# Patient Record
Sex: FEMALE | Race: WHITE | NOT HISPANIC OR LATINO | Employment: FULL TIME | URBAN - METROPOLITAN AREA
[De-identification: names, ages, dates, MRNs, and addresses within clinical notes are randomized per-mention and may not be internally consistent; named-entity substitution may affect disease eponyms.]

---

## 2017-01-12 ENCOUNTER — OFFICE VISIT (OUTPATIENT)
Dept: FAMILY MEDICINE | Facility: CLINIC | Age: 52
End: 2017-01-12
Payer: COMMERCIAL

## 2017-01-12 VITALS
HEART RATE: 76 BPM | HEIGHT: 67 IN | WEIGHT: 130.06 LBS | BODY MASS INDEX: 20.41 KG/M2 | DIASTOLIC BLOOD PRESSURE: 79 MMHG | TEMPERATURE: 98 F | SYSTOLIC BLOOD PRESSURE: 130 MMHG

## 2017-01-12 DIAGNOSIS — R51.9 CHRONIC NONINTRACTABLE HEADACHE, UNSPECIFIED HEADACHE TYPE: ICD-10-CM

## 2017-01-12 DIAGNOSIS — G89.29 CHRONIC NONINTRACTABLE HEADACHE, UNSPECIFIED HEADACHE TYPE: ICD-10-CM

## 2017-01-12 DIAGNOSIS — M48.9 CERVICAL SPINE DISEASE: Primary | ICD-10-CM

## 2017-01-12 PROCEDURE — 99999 PR PBB SHADOW E&M-EST. PATIENT-LVL III: CPT | Mod: PBBFAC,,, | Performed by: FAMILY MEDICINE

## 2017-01-12 PROCEDURE — 99213 OFFICE O/P EST LOW 20 MIN: CPT | Mod: S$GLB,,, | Performed by: FAMILY MEDICINE

## 2017-01-12 PROCEDURE — 1159F MED LIST DOCD IN RCRD: CPT | Mod: S$GLB,,, | Performed by: FAMILY MEDICINE

## 2017-01-12 RX ORDER — HYDROCODONE BITARTRATE AND ACETAMINOPHEN 10; 325 MG/1; MG/1
1 TABLET ORAL 4 TIMES DAILY
Qty: 120 TABLET | Refills: 0 | Status: SHIPPED | OUTPATIENT
Start: 2017-01-12 | End: 2017-04-11 | Stop reason: SDUPTHER

## 2017-01-12 RX ORDER — HYDROCODONE BITARTRATE AND ACETAMINOPHEN 10; 325 MG/1; MG/1
1 TABLET ORAL 4 TIMES DAILY
Qty: 120 TABLET | Refills: 0 | Status: CANCELLED | OUTPATIENT
Start: 2017-02-11

## 2017-01-12 RX ORDER — HYDROCODONE BITARTRATE AND ACETAMINOPHEN 10; 325 MG/1; MG/1
1 TABLET ORAL 4 TIMES DAILY
Qty: 120 TABLET | Refills: 0 | Status: CANCELLED | OUTPATIENT
Start: 2017-03-13

## 2017-01-12 RX ORDER — CELECOXIB 200 MG/1
200 CAPSULE ORAL 2 TIMES DAILY
Qty: 180 CAPSULE | Refills: 4 | Status: SHIPPED | OUTPATIENT
Start: 2017-01-12 | End: 2017-04-11

## 2017-01-12 NOTE — PROGRESS NOTES
The patient presents today to fu arthralgia more stable resp to HC and celebrex   Past Medical History:  Past Medical History   Diagnosis Date    Bulging disc      Past Surgical History   Procedure Laterality Date    Mandible fracture surgery       Review of patient's allergies indicates:   Allergen Reactions    No known drug allergies      Current Outpatient Prescriptions on File Prior to Visit   Medication Sig Dispense Refill    celecoxib (CELEBREX) 200 MG capsule Take 1 capsule (200 mg total) by mouth 2 (two) times daily. 180 capsule 4    COMBIPATCH 0.05-0.14 mg/24 hr APPLY AS DIRECTED 24 patch 3    hydrocodone-acetaminophen 10-325mg (NORCO)  mg Tab Take 1 tablet by mouth 4 (four) times daily. 120 tablet 0    omeprazole (PRILOSEC) 40 MG capsule TK 1 C PO QD  1     No current facility-administered medications on file prior to visit.      Social History     Social History    Marital status: Single     Spouse name: N/A    Number of children: N/A    Years of education: N/A     Occupational History     [employer]     Social History Main Topics    Smoking status: Never Smoker    Smokeless tobacco: Not on file    Alcohol use No    Drug use: No    Sexual activity: Yes     Other Topics Concern    Not on file     Social History Narrative     Family History   Problem Relation Age of Onset    Hypertension Mother     Diabetes Mother     Cancer Father     Colon cancer Neg Hx     Ovarian cancer Neg Hx     Breast cancer Paternal Aunt          ROS:GENERAL: No fever, chills, fatigability or weight loss.  SKIN: No rashes, itching or changes in color or texture of skin.  HEAD: No headaches or recent head trauma.EYES: Visual acuity fine. No photophobia, ocular pain or diplopia.EARS: Denies ear pain, discharge or vertigo.NOSE: No loss of smell, no epistaxis or postnasal drip.MOUTH & THROAT: No hoarseness or change in voice. No excessive gum bleeding.NODES: Denies swollen glands.  CHEST: Denies  ARROYO, cyanosis, wheezing, cough and sputum production.  CARDIOVASCULAR: Denies chest pain, PND, orthopnea or reduced exercise tolerance.  ABDOMEN: Appetite fine. No weight loss. Denies diarrhea, abdominal pain, hematemesis or blood in stool.  URINARY: No flank pain, dysuria or hematuria.  PERIPHERAL VASCULAR: No claudication or cyanosis.  MUSCULOSKELETAL: See above.  NEUROLOGIC: No history of seizures, paralysis, alteration of gait or coordination.  PE:   HEAD: Normocephalic, atraumatic.EYES: PERRL. EOMI.   EARS: TM's intact. Light reflex normal. No retraction or perforation.   NOSE: Mucosa pink. Airway clear.MOUTH & THROAT: No tonsillar enlargement. No pharyngeal erythema or exudate. No stridor.  NODES: No cervical, axillary or inguinal lymph node enlargement.  CHEST: Lungs clear to auscultation.  CARDIOVASCULAR: Normal S1, S2. No rubs, murmurs or gallops.  ABDOMEN: Bowel sounds normal. Not distended. Soft. No tenderness or masses.  MUSCULOSKELETAL: Mod gen degen changes NEUROLOGIC: Cranial Nerves: II-XII grossly intact.  Motor: 5/5 strength major flexors/extensors.  DTR's: Knees, Ankles 2+ and equal bilaterally; downgoing toes.  Sensory: Intact to light touch distally.  Gait & Posture: Normal gait and fine motion. No cerebellar signs.     Impression: Cervical and lumbar DDD  GERD  Plan:Rev normal lab eval to be done at [facility]   Rec diet and ex recs  Rev ok RF [id number] bid /HC for sparing use and   Continue H2 and or gaviscon-rev PPI risk low Mg renal fx

## 2017-01-30 ENCOUNTER — PATIENT MESSAGE (OUTPATIENT)
Dept: OBSTETRICS AND GYNECOLOGY | Facility: CLINIC | Age: 52
End: 2017-01-30

## 2017-03-28 ENCOUNTER — PATIENT OUTREACH (OUTPATIENT)
Dept: ADMINISTRATIVE | Facility: HOSPITAL | Age: 52
End: 2017-03-28

## 2017-04-11 ENCOUNTER — OFFICE VISIT (OUTPATIENT)
Dept: FAMILY MEDICINE | Facility: CLINIC | Age: 52
End: 2017-04-11
Payer: COMMERCIAL

## 2017-04-11 VITALS
HEART RATE: 64 BPM | HEIGHT: 67 IN | BODY MASS INDEX: 20.5 KG/M2 | SYSTOLIC BLOOD PRESSURE: 136 MMHG | WEIGHT: 130.63 LBS | DIASTOLIC BLOOD PRESSURE: 89 MMHG

## 2017-04-11 DIAGNOSIS — M48.9 CERVICAL SPINE DISEASE: Primary | ICD-10-CM

## 2017-04-11 DIAGNOSIS — G89.29 CHRONIC NONINTRACTABLE HEADACHE, UNSPECIFIED HEADACHE TYPE: ICD-10-CM

## 2017-04-11 DIAGNOSIS — R51.9 CHRONIC NONINTRACTABLE HEADACHE, UNSPECIFIED HEADACHE TYPE: ICD-10-CM

## 2017-04-11 PROCEDURE — 99999 PR PBB SHADOW E&M-EST. PATIENT-LVL II: CPT | Mod: PBBFAC,,, | Performed by: FAMILY MEDICINE

## 2017-04-11 PROCEDURE — 99213 OFFICE O/P EST LOW 20 MIN: CPT | Mod: S$GLB,,, | Performed by: FAMILY MEDICINE

## 2017-04-11 PROCEDURE — 1160F RVW MEDS BY RX/DR IN RCRD: CPT | Mod: S$GLB,,, | Performed by: FAMILY MEDICINE

## 2017-04-11 RX ORDER — HYDROCODONE BITARTRATE AND ACETAMINOPHEN 10; 325 MG/1; MG/1
1 TABLET ORAL 4 TIMES DAILY
Qty: 120 TABLET | Refills: 0 | Status: SHIPPED | OUTPATIENT
Start: 2017-04-11 | End: 2017-07-05 | Stop reason: SDUPTHER

## 2017-04-11 RX ORDER — DICLOFENAC POTASSIUM 50 MG/1
50 TABLET, FILM COATED ORAL 2 TIMES DAILY
Qty: 180 TABLET | Refills: 4 | Status: SHIPPED | OUTPATIENT
Start: 2017-04-11 | End: 2018-04-11 | Stop reason: SDUPTHER

## 2017-04-11 NOTE — PROGRESS NOTES
The patient presents today to fu arthralgia more stable resp to HC and celebrex   Past Medical History:  Past Medical History:   Diagnosis Date    Bulging disc      Past Surgical History:   Procedure Laterality Date    MANDIBLE FRACTURE SURGERY       Review of patient's allergies indicates:   Allergen Reactions    No known drug allergies      Current Outpatient Prescriptions on File Prior to Visit   Medication Sig Dispense Refill    celecoxib (CELEBREX) 200 MG capsule Take 1 capsule (200 mg total) by mouth 2 (two) times daily. 180 capsule 4    COMBIPATCH 0.05-0.14 mg/24 hr APPLY AS DIRECTED 24 patch 3    hydrocodone-acetaminophen 10-325mg (NORCO)  mg Tab Take 1 tablet by mouth 4 (four) times daily. 120 tablet 0    omeprazole (PRILOSEC) 40 MG capsule TK 1 C PO QD  1     No current facility-administered medications on file prior to visit.      Social History     Social History    Marital status: Single     Spouse name: N/A    Number of children: N/A    Years of education: N/A     Occupational History     [employer]     Social History Main Topics    Smoking status: Never Smoker    Smokeless tobacco: Not on file    Alcohol use No    Drug use: No    Sexual activity: Yes     Other Topics Concern    Not on file     Social History Narrative    No narrative on file     Family History   Problem Relation Age of Onset    Hypertension Mother     Diabetes Mother     Cancer Father     Breast cancer Paternal Aunt     Colon cancer Neg Hx     Ovarian cancer Neg Hx          ROS:GENERAL: No fever, chills, fatigability or weight loss.  SKIN: No rashes, itching or changes in color or texture of skin.  HEAD: No headaches or recent head trauma.EYES: Visual acuity fine. No photophobia, ocular pain or diplopia.EARS: Denies ear pain, discharge or vertigo.NOSE: No loss of smell, no epistaxis or postnasal drip.MOUTH & THROAT: No hoarseness or change in voice. No excessive gum bleeding.NODES: Denies swollen  glands.  CHEST: Denies ARROYO, cyanosis, wheezing, cough and sputum production.  CARDIOVASCULAR: Denies chest pain, PND, orthopnea or reduced exercise tolerance.  ABDOMEN: Appetite fine. No weight loss. Denies diarrhea, abdominal pain, hematemesis or blood in stool.  URINARY: No flank pain, dysuria or hematuria.  PERIPHERAL VASCULAR: No claudication or cyanosis.  MUSCULOSKELETAL: See above.  NEUROLOGIC: No history of seizures, paralysis, alteration of gait or coordination.  PE:   HEAD: Normocephalic, atraumatic.EYES: PERRL. EOMI.   EARS: TM's intact. Light reflex normal. No retraction or perforation.   NOSE: Mucosa pink. Airway clear.MOUTH & THROAT: No tonsillar enlargement. No pharyngeal erythema or exudate. No stridor.  NODES: No cervical, axillary or inguinal lymph node enlargement.  CHEST: Lungs clear to auscultation.  CARDIOVASCULAR: Normal S1, S2. No rubs, murmurs or gallops.  ABDOMEN: Bowel sounds normal. Not distended. Soft. No tenderness or masses.  MUSCULOSKELETAL: Mod gen degen changes NEUROLOGIC: Cranial Nerves: II-XII grossly intact.  Motor: 5/5 strength major flexors/extensors.  DTR's: Knees, Ankles 2+ and equal bilaterally; downgoing toes.  Sensory: Intact to light touch distally.  Gait & Posture: Normal gait and fine motion. No cerebellar signs.     Impression: Cervical and lumbar DDD  GERD  [facility]   [facility] diet and ex recs  Rev ok Ch [id number] to diclo K  bid /HC for sparing use and   Continue H2 and or gaviscon-rev PPI risk low Mg renal fx

## 2017-06-21 ENCOUNTER — PATIENT OUTREACH (OUTPATIENT)
Dept: ADMINISTRATIVE | Facility: HOSPITAL | Age: 52
End: 2017-06-21

## 2017-06-21 NOTE — PROGRESS NOTES
Spoke with pt concerning her overdue health maintenance.  Pt stated she was going to schedule an appt with DIOGENES Sawyer and have her Mammogram done next month.  She also understand that she need her Hep C Screening done and she will schedule when she has her upcoming blood work that is due soon.  Pre visit chart audit letter sent.

## 2017-06-29 ENCOUNTER — PATIENT OUTREACH (OUTPATIENT)
Dept: ADMINISTRATIVE | Facility: HOSPITAL | Age: 52
End: 2017-06-29

## 2017-06-29 NOTE — LETTER
June 29, 2017    Wendie Leandro  [address]  [place] [address]           [facility]  [address]  [facility] [address]  Phone: [phone] Dear Mrs. Reeves:    [facility] is committed to your overall health.  To help you get the most out of each of your visits, we will review your information to make sure you are up to date on all of your recommended tests and/or procedures.      Dhaval Tyler MD has found that you may be due for   Health Maintenance Due   Topic    Hepatitis C Screening         If you have had any of the above done at another facility, please bring the records or information with you so that your record at [facility] will be complete.    If you are currently taking medication, please bring it with you to your appointment for review.    We will be happy to assist you with scheduling any necessary appointments or you may contact the [facility] appointment desk at [phone] to schedule at your convenience.     Thank you for choosing [facility] for your healthcare needs,            If you have any questions or concerns, please don't hesitate to call.    Sincerely,        Smitha Angel LPN

## 2017-07-03 ENCOUNTER — TELEPHONE (OUTPATIENT)
Dept: OBSTETRICS AND GYNECOLOGY | Facility: CLINIC | Age: 52
End: 2017-07-03

## 2017-07-03 NOTE — TELEPHONE ENCOUNTER
----- Message from Francisco Thomas sent at 7/3/2017  2:29 PM CDT -----  Contact: Pt  x_ 1st Request  _ 2nd Request  _ 3rd Request    Who: Pt    Why: Pt is needing to schedule a appointment.     What Number to Call Back: [phone]    When to Expect a call back: (Before the end of the day)  -- if call after 3:00 call back will be tomorrow.

## 2017-07-05 ENCOUNTER — OFFICE VISIT (OUTPATIENT)
Dept: FAMILY MEDICINE | Facility: CLINIC | Age: 52
End: 2017-07-05
Payer: COMMERCIAL

## 2017-07-05 VITALS
DIASTOLIC BLOOD PRESSURE: 89 MMHG | SYSTOLIC BLOOD PRESSURE: 139 MMHG | WEIGHT: 129.19 LBS | BODY MASS INDEX: 20.28 KG/M2 | HEIGHT: 67 IN | HEART RATE: 64 BPM

## 2017-07-05 DIAGNOSIS — M48.9 CERVICAL SPINE DISEASE: Primary | ICD-10-CM

## 2017-07-05 PROCEDURE — 99999 PR PBB SHADOW E&M-EST. PATIENT-LVL II: CPT | Mod: PBBFAC,,, | Performed by: FAMILY MEDICINE

## 2017-07-05 PROCEDURE — 99213 OFFICE O/P EST LOW 20 MIN: CPT | Mod: S$GLB,,, | Performed by: FAMILY MEDICINE

## 2017-07-05 RX ORDER — HYDROCODONE BITARTRATE AND ACETAMINOPHEN 10; 325 MG/1; MG/1
1 TABLET ORAL 4 TIMES DAILY
Qty: 120 TABLET | Refills: 0 | Status: SHIPPED | OUTPATIENT
Start: 2017-07-05 | End: 2017-10-11 | Stop reason: SDUPTHER

## 2017-07-05 RX ORDER — GABAPENTIN 300 MG/1
300 CAPSULE ORAL NIGHTLY
Qty: 30 CAPSULE | Refills: 11 | Status: SHIPPED | OUTPATIENT
Start: 2017-07-05 | End: 2017-10-11

## 2017-07-05 NOTE — PROGRESS NOTES
The patient presents today to fu arthralgia more stable resp to HC and celebrex   Past Medical History:  Past Medical History:   Diagnosis Date    Bulging disc      Past Surgical History:   Procedure Laterality Date    MANDIBLE FRACTURE SURGERY       Review of patient's allergies indicates:   Allergen Reactions    No known drug allergies      Current Outpatient Prescriptions on File Prior to Visit   Medication Sig Dispense Refill    COMBIPATCH 0.05-0.14 mg/24 hr APPLY AS DIRECTED 24 patch 3    diclofenac (CATAFLAM) 50 MG tablet Take 1 tablet (50 mg total) by mouth 2 (two) times daily. 180 tablet 4    hydrocodone-acetaminophen 10-325mg (NORCO)  mg Tab Take 1 tablet by mouth 4 (four) times daily. 120 tablet 0    omeprazole (PRILOSEC) 40 MG capsule TK 1 C PO QD  1     No current facility-administered medications on file prior to visit.      Social History     Social History    Marital status: Single     Spouse name: N/A    Number of children: N/A    Years of education: N/A     Occupational History     [employer]     Social History Main Topics    Smoking status: Never Smoker    Smokeless tobacco: Not on file    Alcohol use No    Drug use: No    Sexual activity: Yes     Other Topics Concern    Not on file     Social History Narrative    No narrative on file     Family History   Problem Relation Age of Onset    Hypertension Mother     Diabetes Mother     Cancer Father     Breast cancer Paternal Aunt     Colon cancer Neg Hx     Ovarian cancer Neg Hx          ROS:GENERAL: No fever, chills, fatigability or weight loss.  SKIN: No rashes, itching or changes in color or texture of skin.  HEAD: No headaches or recent head trauma.EYES: Visual acuity fine. No photophobia, ocular pain or diplopia.EARS: Denies ear pain, discharge or vertigo.NOSE: No loss of smell, no epistaxis or postnasal drip.MOUTH & THROAT: No hoarseness or change in voice. No excessive gum bleeding.NODES: Denies swollen  glands.  CHEST: Denies ARROYO, cyanosis, wheezing, cough and sputum production.  CARDIOVASCULAR: Denies chest pain, PND, orthopnea or reduced exercise tolerance.  ABDOMEN: Appetite fine. No weight loss. Denies diarrhea, abdominal pain, hematemesis or blood in stool.  URINARY: No flank pain, dysuria or hematuria.  PERIPHERAL VASCULAR: No claudication or cyanosis.  MUSCULOSKELETAL: See above.  NEUROLOGIC: No history of seizures, paralysis, alteration of gait or coordination.  PE:   HEAD: Normocephalic, atraumatic.EYES: PERRL. EOMI.   EARS: TM's intact. Light reflex normal. No retraction or perforation.   NOSE: Mucosa pink. Airway clear.MOUTH & THROAT: No tonsillar enlargement. No pharyngeal erythema or exudate. No stridor.  NODES: No cervical, axillary or inguinal lymph node enlargement.  CHEST: Lungs clear to auscultation.  CARDIOVASCULAR: Normal S1, S2. No rubs, murmurs or gallops.  ABDOMEN: Bowel sounds normal. Not distended. Soft. No tenderness or masses.  MUSCULOSKELETAL: Mod gen degen changes NEUROLOGIC: Cranial Nerves: II-XII grossly intact.  Motor: 5/5 strength major flexors/extensors.  DTR's: Knees, Ankles 2+ and equal bilaterally; downgoing toes.  Sensory: Intact to light touch distally.  Gait & Posture: Normal gait and fine motion. No cerebellar signs.     Impression: Cervical and lumbar DDD  GERD  Plan:[facility]   Rec diet and ex recs  Rev ok HC for sparing use and   Continue H2 and or gaviscon-rev PPI risk low Mg renal fx

## 2017-07-18 ENCOUNTER — PATIENT MESSAGE (OUTPATIENT)
Dept: FAMILY MEDICINE | Facility: CLINIC | Age: 52
End: 2017-07-18

## 2017-07-20 NOTE — TELEPHONE ENCOUNTER
We are trying to get all people off of the ambien due to addictive nature and issues with this.     I would recommend changing to something else in another class like amitriptyline.  We cannot use the 10 mg dose of ambien any more and obviously Dr. Tyler did not want us to use this class anyway.  Let the patient know I would like to try amitryptiline 10-20 mg a night and will send that in.  Let me know.

## 2017-07-21 NOTE — TELEPHONE ENCOUNTER
Patient would like to wait for a response from Dr. Tyler. Patient is aware Dr. Tyler is out of town until 7/26

## 2017-07-27 RX ORDER — ZOLPIDEM TARTRATE 10 MG/1
10 TABLET ORAL NIGHTLY PRN
Qty: 30 TABLET | Refills: 0 | Status: SHIPPED | OUTPATIENT
Start: 2017-07-27 | End: 2017-07-27 | Stop reason: SDUPTHER

## 2017-07-27 RX ORDER — ZOLPIDEM TARTRATE 10 MG/1
10 TABLET ORAL NIGHTLY PRN
Qty: 30 TABLET | Refills: 0 | Status: SHIPPED | OUTPATIENT
Start: 2017-07-27 | End: 2018-04-11 | Stop reason: SDUPTHER

## 2017-08-09 ENCOUNTER — OFFICE VISIT (OUTPATIENT)
Dept: OBSTETRICS AND GYNECOLOGY | Facility: CLINIC | Age: 52
End: 2017-08-09
Attending: OBSTETRICS & GYNECOLOGY
Payer: COMMERCIAL

## 2017-08-09 VITALS — WEIGHT: 131.19 LBS | HEIGHT: 67 IN | BODY MASS INDEX: 20.59 KG/M2

## 2017-08-09 DIAGNOSIS — Z78.0 MENOPAUSE: ICD-10-CM

## 2017-08-09 DIAGNOSIS — Z12.31 VISIT FOR SCREENING MAMMOGRAM: Primary | ICD-10-CM

## 2017-08-09 PROCEDURE — 3008F BODY MASS INDEX DOCD: CPT | Mod: S$GLB,,, | Performed by: OBSTETRICS & GYNECOLOGY

## 2017-08-09 PROCEDURE — 99999 PR PBB SHADOW E&M-EST. PATIENT-LVL II: CPT | Mod: PBBFAC,,, | Performed by: OBSTETRICS & GYNECOLOGY

## 2017-08-09 PROCEDURE — 96372 THER/PROPH/DIAG INJ SC/IM: CPT | Mod: S$GLB,,, | Performed by: OBSTETRICS & GYNECOLOGY

## 2017-08-09 PROCEDURE — 99214 OFFICE O/P EST MOD 30 MIN: CPT | Mod: 25,S$GLB,, | Performed by: OBSTETRICS & GYNECOLOGY

## 2017-08-09 RX ORDER — ESTRADIOL 0.07 MG/D
1 FILM, EXTENDED RELEASE TRANSDERMAL
Qty: 4 PATCH | Refills: 11 | Status: SHIPPED | OUTPATIENT
Start: 2017-08-09 | End: 2018-01-10

## 2017-08-09 RX ORDER — PROGESTERONE 100 MG/1
100 CAPSULE ORAL NIGHTLY
Qty: 30 CAPSULE | Refills: 11 | Status: SHIPPED | OUTPATIENT
Start: 2017-08-09 | End: 2017-10-11

## 2017-08-09 RX ORDER — TESTOSTERONE CYPIONATE 200 MG/ML
50 INJECTION, SOLUTION INTRAMUSCULAR ONCE
Status: COMPLETED | OUTPATIENT
Start: 2017-08-09 | End: 2017-08-09

## 2017-08-09 RX ADMIN — TESTOSTERONE CYPIONATE 50 MG: 200 INJECTION, SOLUTION INTRAMUSCULAR at 09:08

## 2017-08-09 NOTE — PROGRESS NOTES
Subjective:       Patient ID: Wendie Reeves is a 51 y.o. female.    Chief Complaint:  Follow-up (for hormone levels)      History of Present Illness  HPI  This 51 yr old female has been doing well on combipatch but had lab done and estradiol only 50.  She also complains of no libido, weight issues and just wants to feel better.  Her internist did full workup and testosterone at lower end of normal as well.  We had a very long discussion on risks and benefits and spent over 25min.    GYN & OB History  Patient's last menstrual period was 2014.   Date of Last Pap: 2015    OB History    Para Term  AB Living   0             SAB TAB Ectopic Multiple Live Births                         Review of Systems  Review of Systems   Constitutional: Positive for fatigue. Negative for chills and fever.   Respiratory: Negative for shortness of breath.    Cardiovascular: Negative for chest pain.   Gastrointestinal: Negative for abdominal pain, nausea and vomiting.   Genitourinary: Negative for difficulty urinating, dyspareunia, genital sores, menstrual problem, pelvic pain, vaginal bleeding, vaginal discharge and vaginal pain.   Skin: Negative for wound.   Hematological: Negative for adenopathy.           Objective:    Physical Exam       Assessment:        1. Visit for screening mammogram    2. Menopause               Plan:      Will start Im testosterone 50 mg today and change to 0.075 vivelle and prometrium 100  Follow up in one month for discussion and changes if necessary

## 2017-08-17 ENCOUNTER — TELEPHONE (OUTPATIENT)
Dept: FAMILY MEDICINE | Facility: CLINIC | Age: 52
End: 2017-08-17

## 2017-08-17 NOTE — TELEPHONE ENCOUNTER
----- Message from May Millard sent at 8/17/2017 10:41 AM CDT -----  Contact: [facility]  [facility] returned call to Liliana. She can be contacted at [phone].    Thanks,  May

## 2017-09-26 RX ORDER — ESTRADIOL/NORETHINDRONE ACETATE TRANSDERMAL SYSTEM .05; .14 MG/D; MG/D
PATCH, EXTENDED RELEASE TRANSDERMAL
Qty: 24 PATCH | Refills: 0 | Status: SHIPPED | OUTPATIENT
Start: 2017-09-26 | End: 2017-12-22 | Stop reason: SDUPTHER

## 2017-09-27 ENCOUNTER — PATIENT OUTREACH (OUTPATIENT)
Dept: ADMINISTRATIVE | Facility: HOSPITAL | Age: 52
End: 2017-09-27

## 2017-10-11 ENCOUNTER — OFFICE VISIT (OUTPATIENT)
Dept: FAMILY MEDICINE | Facility: CLINIC | Age: 52
End: 2017-10-11
Payer: COMMERCIAL

## 2017-10-11 VITALS
SYSTOLIC BLOOD PRESSURE: 137 MMHG | HEART RATE: 59 BPM | WEIGHT: 130.63 LBS | HEIGHT: 67 IN | DIASTOLIC BLOOD PRESSURE: 94 MMHG | BODY MASS INDEX: 20.5 KG/M2

## 2017-10-11 DIAGNOSIS — G89.29 CHRONIC NONINTRACTABLE HEADACHE, UNSPECIFIED HEADACHE TYPE: ICD-10-CM

## 2017-10-11 DIAGNOSIS — R51.9 CHRONIC NONINTRACTABLE HEADACHE, UNSPECIFIED HEADACHE TYPE: ICD-10-CM

## 2017-10-11 DIAGNOSIS — M48.9 CERVICAL SPINE DISEASE: Primary | ICD-10-CM

## 2017-10-11 PROCEDURE — 99999 PR PBB SHADOW E&M-EST. PATIENT-LVL II: CPT | Mod: PBBFAC,,, | Performed by: FAMILY MEDICINE

## 2017-10-11 PROCEDURE — 99214 OFFICE O/P EST MOD 30 MIN: CPT | Mod: S$GLB,,, | Performed by: FAMILY MEDICINE

## 2017-10-11 RX ORDER — HYDROCODONE BITARTRATE AND ACETAMINOPHEN 10; 325 MG/1; MG/1
1 TABLET ORAL 4 TIMES DAILY
Qty: 120 TABLET | Refills: 0 | Status: SHIPPED | OUTPATIENT
Start: 2017-10-11 | End: 2018-01-10 | Stop reason: SDUPTHER

## 2017-10-11 RX ORDER — NORTRIPTYLINE HYDROCHLORIDE 10 MG/1
10 CAPSULE ORAL NIGHTLY
Qty: 30 CAPSULE | Refills: 11 | Status: SHIPPED | OUTPATIENT
Start: 2017-10-11 | End: 2018-01-10

## 2017-10-11 NOTE — PROGRESS NOTES
The patient presents today to fu arthralgia more stable resp to HC and celebrex. HA are more bothersome and often    Past Medical History:  Past Medical History:   Diagnosis Date    Bulging disc      Past Surgical History:   Procedure Laterality Date    MANDIBLE FRACTURE SURGERY       Review of patient's allergies indicates:   Allergen Reactions    No known drug allergies      Current Outpatient Prescriptions on File Prior to Visit   Medication Sig Dispense Refill    COMBIPATCH 0.05-0.14 mg/24 hr APPLY AS DIRECTED 24 patch 0    diclofenac (CATAFLAM) 50 MG tablet Take 1 tablet (50 mg total) by mouth 2 (two) times daily. 180 tablet 4    estradiol (VIVELLE) 0.075 mg/24 hr Place 1 patch onto the skin every 7 days. 4 patch 11    hydrocodone-acetaminophen 10-325mg (NORCO)  mg Tab Take 1 tablet by mouth 4 (four) times daily. 120 tablet 0    omeprazole (PRILOSEC) 40 MG capsule TK 1 C PO QD  1    zolpidem (AMBIEN) 10 mg Tab Take 1 tablet (10 mg total) by mouth nightly as needed. 30 tablet 0    [DISCONTINUED] gabapentin (NEURONTIN) 300 MG capsule Take 1 capsule (300 mg total) by mouth every evening. 30 capsule 11    [DISCONTINUED] progesterone (PROMETRIUM) 100 MG capsule Take 1 capsule (100 mg total) by mouth nightly. 30 capsule 11     No current facility-administered medications on file prior to visit.      Social History     Social History    Marital status: Single     Spouse name: N/A    Number of children: N/A    Years of education: N/A     Occupational History     [employer]     Social History Main Topics    Smoking status: Never Smoker    Smokeless tobacco: Never Used    Alcohol use No    Drug use: No    Sexual activity: Yes     Birth control/ protection: Post-menopausal     Other Topics Concern    Not on file     Social History Narrative    No narrative on file     Family History   Problem Relation Age of Onset    Hypertension Mother     Diabetes Mother     Cancer Father      Breast cancer Paternal Aunt     Colon cancer Neg Hx     Ovarian cancer Neg Hx          ROS:GENERAL: No fever, chills, fatigability or weight loss.  SKIN: No rashes, itching or changes in color or texture of skin.  HEAD: No headaches or recent head trauma.EYES: Visual acuity fine. No photophobia, ocular pain or diplopia.EARS: Denies ear pain, discharge or vertigo.NOSE: No loss of smell, no epistaxis or postnasal drip.MOUTH & THROAT: No hoarseness or change in voice. No excessive gum bleeding.NODES: Denies swollen glands.  CHEST: Denies ARROYO, cyanosis, wheezing, cough and sputum production.  CARDIOVASCULAR: Denies chest pain, PND, orthopnea or reduced exercise tolerance.  ABDOMEN: Appetite fine. No weight loss. Denies diarrhea, abdominal pain, hematemesis or blood in stool.  URINARY: No flank pain, dysuria or hematuria.  PERIPHERAL VASCULAR: No claudication or cyanosis.  MUSCULOSKELETAL: See above.  NEUROLOGIC: No history of seizures, paralysis, alteration of gait or coordination.  PE:   HEAD: Normocephalic, atraumatic.EYES: PERRL. EOMI.   EARS: TM's intact. Light reflex normal. No retraction or perforation.   NOSE: Mucosa pink. Airway clear.MOUTH & THROAT: No tonsillar enlargement. No pharyngeal erythema or exudate. No stridor.  NODES: No cervical, axillary or inguinal lymph node enlargement.  CHEST: Lungs clear to auscultation.  CARDIOVASCULAR: Normal S1, S2. No rubs, murmurs or gallops.  ABDOMEN: Bowel sounds normal. Not distended. Soft. No tenderness or masses.  MUSCULOSKELETAL: Mod gen degen changes NEUROLOGIC: Cranial Nerves: II-XII grossly intact.  Motor: 5/5 strength major flexors/extensors.  DTR's: Knees, Ankles 2+ and equal bilaterally; downgoing toes.  Sensory: Intact to light touch distally.  Gait & Posture: Normal gait and fine motion. No cerebellar signs.     Impression: Cervical and lumbar DDD  CD GONZALEZ  GERD  Plan:Rec ESR to be done at [facility]   Rec diet and ex recs  Rev ok HC for sparing use and    Continue H2 and or gaviscon-rev PPI risk low Mg renal fx

## 2017-12-27 ENCOUNTER — PATIENT OUTREACH (OUTPATIENT)
Dept: ADMINISTRATIVE | Facility: HOSPITAL | Age: 52
End: 2017-12-27

## 2017-12-27 RX ORDER — ESTRADIOL/NORETHINDRONE ACETATE TRANSDERMAL SYSTEM .05; .14 MG/D; MG/D
PATCH, EXTENDED RELEASE TRANSDERMAL
Qty: 24 PATCH | Refills: 0 | Status: SHIPPED | OUTPATIENT
Start: 2017-12-27 | End: 2018-07-25 | Stop reason: SDUPTHER

## 2017-12-27 NOTE — PROGRESS NOTES
Attempted to contact pt concerning overdue mammogram. No answer. Left message for pt to return my call.

## 2017-12-27 NOTE — PROGRESS NOTES
Pt return my call and stated due to her insurance she will have her mammogram perform at [facility] where she works.  She stated she will bring a copy of mammogram report to update her chart.

## 2018-01-10 ENCOUNTER — OFFICE VISIT (OUTPATIENT)
Dept: FAMILY MEDICINE | Facility: CLINIC | Age: 53
End: 2018-01-10
Payer: COMMERCIAL

## 2018-01-10 ENCOUNTER — HOSPITAL ENCOUNTER (OUTPATIENT)
Dept: RADIOLOGY | Facility: HOSPITAL | Age: 53
Discharge: HOME | End: 2018-01-10
Attending: FAMILY MEDICINE
Payer: COMMERCIAL

## 2018-01-10 VITALS
HEIGHT: 67 IN | BODY MASS INDEX: 20.76 KG/M2 | WEIGHT: 132.25 LBS | DIASTOLIC BLOOD PRESSURE: 82 MMHG | SYSTOLIC BLOOD PRESSURE: 134 MMHG

## 2018-01-10 VITALS — HEIGHT: 67 IN | BODY MASS INDEX: 20.76 KG/M2 | WEIGHT: 132.25 LBS

## 2018-01-10 DIAGNOSIS — Z12.31 SCREENING MAMMOGRAM, ENCOUNTER FOR: ICD-10-CM

## 2018-01-10 DIAGNOSIS — G89.29 CHRONIC NONINTRACTABLE HEADACHE, UNSPECIFIED HEADACHE TYPE: ICD-10-CM

## 2018-01-10 DIAGNOSIS — R51.9 CHRONIC NONINTRACTABLE HEADACHE, UNSPECIFIED HEADACHE TYPE: ICD-10-CM

## 2018-01-10 DIAGNOSIS — M48.9 CERVICAL SPINE DISEASE: Primary | ICD-10-CM

## 2018-01-10 PROCEDURE — 99999 PR PBB SHADOW E&M-EST. PATIENT-LVL II: CPT | Mod: PBBFAC,,, | Performed by: FAMILY MEDICINE

## 2018-01-10 PROCEDURE — 77063 BREAST TOMOSYNTHESIS BI: CPT | Mod: 26,,, | Performed by: RADIOLOGY

## 2018-01-10 PROCEDURE — 77067 SCR MAMMO BI INCL CAD: CPT | Mod: TC,PO

## 2018-01-10 PROCEDURE — 99213 OFFICE O/P EST LOW 20 MIN: CPT | Mod: S$GLB,,, | Performed by: FAMILY MEDICINE

## 2018-01-10 PROCEDURE — 77067 SCR MAMMO BI INCL CAD: CPT | Mod: 26,,, | Performed by: RADIOLOGY

## 2018-01-10 RX ORDER — OMEPRAZOLE 40 MG/1
CAPSULE, DELAYED RELEASE ORAL
Qty: 90 CAPSULE | Refills: 3 | Status: SHIPPED | OUTPATIENT
Start: 2018-01-10 | End: 2018-02-26

## 2018-01-10 RX ORDER — HYDROCODONE BITARTRATE AND ACETAMINOPHEN 10; 325 MG/1; MG/1
1 TABLET ORAL 4 TIMES DAILY
Qty: 120 TABLET | Refills: 0 | Status: SHIPPED | OUTPATIENT
Start: 2018-01-10 | End: 2018-04-11 | Stop reason: SDUPTHER

## 2018-01-10 NOTE — PROGRESS NOTES
The patient presents today to fu arthralgia more stable resp to HC and celebrex. HA are more bothersome and often    Past Medical History:  Past Medical History:   Diagnosis Date    Bulging disc      Past Surgical History:   Procedure Laterality Date    MANDIBLE FRACTURE SURGERY       Review of patient's allergies indicates:   Allergen Reactions    No known drug allergies      Current Outpatient Prescriptions on File Prior to Visit   Medication Sig Dispense Refill    COMBIPATCH 0.05-0.14 mg/24 hr APPLY TWICE A WEEK AS DIRECTED 24 patch 0    diclofenac (CATAFLAM) 50 MG tablet Take 1 tablet (50 mg total) by mouth 2 (two) times daily. 180 tablet 4    hydrocodone-acetaminophen 10-325mg (NORCO)  mg Tab Take 1 tablet by mouth 4 (four) times daily. 120 tablet 0    omeprazole (PRILOSEC) 40 MG capsule TK 1 C PO QD  1    zolpidem (AMBIEN) 10 mg Tab Take 1 tablet (10 mg total) by mouth nightly as needed. 30 tablet 0    [DISCONTINUED] estradiol (VIVELLE) 0.075 mg/24 hr Place 1 patch onto the skin every 7 days. 4 patch 11    [DISCONTINUED] nortriptyline (PAMELOR) 10 MG capsule Take 1 capsule (10 mg total) by mouth every evening. 30 capsule 11     No current facility-administered medications on file prior to visit.      Social History     Social History    Marital status: Single     Spouse name: N/A    Number of children: N/A    Years of education: N/A     Occupational History     [employer]     Social History Main Topics    Smoking status: Never Smoker    Smokeless tobacco: Never Used    Alcohol use No    Drug use: No    Sexual activity: Yes     Birth control/ protection: Post-menopausal     Other Topics Concern    Not on file     Social History Narrative    No narrative on file     Family History   Problem Relation Age of Onset    Hypertension Mother     Diabetes Mother     Cancer Father     Breast cancer Paternal Aunt     Colon cancer Neg Hx     Ovarian cancer Neg Hx           ROS:GENERAL: No fever, chills, fatigability or weight loss.  SKIN: No rashes, itching or changes in color or texture of skin.  HEAD: No headaches or recent head trauma.EYES: Visual acuity fine. No photophobia, ocular pain or diplopia.EARS: Denies ear pain, discharge or vertigo.NOSE: No loss of smell, no epistaxis or postnasal drip.MOUTH & THROAT: No hoarseness or change in voice. No excessive gum bleeding.NODES: Denies swollen glands.  CHEST: Denies ARROYO, cyanosis, wheezing, cough and sputum production.  CARDIOVASCULAR: Denies chest pain, PND, orthopnea or reduced exercise tolerance.  ABDOMEN: Appetite fine. No weight loss. Denies diarrhea, abdominal pain, hematemesis or blood in stool.  URINARY: No flank pain, dysuria or hematuria.  PERIPHERAL VASCULAR: No claudication or cyanosis.  MUSCULOSKELETAL: See above.  NEUROLOGIC: No history of seizures, paralysis, alteration of gait or coordination.  PE:   HEAD: Normocephalic, atraumatic.EYES: PERRL. EOMI.   EARS: TM's intact. Light reflex normal. No retraction or perforation.   NOSE: Mucosa pink. Airway clear.MOUTH & THROAT: No tonsillar enlargement. No pharyngeal erythema or exudate. No stridor.  NODES: No cervical, axillary or inguinal lymph node enlargement.  CHEST: Lungs clear to auscultation.  CARDIOVASCULAR: Normal S1, S2. No rubs, murmurs or gallops.  ABDOMEN: Bowel sounds normal. Not distended. Soft. No tenderness or masses.  MUSCULOSKELETAL: Mod gen degen changes NEUROLOGIC: Cranial Nerves: II-XII grossly intact.  Motor: 5/5 strength major flexors/extensors.  DTR's: Knees, Ankles 2+ and equal bilaterally; downgoing toes.  Sensory: Intact to light touch distally.  Gait & Posture: Normal gait and fine motion. No cerebellar signs.     Impression: Cervical and lumbar DDD  CD GONZALEZ  GERD  Plan:   Rec diet and ex recs  Rev ok HC for sparing use and   Continue H2 and or gaviscon-rev PPI risk low Mg renal fx

## 2018-02-26 ENCOUNTER — OFFICE VISIT (OUTPATIENT)
Dept: FAMILY MEDICINE | Facility: CLINIC | Age: 53
End: 2018-02-26
Payer: COMMERCIAL

## 2018-02-26 ENCOUNTER — HOSPITAL ENCOUNTER (OUTPATIENT)
Dept: RADIOLOGY | Facility: HOSPITAL | Age: 53
Discharge: HOME | End: 2018-02-26
Attending: NURSE PRACTITIONER
Payer: COMMERCIAL

## 2018-02-26 VITALS
HEART RATE: 88 BPM | TEMPERATURE: 98 F | RESPIRATION RATE: 16 BRPM | BODY MASS INDEX: 20.72 KG/M2 | HEIGHT: 67 IN | SYSTOLIC BLOOD PRESSURE: 127 MMHG | OXYGEN SATURATION: 100 % | WEIGHT: 132 LBS | DIASTOLIC BLOOD PRESSURE: 87 MMHG

## 2018-02-26 DIAGNOSIS — J18.9 PNEUMONIA OF RIGHT LOWER LOBE DUE TO INFECTIOUS ORGANISM: Primary | ICD-10-CM

## 2018-02-26 DIAGNOSIS — R11.0 NAUSEA: ICD-10-CM

## 2018-02-26 DIAGNOSIS — R68.89 INFLUENZA-LIKE SYMPTOMS: ICD-10-CM

## 2018-02-26 DIAGNOSIS — R05.9 COUGH: ICD-10-CM

## 2018-02-26 DIAGNOSIS — B37.31 VAGINAL YEAST INFECTION: ICD-10-CM

## 2018-02-26 DIAGNOSIS — Z87.01 HISTORY OF PNEUMONIA: ICD-10-CM

## 2018-02-26 PROCEDURE — 71046 X-RAY EXAM CHEST 2 VIEWS: CPT | Mod: 26,,, | Performed by: RADIOLOGY

## 2018-02-26 PROCEDURE — 99213 OFFICE O/P EST LOW 20 MIN: CPT | Mod: S$GLB,,, | Performed by: NURSE PRACTITIONER

## 2018-02-26 PROCEDURE — 3008F BODY MASS INDEX DOCD: CPT | Mod: S$GLB,,, | Performed by: NURSE PRACTITIONER

## 2018-02-26 PROCEDURE — 71046 X-RAY EXAM CHEST 2 VIEWS: CPT | Mod: TC,PO

## 2018-02-26 PROCEDURE — 99999 PR PBB SHADOW E&M-EST. PATIENT-LVL IV: CPT | Mod: PBBFAC,,, | Performed by: NURSE PRACTITIONER

## 2018-02-26 RX ORDER — GUAIFENESIN 1200 MG/1
1200 TABLET, EXTENDED RELEASE ORAL 2 TIMES DAILY
COMMUNITY
Start: 2018-02-26 | End: 2018-03-08

## 2018-02-26 RX ORDER — FLUCONAZOLE 150 MG/1
150 TABLET ORAL ONCE
Qty: 1 TABLET | Refills: 0 | Status: SHIPPED | OUTPATIENT
Start: 2018-02-26 | End: 2018-02-26

## 2018-02-26 RX ORDER — CEFDINIR 300 MG/1
300 CAPSULE ORAL 2 TIMES DAILY
Qty: 20 CAPSULE | Refills: 0 | Status: SHIPPED | OUTPATIENT
Start: 2018-02-26 | End: 2018-03-08

## 2018-02-26 RX ORDER — PREDNISONE 20 MG/1
20 TABLET ORAL 2 TIMES DAILY
Qty: 6 TABLET | Refills: 0 | Status: SHIPPED | OUTPATIENT
Start: 2018-02-26 | End: 2018-03-02

## 2018-02-26 RX ORDER — HYDROXYZINE PAMOATE 25 MG/1
25 CAPSULE ORAL EVERY 6 HOURS PRN
Qty: 28 CAPSULE | Refills: 0 | Status: SHIPPED | OUTPATIENT
Start: 2018-02-26 | End: 2018-03-27

## 2018-02-26 NOTE — PATIENT INSTRUCTIONS
Tylenol/Motrin OTC as needed for fever/pain  Hydrate well  Rest  Report to ER if symptoms worsen      Pneumonia (Adult)  Pneumonia is an infection deep within the lungs. It is in the small air sacs (alveoli). Pneumonia may be caused by a virus or bacteria. Pneumonia caused by bacteria is usually treated with an antibiotic. Severe cases may need to be treated in the hospital. Milder cases can be treated at home. Symptoms usually start to get better during the first 2 days of treatment.    Home care  Follow these guidelines when caring for yourself at home:  · Rest at home for the first 2 to 3 days, or until you feel stronger. Dont let yourself get overly tired when you go back to your activities.  · Stay away from cigarette smoke - yours or other peoples.  · You may use acetaminophen or ibuprofen to control fever or pain, unless another medicine was prescribed. If you have chronic liver or kidney disease, talk with your healthcare provider before using these medicines. Also talk with your provider if youve had a stomach ulcer or gastrointestinal bleeding. Dont give aspirin to anyone younger than 18 years of age who is ill with a fever. It may cause severe liver damage.  · Your appetite may be poor, so a light diet is fine.  · Drink 6 to 8 glasses of fluids every day to make sure you are getting enough fluids. Beverages can include water, sport drinks, sodas without caffeine, juices, tea, or soup. Fluids will help loosen secretions in the lung. This will make it easier for you to cough up the phlegm (sputum). If you also have heart or kidney disease, check with your healthcare provider before you drink extra fluids.  · Take antibiotic medicine prescribed until it is all gone, even if you are feeling better after a few days.  Follow-up care  Follow up with your healthcare provider in the next 2 to 3 days, or as advised. This is to be sure the medicine is helping you get better.  If you are 65 or older, you should  get a pneumococcal vaccine and a yearly flu (influenza) shot. You should also get these vaccines if you have chronic lung disease like asthma, emphysema, or COPD. Recently, a second type of pneumonia vaccine has become available for everyone over 65 years old. This is in addition to the previous vaccine. Ask your provider about this.  When to seek medical advice  Call your healthcare provider right away if any of these occur:  · You dont get better within the first 48 hours of treatment  · Shortness of breath gets worse  · Rapid breathing (more than 25 breaths per minute)  · Coughing up blood  · Chest pain gets worse with breathing  · Fever of 100.4°F (38°C) or higher that doesnt get better with fever medicine  · Weakness, dizziness, or fainting that gets worse  · Thirst or dry mouth that gets worse  · Sinus pain, headache, or a stiff neck  · Chest pain not caused by coughing  Date Last Reviewed: 1/1/2017  © 7726-0542 [employer]. [address] [place] [address], [place] [address]. All rights reserved. This information is not intended as a substitute for professional medical care. Always follow your healthcare professional's instructions.

## 2018-02-26 NOTE — PROGRESS NOTES
"Subjective:      Patient ID: Wendie Reeves is a 52 y.o. female.    Chief Complaint: No chief complaint on file.    HPI   Patient to clinic with concern that she may have pneumonia.  She reports Thursday she had sudden onset fever, coughing, nausea, vomiting and received care at walk in clinic in [place].  She tested negative for the flu but was treated with Tamiflu and given a Z-pack.  She reports she did not start Z-pack until yesterday.  She reports she has had fever up to 103F for 3 days, no fever today.  She voices associated fatigue, headache, and right sided chest discomfort, especially with cough, deep breath, and when lying on her right side.  Her fever and headache are no longer present.  She voices a history of pneumonia multiple times and fears she may have it again.  Patient also reports she gets yeast infections with multiple antibiotics and requests to have something for this if another antibiotic is needed.    Review of Systems   Constitutional: Positive for activity change, chills, diaphoresis, fatigue and fever.   HENT: Negative for congestion, postnasal drip, rhinorrhea, sinus pressure, sneezing and sore throat.    Respiratory: Positive for cough and chest tightness. Negative for shortness of breath and wheezing.    Cardiovascular: Negative for chest pain, palpitations and leg swelling.   Gastrointestinal: Positive for nausea and vomiting. Negative for abdominal distention, constipation and diarrhea.   Skin: Negative for rash and wound.   Neurological: Positive for light-headedness and headaches. Negative for weakness and numbness.   Psychiatric/Behavioral: The patient is not nervous/anxious.        Objective:     /87   Pulse 88   Temp 98.2 °F (36.8 °C) (Oral)   Resp 16   Ht 5' 7" (1.702 m)   Wt 59.9 kg (132 lb)   LMP 01/05/2014   SpO2 100%   BMI 20.67 kg/m²     Physical Exam   Constitutional: She is oriented to person, place, and time. She appears well-developed and " well-nourished.   HENT:   Head: Normocephalic.   Right Ear: Tympanic membrane normal.   Left Ear: Tympanic membrane normal.   Nose: Rhinorrhea present. No mucosal edema. Right sinus exhibits no maxillary sinus tenderness and no frontal sinus tenderness. Left sinus exhibits no maxillary sinus tenderness and no frontal sinus tenderness.   Mouth/Throat: No oropharyngeal exudate, posterior oropharyngeal edema or posterior oropharyngeal erythema.   Eyes: Pupils are equal, round, and reactive to light.   Cardiovascular: Normal rate, regular rhythm and normal heart sounds.    Pulmonary/Chest: Effort normal. No respiratory distress. She has no decreased breath sounds. She has no wheezes. She has no rhonchi. She has rales in the right lower field.   Abdominal: Soft. Bowel sounds are normal. She exhibits no distension and no mass. There is no tenderness. There is no rebound and no guarding.   Lymphadenopathy:     She has no cervical adenopathy.   Neurological: She is alert and oriented to person, place, and time.   Skin: Skin is warm and dry. No rash noted.   Psychiatric: She has a normal mood and affect. Her behavior is normal. Judgment and thought content normal.   Vitals reviewed.  Details     Reading Physician Reading Date Result Priority   Dixon Gomez, DO 2/26/2018    Narrative     2 view chest    Comparison: 12/11/13    Findings: There is hazy parenchymal opacification seen on the PA film within the right lung base that appears to correspond to some increased opacification overlying the region of the lower thoracic spine on the lateral film and therefore is favored to be within the right lower lobe.  This infiltrate is new when compared to the prior exam.  The previously noted right lower lobe infiltrate has resolved..  The heart is not enlarged.      Impression         1.  Findings highly suspicious for right lower lobe pneumonia.  Followup to resolution is recommended.  Previously noted infiltrate within the  right lower lobe on the study from 2013 has resolved.        Electronically signed by: LUCINDA HARRELL D.O.  Date: 02/26/18  Time: 12:22           Assessment:     1. Pneumonia of right lower lobe due to infectious organism    2. Influenza-like symptoms    3. Cough    4. History of pneumonia    5. Nausea    6. Vaginal yeast infection        Plan:     Problem List Items Addressed This Visit     None      Visit Diagnoses     Pneumonia of right lower lobe due to infectious organism    -  Primary    Relevant Medications    cefdinir (OMNICEF) 300 MG capsule    guaiFENesin 1,200 mg Ta12    predniSONE (DELTASONE) 20 MG tablet    Other Relevant Orders    X-Ray Chest PA And Lateral (Completed)    X-Ray Chest PA And Lateral    Culture, Respiratory with Gram Stain    Influenza-like symptoms        Relevant Orders    X-Ray Chest PA And Lateral (Completed)    Cough        Relevant Medications    guaiFENesin 1,200 mg Ta12    Other Relevant Orders    X-Ray Chest PA And Lateral (Completed)    History of pneumonia        Relevant Orders    X-Ray Chest PA And Lateral (Completed)    Nausea        Relevant Medications    hydrOXYzine pamoate (VISTARIL) 25 MG Cap    Vaginal yeast infection        Relevant Medications    fluconazole (DIFLUCAN) 150 MG Tab      Tylenol/Motrin OTC as needed for fever/pain  Hydrate well  Rest  Symptomatic care discussed and educational handout provided  Report to ER if symptoms worsen    Follow-up in about 6 weeks (around 4/9/2018), or if symptoms worsen or fail to improve, for repeat CXR and f/u on pneumonia.

## 2018-02-26 NOTE — LETTER
February 26, 2018      [facility]  [address] [address]  Phone: [phone]  Fax: [phone]       Patient: Wendie Reeves   YOB: 1965  Date of Visit: 02/26/2018    To Whom It May Concern:    Mandeep Reeves  was at [facility] on 02/26/2018. Please excuse her 2/26/2018 until 03/02/2018.  She may return to work/school on 03/02/2018 with no restrictions. If you have any questions or concerns, or if I can be of further assistance, please do not hesitate to contact me.    Sincerely,        Guilherme Mejia, NP

## 2018-03-02 ENCOUNTER — OFFICE VISIT (OUTPATIENT)
Dept: FAMILY MEDICINE | Facility: CLINIC | Age: 53
End: 2018-03-02
Payer: COMMERCIAL

## 2018-03-02 VITALS
HEART RATE: 71 BPM | HEIGHT: 67 IN | BODY MASS INDEX: 21.06 KG/M2 | OXYGEN SATURATION: 98 % | DIASTOLIC BLOOD PRESSURE: 89 MMHG | SYSTOLIC BLOOD PRESSURE: 153 MMHG | WEIGHT: 134.19 LBS | TEMPERATURE: 98 F

## 2018-03-02 DIAGNOSIS — J18.9 PNEUMONIA OF RIGHT LOWER LOBE DUE TO INFECTIOUS ORGANISM: Primary | ICD-10-CM

## 2018-03-02 PROCEDURE — 99213 OFFICE O/P EST LOW 20 MIN: CPT | Mod: 25,S$GLB,, | Performed by: NURSE PRACTITIONER

## 2018-03-02 PROCEDURE — 99999 PR PBB SHADOW E&M-EST. PATIENT-LVL III: CPT | Mod: PBBFAC,,, | Performed by: NURSE PRACTITIONER

## 2018-03-02 PROCEDURE — 96372 THER/PROPH/DIAG INJ SC/IM: CPT | Mod: S$GLB,,, | Performed by: FAMILY MEDICINE

## 2018-03-02 RX ORDER — PROMETHAZINE HYDROCHLORIDE AND DEXTROMETHORPHAN HYDROBROMIDE 6.25; 15 MG/5ML; MG/5ML
5 SYRUP ORAL 4 TIMES DAILY PRN
Qty: 240 ML | Refills: 0 | Status: SHIPPED | OUTPATIENT
Start: 2018-03-02 | End: 2018-03-05 | Stop reason: ALTCHOICE

## 2018-03-02 RX ORDER — METHYLPREDNISOLONE ACETATE 80 MG/ML
80 INJECTION, SUSPENSION INTRA-ARTICULAR; INTRALESIONAL; INTRAMUSCULAR; SOFT TISSUE
Status: COMPLETED | OUTPATIENT
Start: 2018-03-02 | End: 2018-03-02

## 2018-03-02 RX ORDER — CODEINE PHOSPHATE AND GUAIFENESIN 10; 100 MG/5ML; MG/5ML
10 SOLUTION ORAL EVERY 4 HOURS PRN
Qty: 240 ML | Refills: 0 | Status: CANCELLED | OUTPATIENT
Start: 2018-03-02 | End: 2018-03-12

## 2018-03-02 RX ORDER — CODEINE PHOSPHATE AND GUAIFENESIN 10; 100 MG/5ML; MG/5ML
10 SOLUTION ORAL EVERY 4 HOURS PRN
Qty: 240 ML | Refills: 0 | Status: SHIPPED | OUTPATIENT
Start: 2018-03-02 | End: 2018-03-02 | Stop reason: ALTCHOICE

## 2018-03-02 RX ADMIN — METHYLPREDNISOLONE ACETATE 80 MG: 80 INJECTION, SUSPENSION INTRA-ARTICULAR; INTRALESIONAL; INTRAMUSCULAR; SOFT TISSUE at 03:03

## 2018-03-02 NOTE — PROGRESS NOTES
"Subjective:      Patient ID: Wendie Reeves is a 52 y.o. female.    Chief Complaint: Follow-up; Chest Pain; and Cough (dry)    HPI   Patient to clinic for a follow up after diagnosis of pneumonia with complaint of worsening cough at night and now with muscle aching due to coughing.  She reports she is no longer having fever as before, highest temp readings up to 99.5F at night.  She reports it is still painful to lay on right side so she sleeps sitting up in chair.  She reports occasional shortness of breath with exertion, but denies wheezing.  She does occasionally take Norco for chronic pain but voices she has not taken this in the last several days.    Review of Systems   Constitutional: Negative for chills, fatigue and fever.   HENT: Negative.    Eyes: Negative.    Respiratory: Positive for cough and shortness of breath. Negative for wheezing.    Cardiovascular: Positive for chest pain. Negative for palpitations and leg swelling.   Endocrine: Negative.    Genitourinary: Negative.    Musculoskeletal: Positive for back pain (right sided back pain).   Skin: Negative for rash and wound.   Neurological: Negative for weakness and numbness.   Psychiatric/Behavioral: The patient is not nervous/anxious.        Objective:     BP (!) 153/89 (BP Location: Left arm, Patient Position: Sitting, BP Method: Small (Automatic))   Pulse 71   Temp 98.3 °F (36.8 °C)   Ht 5' 7" (1.702 m)   Wt 60.9 kg (134 lb 3.2 oz)   LMP 01/05/2014   SpO2 98%   BMI 21.02 kg/m²     Physical Exam   Constitutional: She is oriented to person, place, and time. She appears well-developed and well-nourished.   HENT:   Head: Normocephalic.   Eyes: Pupils are equal, round, and reactive to light.   Cardiovascular: Normal rate, regular rhythm and normal heart sounds.    Pulmonary/Chest: Effort normal and breath sounds normal. No respiratory distress. She has no decreased breath sounds. She has no wheezes. She has no rhonchi. She has no rales. "   Lymphadenopathy:     She has no cervical adenopathy.   Neurological: She is alert and oriented to person, place, and time.   Skin: Skin is warm and dry. No rash noted.   Psychiatric: She has a normal mood and affect. Her behavior is normal. Judgment and thought content normal.   Vitals reviewed.    Assessment:     1. Pneumonia of right lower lobe due to infectious organism        Plan:     Problem List Items Addressed This Visit     None      Visit Diagnoses     Pneumonia of right lower lobe due to infectious organism    -  Primary    Relevant Medications    methylPREDNISolone acetate injection 80 mg (Completed)      Continue Mucinex BID and Tylenol/Motrin OTC for pain/fever  Hydrate  Rest  Report to ER if symptoms worsen    Follow-up in about 6 weeks (around 4/13/2018), or if symptoms worsen or fail to improve, for re-eval of pneumonia.

## 2018-03-02 NOTE — TELEPHONE ENCOUNTER
Pt states the prescriptions were sent in to the wrong [facility] she needs the prescriptions sent to to [facility] in [place]. Will not let me reorder Medrol Dose Reynaldo. Rx canceled at [facility] in [place].

## 2018-03-03 NOTE — TELEPHONE ENCOUNTER
I spoke with patient.  Would not recommend codeine as on chronic hydrocodone.  Prescription sent Phenergan DM.

## 2018-03-05 ENCOUNTER — TELEPHONE (OUTPATIENT)
Dept: FAMILY MEDICINE | Facility: CLINIC | Age: 53
End: 2018-03-05

## 2018-03-05 DIAGNOSIS — J18.9 PNEUMONIA OF RIGHT LOWER LOBE DUE TO INFECTIOUS ORGANISM: Primary | ICD-10-CM

## 2018-03-05 RX ORDER — CODEINE PHOSPHATE AND GUAIFENESIN 10; 100 MG/5ML; MG/5ML
10 SOLUTION ORAL NIGHTLY PRN
Qty: 115 ML | Refills: 0 | Status: SHIPPED | OUTPATIENT
Start: 2018-03-05 | End: 2018-03-15

## 2018-03-05 NOTE — TELEPHONE ENCOUNTER
----- Message from Nimo Morris sent at 3/2/2018  4:28 PM CST -----  rx should've been called into    [facility] on [address] in [place]

## 2018-03-05 NOTE — TELEPHONE ENCOUNTER
On Friday I discussed with patient that she has a rx for Norco for pain but she reports she does not use it daily and has not taken it in several days.  I discussed at that time sending in a one time rx for a cough syrup with codeine.  That rx went to her pharmacy in [place] and she was not going to [place] that day so after hours she requested it sent to local pharmacy.  Phenergan DM was sent in by on call MD due to her use of Norco.  Patient reports she has taken this and it does not work.  She still confirms she is not currently taking Norco and she has been educated on the harms of on concurrent use of codeine with hydrocodone and verbalizes understanding.  She has also been educated on dangers of prolonged opioid use and understands risks.  She will get a one time only rx for cough medication with codeine for her cough with pneumonia.

## 2018-03-05 NOTE — TELEPHONE ENCOUNTER
Patient states you discussed calling in a cough syrup with codeine in it. That went to the [facility] in [place]. Patient is back in [place] today for work and would like the cough syrup with codeine called in to the [facility] on [address].

## 2018-03-05 NOTE — TELEPHONE ENCOUNTER
----- Message from Veronique Esqueda sent at 3/5/2018  7:29 AM CST -----  Contact: [phone]  Returning call,please call back at [phone].  Thx-AH

## 2018-03-27 ENCOUNTER — OFFICE VISIT (OUTPATIENT)
Dept: FAMILY MEDICINE | Facility: CLINIC | Age: 53
End: 2018-03-27
Payer: COMMERCIAL

## 2018-03-27 VITALS
TEMPERATURE: 98 F | DIASTOLIC BLOOD PRESSURE: 88 MMHG | HEART RATE: 82 BPM | BODY MASS INDEX: 20.38 KG/M2 | SYSTOLIC BLOOD PRESSURE: 137 MMHG | WEIGHT: 129.88 LBS | HEIGHT: 67 IN

## 2018-03-27 DIAGNOSIS — J18.9 PNEUMONIA OF RIGHT LOWER LOBE DUE TO INFECTIOUS ORGANISM: Primary | ICD-10-CM

## 2018-03-27 PROCEDURE — 99999 PR PBB SHADOW E&M-EST. PATIENT-LVL III: CPT | Mod: PBBFAC,,, | Performed by: FAMILY MEDICINE

## 2018-03-27 PROCEDURE — 99213 OFFICE O/P EST LOW 20 MIN: CPT | Mod: S$GLB,,, | Performed by: FAMILY MEDICINE

## 2018-03-27 NOTE — PROGRESS NOTES
Wendie Reeves presents with 1m hx  moderate upper respiratory congestion,rhinnorhea,moderate dry cough CXR poss RLL pN sp omnicef and medrol not improving. Denies nausea,vomiting,diarrhea or significant fever.    Past Medical History:   Diagnosis Date    Bulging disc     Fibrocystic breast      Past Surgical History:   Procedure Laterality Date    MANDIBLE FRACTURE SURGERY       Review of patient's allergies indicates:   Allergen Reactions    No known drug allergies      Current Outpatient Prescriptions on File Prior to Visit   Medication Sig Dispense Refill    COMBIPATCH 0.05-0.14 mg/24 hr APPLY TWICE A WEEK AS DIRECTED 24 patch 0    diclofenac (CATAFLAM) 50 MG tablet Take 1 tablet (50 mg total) by mouth 2 (two) times daily. 180 tablet 4    hydrocodone-acetaminophen 10-325mg (NORCO)  mg Tab Take 1 tablet by mouth 4 (four) times daily. 120 tablet 0    hydrOXYzine pamoate (VISTARIL) 25 MG Cap Take 1 capsule (25 mg total) by mouth every 6 (six) hours as needed (nausea). 28 capsule 0    zolpidem (AMBIEN) 10 mg Tab Take 1 tablet (10 mg total) by mouth nightly as needed. 30 tablet 0     No current facility-administered medications on file prior to visit.      Social History     Social History    Marital status: Single     Spouse name: N/A    Number of children: N/A    Years of education: N/A     Occupational History     [employer]     Social History Main Topics    Smoking status: Never Smoker    Smokeless tobacco: Never Used    Alcohol use No    Drug use: No    Sexual activity: Yes     Birth control/ protection: Post-menopausal     Other Topics Concern    Not on file     Social History Narrative    No narrative on file     Family History   Problem Relation Age of Onset    Hypertension Mother     Diabetes Mother     Cancer Father     Breast cancer Paternal Aunt     Colon cancer Neg Hx     Ovarian cancer Neg Hx          ROS:  SKIN: No rashes, itching or changes in color or texture  of skin.  EYES: Visual acuity fine. No photophobia, ocular pain or diplopia.EARS: Denies ear pain, discharge or vertigo.NOSE: No loss of smell, no epistaxis some postnasal drip.MOUTH & THROAT: No hoarseness or change in voice. No excessive gum bleeding.CHEST: Denies ARROYO, cyanosis, wheezing  CARDIOVASCULAR: Denies chest pain, PND, orthopnea or reduced exercise tolerance.  ABDOMEN:  No weight loss.No abdominal pain, no hematemesis or blood in stool.  URINARY: No flank pain, dysuria or hematuria.  PERIPHERAL VASCULAR: No claudication or cyanosis.  MUSCULOSKELETAL: Negative   NEUROLOGIC: No history of seizures, paralysis, alteration of gait or coordination.    PE: Vital signs as noted  Heent:Normocephalic with no recent cranial trauma,PERRLA,EOMI,conjunctiva clear,fundi reveal no hemmorhage exudate or papilledema.Otic canals clear, tympanic membranes slightly dull bilaterally.Nasal mucosa slightly red and edematous.Posterior pharynx slightly red but without exudate.  Neck:Supple with minimal anterior cervical adenopathy.  Chest:Clear bilateral breath sounds with mild scattered ronchi  Heart:Regular rhthym without murmer  Abdomen:Soft, non tender,no masses, no hepatosplenomegalyExtremeties and Neurologic:Grossly within normal limits  Impression: Upper Respiratory Infection. 465.9/Recurrent Pn   Plan: CT chest recurrent Pneumonia

## 2018-03-28 ENCOUNTER — PATIENT OUTREACH (OUTPATIENT)
Dept: ADMINISTRATIVE | Facility: HOSPITAL | Age: 53
End: 2018-03-28

## 2018-03-28 NOTE — PROGRESS NOTES
Lipid panel order pended. Pre-Visit Chart audit complete. Alert PCP/Staff concerning HM updates needed.

## 2018-04-11 ENCOUNTER — HOSPITAL ENCOUNTER (OUTPATIENT)
Dept: RADIOLOGY | Facility: HOSPITAL | Age: 53
Discharge: HOME | End: 2018-04-11
Attending: NURSE PRACTITIONER
Payer: COMMERCIAL

## 2018-04-11 ENCOUNTER — OFFICE VISIT (OUTPATIENT)
Dept: FAMILY MEDICINE | Facility: CLINIC | Age: 53
End: 2018-04-11
Payer: COMMERCIAL

## 2018-04-11 VITALS
HEIGHT: 67 IN | TEMPERATURE: 98 F | SYSTOLIC BLOOD PRESSURE: 137 MMHG | HEART RATE: 61 BPM | BODY MASS INDEX: 20.28 KG/M2 | WEIGHT: 129.19 LBS | DIASTOLIC BLOOD PRESSURE: 79 MMHG

## 2018-04-11 DIAGNOSIS — J18.9 PNEUMONIA OF RIGHT LOWER LOBE DUE TO INFECTIOUS ORGANISM: ICD-10-CM

## 2018-04-11 DIAGNOSIS — J18.9 PNEUMONIA OF BOTH LUNGS DUE TO INFECTIOUS ORGANISM, UNSPECIFIED PART OF LUNG: Primary | ICD-10-CM

## 2018-04-11 DIAGNOSIS — M48.9 CERVICAL SPINE DISEASE: ICD-10-CM

## 2018-04-11 PROCEDURE — 99999 PR PBB SHADOW E&M-EST. PATIENT-LVL III: CPT | Mod: PBBFAC,,, | Performed by: FAMILY MEDICINE

## 2018-04-11 PROCEDURE — 99213 OFFICE O/P EST LOW 20 MIN: CPT | Mod: S$GLB,,, | Performed by: FAMILY MEDICINE

## 2018-04-11 PROCEDURE — 71046 X-RAY EXAM CHEST 2 VIEWS: CPT | Mod: 26,,, | Performed by: RADIOLOGY

## 2018-04-11 PROCEDURE — 71046 X-RAY EXAM CHEST 2 VIEWS: CPT | Mod: TC,PO

## 2018-04-11 RX ORDER — HYDROCODONE BITARTRATE AND ACETAMINOPHEN 10; 325 MG/1; MG/1
1 TABLET ORAL EVERY 4 HOURS PRN
Qty: 120 TABLET | Refills: 0 | Status: CANCELLED | OUTPATIENT
Start: 2018-06-09

## 2018-04-11 RX ORDER — ZOLPIDEM TARTRATE 10 MG/1
10 TABLET ORAL NIGHTLY PRN
Qty: 30 TABLET | Refills: 5 | Status: SHIPPED | OUTPATIENT
Start: 2018-04-11 | End: 2018-10-18 | Stop reason: SDUPTHER

## 2018-04-11 RX ORDER — HYDROCODONE BITARTRATE AND ACETAMINOPHEN 10; 325 MG/1; MG/1
1 TABLET ORAL EVERY 4 HOURS PRN
Qty: 120 TABLET | Refills: 0 | Status: CANCELLED | OUTPATIENT
Start: 2018-05-11

## 2018-04-11 RX ORDER — DICLOFENAC POTASSIUM 50 MG/1
50 TABLET, FILM COATED ORAL 2 TIMES DAILY
Qty: 180 TABLET | Refills: 4 | Status: SHIPPED | OUTPATIENT
Start: 2018-04-11 | End: 2019-01-09

## 2018-04-11 RX ORDER — HYDROCODONE BITARTRATE AND ACETAMINOPHEN 10; 325 MG/1; MG/1
1 TABLET ORAL 4 TIMES DAILY
Qty: 120 TABLET | Refills: 0 | Status: SHIPPED | OUTPATIENT
Start: 2018-04-11 | End: 2018-07-11 | Stop reason: SDUPTHER

## 2018-04-11 NOTE — PROGRESS NOTES
Wendie Reeves presents with 2m hx  moderate upper respiratory congestion,rhinnorhea,moderate dry cough CXR poss RLL pN sp omnicef and medrol just improving p week.Rev CT     Past Medical History:   Diagnosis Date    Bulging disc     Fibrocystic breast      Past Surgical History:   Procedure Laterality Date    MANDIBLE FRACTURE SURGERY       Review of patient's allergies indicates:   Allergen Reactions    No known drug allergies      Current Outpatient Prescriptions on File Prior to Visit   Medication Sig Dispense Refill    COMBIPATCH 0.05-0.14 mg/24 hr APPLY TWICE A WEEK AS DIRECTED 24 patch 0    [DISCONTINUED] diclofenac (CATAFLAM) 50 MG tablet Take 1 tablet (50 mg total) by mouth 2 (two) times daily. 180 tablet 4    [DISCONTINUED] hydrocodone-acetaminophen 10-325mg (NORCO)  mg Tab Take 1 tablet by mouth 4 (four) times daily. 120 tablet 0    [DISCONTINUED] zolpidem (AMBIEN) 10 mg Tab Take 1 tablet (10 mg total) by mouth nightly as needed. 30 tablet 0     No current facility-administered medications on file prior to visit.      Social History     Social History    Marital status: Single     Spouse name: N/A    Number of children: N/A    Years of education: N/A     Occupational History     [employer]     Social History Main Topics    Smoking status: Never Smoker    Smokeless tobacco: Never Used    Alcohol use No    Drug use: No    Sexual activity: Yes     Birth control/ protection: Post-menopausal     Other Topics Concern    Not on file     Social History Narrative    No narrative on file     Family History   Problem Relation Age of Onset    Hypertension Mother     Diabetes Mother     Cancer Father     Breast cancer Paternal Aunt     Colon cancer Neg Hx     Ovarian cancer Neg Hx          ROS:  SKIN: No rashes, itching or changes in color or texture of skin.  EYES: Visual acuity fine. No photophobia, ocular pain or diplopia.EARS: Denies ear pain, discharge or vertigo.NOSE: No  loss of smell, no epistaxis some postnasal drip.MOUTH & THROAT: No hoarseness or change in voice. No excessive gum bleeding.CHEST: Denies ARROYO, cyanosis, wheezing  CARDIOVASCULAR: Denies chest pain, PND, orthopnea or reduced exercise tolerance.  ABDOMEN:  No weight loss.No abdominal pain, no hematemesis or blood in stool.  URINARY: No flank pain, dysuria or hematuria.  PERIPHERAL VASCULAR: No claudication or cyanosis.  MUSCULOSKELETAL: Negative   NEUROLOGIC: No history of seizures, paralysis, alteration of gait or coordination.    PE: Vital signs as noted  Heent:Normocephalic with no recent cranial trauma,PERRLA,EOMI,conjunctiva clear,fundi reveal no hemmorhage exudate or papilledema.Otic canals clear, tympanic membranes slightly dull bilaterally.Nasal mucosa slightly red and edematous.Posterior pharynx slightly red but without exudate.  Neck:Supple with minimal anterior cervical adenopathy.  Chest:Clear bilateral breath sounds with mild scattered ronchi  Heart:Regular rhthym without murmer  Abdomen:Soft, non tender,no masses, no hepatosplenomegalyExtremeties and Neurologic:Grossly within normal limits  Impression: Upper Respiratory Infection. 465.9/Recurrent Pn   Plan: CT chest rev  Pulm consult

## 2018-06-27 ENCOUNTER — PATIENT OUTREACH (OUTPATIENT)
Dept: ADMINISTRATIVE | Facility: HOSPITAL | Age: 53
End: 2018-06-27

## 2018-07-11 ENCOUNTER — OFFICE VISIT (OUTPATIENT)
Dept: FAMILY MEDICINE | Facility: CLINIC | Age: 53
End: 2018-07-11
Payer: COMMERCIAL

## 2018-07-11 VITALS
HEART RATE: 61 BPM | WEIGHT: 127 LBS | SYSTOLIC BLOOD PRESSURE: 137 MMHG | TEMPERATURE: 98 F | DIASTOLIC BLOOD PRESSURE: 75 MMHG | BODY MASS INDEX: 19.93 KG/M2 | HEIGHT: 67 IN

## 2018-07-11 DIAGNOSIS — M48.9 CERVICAL SPINE DISEASE: Primary | ICD-10-CM

## 2018-07-11 PROCEDURE — 99213 OFFICE O/P EST LOW 20 MIN: CPT | Mod: S$GLB,,, | Performed by: FAMILY MEDICINE

## 2018-07-11 PROCEDURE — 99999 PR PBB SHADOW E&M-EST. PATIENT-LVL III: CPT | Mod: PBBFAC,,, | Performed by: FAMILY MEDICINE

## 2018-07-11 RX ORDER — MELOXICAM 15 MG/1
15 TABLET ORAL DAILY
Qty: 90 TABLET | Refills: 3 | Status: SHIPPED | OUTPATIENT
Start: 2018-07-11 | End: 2018-10-10 | Stop reason: SDUPTHER

## 2018-07-11 RX ORDER — HYDROCODONE BITARTRATE AND ACETAMINOPHEN 10; 325 MG/1; MG/1
1 TABLET ORAL 4 TIMES DAILY PRN
Qty: 120 TABLET | Refills: 0 | Status: CANCELLED | OUTPATIENT
Start: 2018-09-08

## 2018-07-11 RX ORDER — HYDROCODONE BITARTRATE AND ACETAMINOPHEN 10; 325 MG/1; MG/1
1 TABLET ORAL 4 TIMES DAILY
Qty: 120 TABLET | Refills: 0 | Status: SHIPPED | OUTPATIENT
Start: 2018-07-11 | End: 2018-10-10

## 2018-07-11 RX ORDER — HYDROCODONE BITARTRATE AND ACETAMINOPHEN 10; 325 MG/1; MG/1
1 TABLET ORAL 4 TIMES DAILY PRN
Qty: 120 TABLET | Refills: 0 | Status: CANCELLED | OUTPATIENT
Start: 2018-08-10

## 2018-07-25 RX ORDER — ESTRADIOL/NORETHINDRONE ACETATE TRANSDERMAL SYSTEM .05; .14 MG/D; MG/D
PATCH, EXTENDED RELEASE TRANSDERMAL
Qty: 24 PATCH | Refills: 0 | Status: SHIPPED | OUTPATIENT
Start: 2018-07-25 | End: 2018-12-20 | Stop reason: SDUPTHER

## 2018-09-26 ENCOUNTER — PATIENT MESSAGE (OUTPATIENT)
Dept: ADMINISTRATIVE | Facility: HOSPITAL | Age: 53
End: 2018-09-26

## 2018-09-26 ENCOUNTER — PATIENT OUTREACH (OUTPATIENT)
Dept: ADMINISTRATIVE | Facility: HOSPITAL | Age: 53
End: 2018-09-26

## 2018-10-10 ENCOUNTER — OFFICE VISIT (OUTPATIENT)
Dept: FAMILY MEDICINE | Facility: CLINIC | Age: 53
End: 2018-10-10
Payer: COMMERCIAL

## 2018-10-10 VITALS
WEIGHT: 130.75 LBS | DIASTOLIC BLOOD PRESSURE: 84 MMHG | HEIGHT: 67 IN | HEART RATE: 67 BPM | BODY MASS INDEX: 20.52 KG/M2 | SYSTOLIC BLOOD PRESSURE: 126 MMHG | TEMPERATURE: 98 F

## 2018-10-10 DIAGNOSIS — M48.9 CERVICAL SPINE DISEASE: Primary | ICD-10-CM

## 2018-10-10 PROCEDURE — 99999 PR PBB SHADOW E&M-EST. PATIENT-LVL III: CPT | Mod: PBBFAC,,, | Performed by: FAMILY MEDICINE

## 2018-10-10 PROCEDURE — 99213 OFFICE O/P EST LOW 20 MIN: CPT | Mod: S$GLB,,, | Performed by: FAMILY MEDICINE

## 2018-10-10 RX ORDER — HYDROCODONE BITARTRATE AND ACETAMINOPHEN 10; 325 MG/1; MG/1
1 TABLET ORAL 4 TIMES DAILY PRN
Qty: 120 TABLET | Refills: 0 | Status: CANCELLED | OUTPATIENT
Start: 2018-11-09

## 2018-10-10 RX ORDER — HYDROCODONE BITARTRATE AND ACETAMINOPHEN 10; 325 MG/1; MG/1
1 TABLET ORAL 4 TIMES DAILY PRN
Qty: 120 TABLET | Refills: 0 | Status: SHIPPED | OUTPATIENT
Start: 2018-10-10 | End: 2019-01-09 | Stop reason: SDUPTHER

## 2018-10-10 RX ORDER — MELOXICAM 15 MG/1
15 TABLET ORAL DAILY
Qty: 90 TABLET | Refills: 3 | Status: SHIPPED | OUTPATIENT
Start: 2018-10-10 | End: 2019-10-25 | Stop reason: SDUPTHER

## 2018-10-10 RX ORDER — HYDROCODONE BITARTRATE AND ACETAMINOPHEN 10; 325 MG/1; MG/1
1 TABLET ORAL 4 TIMES DAILY
Qty: 120 TABLET | Refills: 0 | Status: CANCELLED | OUTPATIENT
Start: 2018-10-10

## 2018-10-10 RX ORDER — HYDROCODONE BITARTRATE AND ACETAMINOPHEN 10; 325 MG/1; MG/1
1 TABLET ORAL 4 TIMES DAILY PRN
Qty: 120 TABLET | Refills: 0 | Status: SHIPPED | OUTPATIENT
Start: 2018-12-08 | End: 2018-10-10 | Stop reason: SDUPTHER

## 2018-10-10 NOTE — PROGRESS NOTES
The patient presents today to fu arthralgia more stable resp to HC and celebrex. Rev Pulm con. Pt had Prev 13  Past Medical History:  Past Medical History:   Diagnosis Date    Bulging disc     Fibrocystic breast      Past Surgical History:   Procedure Laterality Date    MANDIBLE FRACTURE SURGERY       Review of patient's allergies indicates:   Allergen Reactions    No known drug allergies      Current Outpatient Medications on File Prior to Visit   Medication Sig Dispense Refill    COMBIPATCH 0.05-0.14 mg/24 hr APPLY TWICE A WEEK AS DIRECTED 24 patch 0    diclofenac (CATAFLAM) 50 MG tablet Take 1 tablet (50 mg total) by mouth 2 (two) times daily. 180 tablet 4    HYDROcodone-acetaminophen (NORCO)  mg per tablet Take 1 tablet by mouth 4 (four) times daily. 120 tablet 0    meloxicam (MOBIC) 15 MG tablet Take 1 tablet (15 mg total) by mouth once daily. 90 tablet 3    zolpidem (AMBIEN) 10 mg Tab Take 1 tablet (10 mg total) by mouth nightly as needed. 30 tablet 5     No current facility-administered medications on file prior to visit.      Social History     Socioeconomic History    Marital status: Single     Spouse name: Not on file    Number of children: Not on file    Years of education: Not on file    Highest education level: Not on file   Social Needs    Financial resource strain: Not on file    Food insecurity - worry: Not on file    Food insecurity - inability: Not on file    Transportation needs - medical: Not on file    Transportation needs - non-medical: Not on file   Occupational History     Employer: [employer]   Tobacco Use    Smoking status: Never Smoker    Smokeless tobacco: Never Used   Substance and Sexual Activity    Alcohol use: No    Drug use: No    Sexual activity: Yes     Birth control/protection: Post-menopausal   Other Topics Concern    Not on file   Social History Narrative    Not on file     Family History   Problem Relation Age of Onset    Hypertension  Mother     Diabetes Mother     Cancer Father     Breast cancer Paternal Aunt     Colon cancer Neg Hx     Ovarian cancer Neg Hx          ROS:GENERAL: No fever, chills, fatigability or weight loss.  SKIN: No rashes, itching or changes in color or texture of skin.  HEAD: No headaches or recent head trauma.EYES: Visual acuity fine. No photophobia, ocular pain or diplopia.EARS: Denies ear pain, discharge or vertigo.NOSE: No loss of smell, no epistaxis or postnasal drip.MOUTH & THROAT: No hoarseness or change in voice. No excessive gum bleeding.NODES: Denies swollen glands.  CHEST: Denies ARROYO, cyanosis, wheezing, cough and sputum production.  CARDIOVASCULAR: Denies chest pain, PND, orthopnea or reduced exercise tolerance.  ABDOMEN: Appetite fine. No weight loss. Denies diarrhea, abdominal pain, hematemesis or blood in stool.  URINARY: No flank pain, dysuria or hematuria.  PERIPHERAL VASCULAR: No claudication or cyanosis.  MUSCULOSKELETAL: See above.  NEUROLOGIC: No history of seizures, paralysis, alteration of gait or coordination.  PE:   HEAD: Normocephalic, atraumatic.EYES: PERRL. EOMI.   EARS: TM's intact. Light reflex normal. No retraction or perforation.   NOSE: Mucosa pink. Airway clear.MOUTH & THROAT: No tonsillar enlargement. No pharyngeal erythema or exudate. No stridor.  NODES: No cervical, axillary or inguinal lymph node enlargement.  CHEST: Lungs clear to auscultation.  CARDIOVASCULAR: Normal S1, S2. No rubs, murmurs or gallops.  ABDOMEN: Bowel sounds normal. Not distended. Soft. No tenderness or masses.  MUSCULOSKELETAL: Mod gen degen changes NEUROLOGIC: Cranial Nerves: II-XII grossly intact.  Motor: 5/5 strength major flexors/extensors.  DTR's: Knees, Ankles 2+ and equal bilaterally; downgoing toes.  Sensory: Intact to light touch distally.  Gait & Posture: Normal gait and fine motion. No cerebellar signs.     Impression: Cervical and lumbar DDD  CD GONZALEZ  GERD  Plan:   Rec diet and ex recs  Rev ok HC for  sparing use and   Continue H2 and or gaviscon-rev PPI risk low Mg renal fx   Continue meloxicam 15

## 2018-10-18 RX ORDER — ZOLPIDEM TARTRATE 10 MG/1
TABLET ORAL
Qty: 30 TABLET | Refills: 5 | Status: SHIPPED | OUTPATIENT
Start: 2018-10-18 | End: 2019-05-13 | Stop reason: SDUPTHER

## 2018-12-11 ENCOUNTER — PATIENT OUTREACH (OUTPATIENT)
Dept: ADMINISTRATIVE | Facility: HOSPITAL | Age: 53
End: 2018-12-11

## 2018-12-11 ENCOUNTER — PATIENT MESSAGE (OUTPATIENT)
Dept: ADMINISTRATIVE | Facility: HOSPITAL | Age: 53
End: 2018-12-11

## 2018-12-21 RX ORDER — ESTRADIOL/NORETHINDRONE ACETATE TRANSDERMAL SYSTEM .05; .14 MG/D; MG/D
PATCH, EXTENDED RELEASE TRANSDERMAL
Qty: 24 PATCH | Refills: 0 | Status: SHIPPED | OUTPATIENT
Start: 2018-12-21 | End: 2019-08-14 | Stop reason: SDUPTHER

## 2018-12-26 ENCOUNTER — PATIENT OUTREACH (OUTPATIENT)
Dept: ADMINISTRATIVE | Facility: HOSPITAL | Age: 53
End: 2018-12-26

## 2019-01-09 ENCOUNTER — OFFICE VISIT (OUTPATIENT)
Dept: FAMILY MEDICINE | Facility: CLINIC | Age: 54
End: 2019-01-09
Payer: COMMERCIAL

## 2019-01-09 ENCOUNTER — PATIENT OUTREACH (OUTPATIENT)
Dept: ADMINISTRATIVE | Facility: HOSPITAL | Age: 54
End: 2019-01-09

## 2019-01-09 VITALS
HEIGHT: 67 IN | HEART RATE: 70 BPM | DIASTOLIC BLOOD PRESSURE: 67 MMHG | BODY MASS INDEX: 20.97 KG/M2 | TEMPERATURE: 98 F | SYSTOLIC BLOOD PRESSURE: 119 MMHG | WEIGHT: 133.63 LBS

## 2019-01-09 DIAGNOSIS — M25.552 PAIN OF BOTH HIP JOINTS: ICD-10-CM

## 2019-01-09 DIAGNOSIS — M25.551 PAIN OF BOTH HIP JOINTS: ICD-10-CM

## 2019-01-09 DIAGNOSIS — M54.50 CHRONIC BILATERAL LOW BACK PAIN WITHOUT SCIATICA: ICD-10-CM

## 2019-01-09 DIAGNOSIS — M48.9 CERVICAL SPINE DISEASE: Primary | ICD-10-CM

## 2019-01-09 DIAGNOSIS — J40 BRONCHITIS: ICD-10-CM

## 2019-01-09 DIAGNOSIS — G89.29 CHRONIC BILATERAL LOW BACK PAIN WITHOUT SCIATICA: ICD-10-CM

## 2019-01-09 PROCEDURE — 99214 PR OFFICE/OUTPT VISIT, EST, LEVL IV, 30-39 MIN: ICD-10-PCS | Mod: S$GLB,,, | Performed by: FAMILY MEDICINE

## 2019-01-09 PROCEDURE — 99999 PR PBB SHADOW E&M-EST. PATIENT-LVL III: ICD-10-PCS | Mod: PBBFAC,,, | Performed by: FAMILY MEDICINE

## 2019-01-09 PROCEDURE — 99214 OFFICE O/P EST MOD 30 MIN: CPT | Mod: S$GLB,,, | Performed by: FAMILY MEDICINE

## 2019-01-09 PROCEDURE — 99999 PR PBB SHADOW E&M-EST. PATIENT-LVL III: CPT | Mod: PBBFAC,,, | Performed by: FAMILY MEDICINE

## 2019-01-09 RX ORDER — AZITHROMYCIN 250 MG/1
250 TABLET, FILM COATED ORAL DAILY
Qty: 6 TABLET | Refills: 0 | Status: SHIPPED | OUTPATIENT
Start: 2019-01-09 | End: 2019-01-14

## 2019-01-09 RX ORDER — CARBINOXAMINE MALEATE 4 MG/1
TABLET ORAL
Refills: 0 | COMMUNITY
Start: 2019-01-02 | End: 2019-10-02

## 2019-01-09 RX ORDER — BENZONATATE 200 MG/1
CAPSULE ORAL
Refills: 0 | COMMUNITY
Start: 2019-01-02 | End: 2019-07-03

## 2019-01-09 RX ORDER — MONTELUKAST SODIUM 10 MG/1
10 TABLET ORAL NIGHTLY
Qty: 30 TABLET | Refills: 6 | Status: SHIPPED | OUTPATIENT
Start: 2019-01-09 | End: 2019-02-08

## 2019-01-09 RX ORDER — HYDROCODONE BITARTRATE AND ACETAMINOPHEN 10; 325 MG/1; MG/1
1 TABLET ORAL 4 TIMES DAILY PRN
Qty: 120 TABLET | Refills: 0 | Status: SHIPPED | OUTPATIENT
Start: 2019-01-09 | End: 2019-04-10 | Stop reason: SDUPTHER

## 2019-01-09 NOTE — PROGRESS NOTES
The patient presents today to fu arthralgia more stable resp to HC and celebrex. Rev Pulm con.Had uri tx conservatively then suddenly worse w purulent sputum and fevere.Past Medical History:  Past Medical History:   Diagnosis Date    Bulging disc     Fibrocystic breast      Past Surgical History:   Procedure Laterality Date    MANDIBLE FRACTURE SURGERY       Review of patient's allergies indicates:   Allergen Reactions    No known drug allergies      Current Outpatient Medications on File Prior to Visit   Medication Sig Dispense Refill    benzonatate (TESSALON) 200 MG capsule TK 1 C PO TID FOR 10 DAYS PRF COUGH  0    carbinoxamine maleate 4 mg Tab TK 1 T PO QID PRF COG  0    COMBIPATCH 0.05-0.14 mg/24 hr APPLY TWICE A WEEK AS DIRECTED 24 patch 0    HYDROcodone-acetaminophen (NORCO)  mg per tablet Take 1 tablet by mouth 4 (four) times daily as needed for Pain. 120 tablet 0    meloxicam (MOBIC) 15 MG tablet Take 1 tablet (15 mg total) by mouth once daily. 90 tablet 3    zolpidem (AMBIEN) 10 mg Tab TAKE 1 TABLET BY MOUTH EVERY DAY AS NEEDED 30 tablet 5    [DISCONTINUED] diclofenac (CATAFLAM) 50 MG tablet Take 1 tablet (50 mg total) by mouth 2 (two) times daily. 180 tablet 4     No current facility-administered medications on file prior to visit.      Social History     Socioeconomic History    Marital status:      Spouse name: Not on file    Number of children: Not on file    Years of education: Not on file    Highest education level: Not on file   Social Needs    Financial resource strain: Not on file    Food insecurity - worry: Not on file    Food insecurity - inability: Not on file    Transportation needs - medical: Not on file    Transportation needs - non-medical: Not on file   Occupational History     Employer: [employer]   Tobacco Use    Smoking status: Never Smoker    Smokeless tobacco: Never Used   Substance and Sexual Activity    Alcohol use: No    Drug use: No     Sexual activity: Yes     Birth control/protection: Post-menopausal   Other Topics Concern    Not on file   Social History Narrative    Not on file     Family History   Problem Relation Age of Onset    Hypertension Mother     Diabetes Mother     Cancer Father     Breast cancer Paternal Aunt     Colon cancer Neg Hx     Ovarian cancer Neg Hx          ROS:GENERAL: No fever, chills, fatigability or weight loss.  SKIN: No rashes, itching or changes in color or texture of skin.  HEAD: No headaches or recent head trauma.EYES: Visual acuity fine. No photophobia, ocular pain or diplopia.EARS: Denies ear pain, discharge or vertigo.NOSE: No loss of smell, no epistaxis or postnasal drip.MOUTH & THROAT: No hoarseness or change in voice. No excessive gum bleeding.NODES: Denies swollen glands.  CHEST: Denies ARROYO, cyanosis, wheezing, cough and sputum production.  CARDIOVASCULAR: Denies chest pain, PND, orthopnea or reduced exercise tolerance.  ABDOMEN: Appetite fine. No weight loss. Denies diarrhea, abdominal pain, hematemesis or blood in stool.  URINARY: No flank pain, dysuria or hematuria.  PERIPHERAL VASCULAR: No claudication or cyanosis.  MUSCULOSKELETAL: See above.  NEUROLOGIC: No history of seizures, paralysis, alteration of gait or coordination.  PE:   HEAD: Normocephalic, atraumatic.EYES: PERRL. EOMI.   EARS: TM's intact. Light reflex normal. No retraction or perforation.   NOSE: Mucosa pink. Airway clear.MOUTH & THROAT: No tonsillar enlargement. No pharyngeal erythema or exudate. No stridor.  NODES: No cervical, axillary or inguinal lymph node enlargement.  CHEST: Lungs clear to auscultation.  CARDIOVASCULAR: Normal S1, S2. No rubs, murmurs or gallops.  ABDOMEN: Bowel sounds normal. Not distended. Soft. No tenderness or masses.  MUSCULOSKELETAL: Mod gen degen changes NEUROLOGIC: Cranial Nerves: II-XII grossly intact.  Motor: 5/5 strength major flexors/extensors.  DTR's: Knees, Ankles 2+ and equal bilaterally;  downgoing toes.  Sensory: Intact to light touch distally.  Gait & Posture: Normal gait and fine motion. No cerebellar signs.     Impression: Cervical and lumbar DDD  Hip arthralgia  CD HA  Bronchitis   GERD  Plan:   Rec diet and ex recs  Rev ok HC for sparing use    Zpak   Continue H2 and or gaviscon-rev PPI risk low Mg renal fx   Continue meloxicam 15

## 2019-01-09 NOTE — PROGRESS NOTES
Flu shot dated 10/30/2018 administrated at [facility] entered. HM and Immunization updated. Record sent to scanning.

## 2019-01-18 ENCOUNTER — PATIENT MESSAGE (OUTPATIENT)
Dept: FAMILY MEDICINE | Facility: CLINIC | Age: 54
End: 2019-01-18

## 2019-02-25 ENCOUNTER — TELEPHONE (OUTPATIENT)
Dept: OBSTETRICS AND GYNECOLOGY | Facility: CLINIC | Age: 54
End: 2019-02-25

## 2019-03-27 ENCOUNTER — PATIENT OUTREACH (OUTPATIENT)
Dept: ADMINISTRATIVE | Facility: HOSPITAL | Age: 54
End: 2019-03-27

## 2019-03-27 NOTE — LETTER
March 27, 2019        Wendie Leandro  [address]  [place] [address]      Dear Mrs. Reeves,    You have an upcoming appointment with Dhaval Tyler MD on 4/10/19 @ 10:00 pm.      Your chart is indicating you may be due for the following and I will be happy to assist you in scheduling any needed appointments:  Health Maintenance Due   Topic    Lipid Panel     Pneumococcal Vaccine (Medium Risk) (1 of 1 - PPSV23)    Pap Smear with HPV Cotest     Mammogram      If you have had any of the above done at another facility, please bring the records or information with you so that your record at [facility] will be complete.    We will be happy to assist you with scheduling any necessary appointments or you may contact the [facility] appointment desk at [phone] to schedule at your convenience.     Thank you for choosing [facility] for your healthcare needs,      HELEN Bone  Care Coordination Department  [facility]  [phone]

## 2019-04-10 ENCOUNTER — HOSPITAL ENCOUNTER (OUTPATIENT)
Dept: RADIOLOGY | Facility: HOSPITAL | Age: 54
Discharge: HOME | End: 2019-04-10
Attending: FAMILY MEDICINE
Payer: COMMERCIAL

## 2019-04-10 ENCOUNTER — OFFICE VISIT (OUTPATIENT)
Dept: FAMILY MEDICINE | Facility: CLINIC | Age: 54
End: 2019-04-10
Payer: COMMERCIAL

## 2019-04-10 VITALS
WEIGHT: 128.19 LBS | DIASTOLIC BLOOD PRESSURE: 84 MMHG | BODY MASS INDEX: 20.12 KG/M2 | SYSTOLIC BLOOD PRESSURE: 136 MMHG | TEMPERATURE: 98 F | HEART RATE: 62 BPM | HEIGHT: 67 IN

## 2019-04-10 VITALS — HEIGHT: 67 IN | BODY MASS INDEX: 20.14 KG/M2 | WEIGHT: 128.31 LBS

## 2019-04-10 DIAGNOSIS — M85.80 OSTEOPENIA, UNSPECIFIED LOCATION: ICD-10-CM

## 2019-04-10 DIAGNOSIS — M54.50 CHRONIC BILATERAL LOW BACK PAIN WITHOUT SCIATICA: Primary | ICD-10-CM

## 2019-04-10 DIAGNOSIS — G89.29 CHRONIC NONINTRACTABLE HEADACHE, UNSPECIFIED HEADACHE TYPE: ICD-10-CM

## 2019-04-10 DIAGNOSIS — M25.552 PAIN OF BOTH HIP JOINTS: ICD-10-CM

## 2019-04-10 DIAGNOSIS — Z12.31 SCREENING MAMMOGRAM, ENCOUNTER FOR: ICD-10-CM

## 2019-04-10 DIAGNOSIS — M48.9 CERVICAL SPINE DISEASE: ICD-10-CM

## 2019-04-10 DIAGNOSIS — R51.9 CHRONIC NONINTRACTABLE HEADACHE, UNSPECIFIED HEADACHE TYPE: ICD-10-CM

## 2019-04-10 DIAGNOSIS — M25.551 PAIN OF BOTH HIP JOINTS: ICD-10-CM

## 2019-04-10 DIAGNOSIS — G89.29 CHRONIC BILATERAL LOW BACK PAIN WITHOUT SCIATICA: Primary | ICD-10-CM

## 2019-04-10 PROCEDURE — 99214 PR OFFICE/OUTPT VISIT, EST, LEVL IV, 30-39 MIN: ICD-10-PCS | Mod: S$GLB,,, | Performed by: FAMILY MEDICINE

## 2019-04-10 PROCEDURE — 77067 SCR MAMMO BI INCL CAD: CPT | Mod: TC,PO

## 2019-04-10 PROCEDURE — 77063 BREAST TOMOSYNTHESIS BI: CPT | Mod: 26,,, | Performed by: RADIOLOGY

## 2019-04-10 PROCEDURE — 99999 PR PBB SHADOW E&M-EST. PATIENT-LVL III: CPT | Mod: PBBFAC,,, | Performed by: FAMILY MEDICINE

## 2019-04-10 PROCEDURE — 77067 MAMMO DIGITAL SCREENING BILAT WITH TOMOSYNTHESIS_CAD: ICD-10-PCS | Mod: 26,,, | Performed by: RADIOLOGY

## 2019-04-10 PROCEDURE — 99999 PR PBB SHADOW E&M-EST. PATIENT-LVL III: ICD-10-PCS | Mod: PBBFAC,,, | Performed by: FAMILY MEDICINE

## 2019-04-10 PROCEDURE — 77063 MAMMO DIGITAL SCREENING BILAT WITH TOMOSYNTHESIS_CAD: ICD-10-PCS | Mod: 26,,, | Performed by: RADIOLOGY

## 2019-04-10 PROCEDURE — 99214 OFFICE O/P EST MOD 30 MIN: CPT | Mod: S$GLB,,, | Performed by: FAMILY MEDICINE

## 2019-04-10 PROCEDURE — 77067 SCR MAMMO BI INCL CAD: CPT | Mod: 26,,, | Performed by: RADIOLOGY

## 2019-04-10 RX ORDER — HYDROCODONE BITARTRATE AND ACETAMINOPHEN 10; 325 MG/1; MG/1
1 TABLET ORAL 4 TIMES DAILY PRN
Qty: 120 TABLET | Refills: 0 | Status: SHIPPED | OUTPATIENT
Start: 2019-04-10 | End: 2019-07-03 | Stop reason: SDUPTHER

## 2019-04-10 NOTE — PROGRESS NOTES
The patient presents today to fu arthralgia and mod severe LBP w hx DDD but also rt hip pain and glut med tear Doing PT     Past Medical History:   Diagnosis Date    Bulging disc     Fibrocystic breast      Past Surgical History:   Procedure Laterality Date    MANDIBLE FRACTURE SURGERY       Review of patient's allergies indicates:   Allergen Reactions    No known drug allergies      Current Outpatient Medications on File Prior to Visit   Medication Sig Dispense Refill    COMBIPATCH 0.05-0.14 mg/24 hr APPLY TWICE A WEEK AS DIRECTED 24 patch 0    HYDROcodone-acetaminophen (NORCO)  mg per tablet Take 1 tablet by mouth 4 (four) times daily as needed for Pain. 120 tablet 0    meloxicam (MOBIC) 15 MG tablet Take 1 tablet (15 mg total) by mouth once daily. 90 tablet 3    zolpidem (AMBIEN) 10 mg Tab TAKE 1 TABLET BY MOUTH EVERY DAY AS NEEDED 30 tablet 5    benzonatate (TESSALON) 200 MG capsule TK 1 C PO TID FOR 10 DAYS PRF COUGH  0    carbinoxamine maleate 4 mg Tab TK 1 T PO QID PRF COG  0     No current facility-administered medications on file prior to visit.      Social History     Socioeconomic History    Marital status:      Spouse name: Not on file    Number of children: Not on file    Years of education: Not on file    Highest education level: Not on file   Occupational History     Employer: [employer]   Social Needs    Financial resource strain: Not on file    Food insecurity:     Worry: Not on file     Inability: Not on file    Transportation needs:     Medical: Not on file     Non-medical: Not on file   Tobacco Use    Smoking status: Never Smoker    Smokeless tobacco: Never Used   Substance and Sexual Activity    Alcohol use: No    Drug use: No    Sexual activity: Yes     Birth control/protection: Post-menopausal   Lifestyle    Physical activity:     Days per week: Not on file     Minutes per session: Not on file    Stress: Not on file   Relationships    Social  connections:     Talks on phone: Not on file     Gets together: Not on file     Attends Scientology service: Not on file     Active member of club or organization: Not on file     Attends meetings of clubs or organizations: Not on file     Relationship status: Not on file   Other Topics Concern    Not on file   Social History Narrative    Not on file     Family History   Problem Relation Age of Onset    Hypertension Mother     Diabetes Mother     Cancer Father     Breast cancer Paternal Aunt     Colon cancer Neg Hx     Ovarian cancer Neg Hx          ROS:GENERAL: No fever, chills, fatigability or weight loss.  SKIN: No rashes, itching or changes in color or texture of skin.  HEAD: No headaches or recent head trauma.EYES: Visual acuity fine. No photophobia, ocular pain or diplopia.EARS: Denies ear pain, discharge or vertigo.NOSE: No loss of smell, no epistaxis or postnasal drip.MOUTH & THROAT: No hoarseness or change in voice. No excessive gum bleeding.NODES: Denies swollen glands.  CHEST: Denies ARROYO, cyanosis, wheezing, cough and sputum production.  CARDIOVASCULAR: Denies chest pain, PND, orthopnea or reduced exercise tolerance.  ABDOMEN: Appetite fine. No weight loss. Denies diarrhea, abdominal pain, hematemesis or blood in stool.  URINARY: No flank pain, dysuria or hematuria.  PERIPHERAL VASCULAR: No claudication or cyanosis.  MUSCULOSKELETAL: See above.  NEUROLOGIC: No history of seizures, paralysis, alteration of gait or coordination.  PE:   HEAD: Normocephalic, atraumatic.EYES: PERRL. EOMI.   EARS: TM's intact. Light reflex normal. No retraction or perforation.   NOSE: Mucosa pink. Airway clear.MOUTH & THROAT: No tonsillar enlargement. No pharyngeal erythema or exudate. No stridor.  NODES: No cervical, axillary or inguinal lymph node enlargement.  CHEST: Lungs clear to auscultation.  CARDIOVASCULAR: Normal S1, S2. No rubs, murmurs or gallops.  ABDOMEN: Bowel sounds normal. Not distended. Soft. No  tenderness or masses.  MUSCULOSKELETAL: Mod gen degen changes NEUROLOGIC: Cranial Nerves: II-XII grossly intact.  Motor: 5/5 strength major flexors/extensors.  DTR's: Knees, Ankles 2+ and equal bilaterally; downgoing toes.  Sensory: Intact to light touch distally.  Gait & Posture: Normal gait and fine motion. No cerebellar signs.     Impression: Cervical and lumbar DDD  Hip arthralgia  CD GONZALEZ  GERD  Plan: Rec diet and ex recs vit D calcium   Rev ok HC for sparing use    Continue H2 and or gaviscon-rev PPI risk low Mg renal fx   Continue meloxicam 15 but further discuss gale Salazar who suggested it would impair healing if she has a lumbar fusion

## 2019-04-18 ENCOUNTER — TELEPHONE (OUTPATIENT)
Dept: OBSTETRICS AND GYNECOLOGY | Facility: CLINIC | Age: 54
End: 2019-04-18

## 2019-04-18 NOTE — TELEPHONE ENCOUNTER
----- Message from Brigette Berry MA sent at 4/17/2019 11:28 AM CDT -----    What is the request in detail: Pt states she made an appt on 5/22 from the portal and was scheduled for her annual in June. If it's okay, she would like to be seen in May. Please call pt to further discuss and advise.     Can the clinic reply by [facility]: No     What Number to Call Back if not in [facility]: [phone]

## 2019-04-18 NOTE — TELEPHONE ENCOUNTER
----- Message from mEma Sanchez sent at 4/18/2019 10:28 AM CDT -----  Contact: Self            Name of Who is Calling:  XIMENA QUIGLEY [[id number]]      What is the request in detail: Pt states she is calling to let the clinical staff know that she can do 05/28 at 1:30 or whatever time is available. Please contact to further discuss and advise.        Can the clinic reply by [facility]: Y      What [facility]: [phone]

## 2019-04-30 LAB
25(OH)D3 SERPL-MCNC: 45.5 NG/ML
ALBUMIN: 4.3
ALP ISOS SERPL LEV INH-CCNC: 85 U/L
ALT SERPL-CCNC: 13 U/L
AST SERPL-CCNC: 21 U/L
BILIRUBIN, TOTAL: 0.6
CALCIUM SERPL-MCNC: 9.5 MG/DL
CARBON DIOXIDE, CO2: 30
CHLORIDE: 103
CHOLEST SERPL-MSCNC: 226 MG/DL (ref 0–200)
CREAT SERPL-MCNC: 0.7 MG/DL
GLUCOSE: 86
HDLC SERPL-MCNC: 85 MG/DL
LDL CHOLESTEROL DIRECT: 128 MG/DL
LDL/HDL RATIO: 1.5
POTASSIUM: 4
SODIUM: 141
TOTAL PROTEIN: 7.8 G/DL (ref 6.4–8.2)
TRIGL SERPL-MCNC: 107 MG/DL
TSH: 2.59
UREA NITROGEN (BUN): 15
VLDLC SERPL-MCNC: 21.4 MG/DL

## 2019-05-13 RX ORDER — ZOLPIDEM TARTRATE 10 MG/1
TABLET ORAL
Qty: 30 TABLET | Refills: 5 | Status: SHIPPED | OUTPATIENT
Start: 2019-05-13 | End: 2019-11-30 | Stop reason: SDUPTHER

## 2019-05-28 ENCOUNTER — OFFICE VISIT (OUTPATIENT)
Dept: OBSTETRICS AND GYNECOLOGY | Facility: CLINIC | Age: 54
End: 2019-05-28
Attending: OBSTETRICS & GYNECOLOGY
Payer: COMMERCIAL

## 2019-05-28 VITALS
WEIGHT: 130.06 LBS | SYSTOLIC BLOOD PRESSURE: 120 MMHG | HEIGHT: 67 IN | BODY MASS INDEX: 20.41 KG/M2 | DIASTOLIC BLOOD PRESSURE: 64 MMHG

## 2019-05-28 DIAGNOSIS — Z12.4 PAP SMEAR FOR CERVICAL CANCER SCREENING: ICD-10-CM

## 2019-05-28 DIAGNOSIS — Z01.419 WELL WOMAN EXAM WITH ROUTINE GYNECOLOGICAL EXAM: Primary | ICD-10-CM

## 2019-05-28 DIAGNOSIS — Z79.890 HORMONE REPLACEMENT THERAPY (HRT): ICD-10-CM

## 2019-05-28 PROCEDURE — 87624 HPV HI-RISK TYP POOLED RSLT: CPT

## 2019-05-28 PROCEDURE — 88175 CYTOPATH C/V AUTO FLUID REDO: CPT

## 2019-05-28 PROCEDURE — 99396 PREV VISIT EST AGE 40-64: CPT | Mod: S$GLB,,, | Performed by: OBSTETRICS & GYNECOLOGY

## 2019-05-28 PROCEDURE — 99396 PR PREVENTIVE VISIT,EST,40-64: ICD-10-PCS | Mod: S$GLB,,, | Performed by: OBSTETRICS & GYNECOLOGY

## 2019-05-28 NOTE — PROGRESS NOTES
Subjective:       Patient ID: Wendie Reeves is a 53 y.o. female.    Chief Complaint:  Well Woman      History of Present Illness  HPI  This 53 yr old P0 female is here for well woman and is due for pap.  She is taking ERT.  Her mammogram was negative and low T-C score.  She  Just had BMD and normal back but lots of arthritis and joint loss so false neg?  She is having surgery on back soon and osteopenia of hips bilaterally.  We discussed.  She just had labs done in general for Dr Tyler and all ok.    GYN & OB History  Patient's last menstrual period was 2014.   Date of Last Pap: 2015    OB History    Para Term  AB Living   0   0         SAB TAB Ectopic Multiple Live Births                   Review of Systems  Review of Systems   Constitutional: Negative for chills and fever.   Respiratory: Negative for shortness of breath.    Cardiovascular: Negative for chest pain.   Gastrointestinal: Negative for abdominal pain, nausea and vomiting.   Genitourinary: Negative for difficulty urinating, dyspareunia, genital sores, menstrual problem, pelvic pain, vaginal bleeding, vaginal discharge and vaginal pain.   Skin: Negative for wound.   Hematological: Negative for adenopathy.           Objective:   Physical Exam:   Constitutional: She is oriented to person, place, and time. She appears well-developed and well-nourished.    HENT:   Head: Normocephalic.    Eyes: EOM are normal.    Neck: Normal range of motion.    Cardiovascular: Normal rate.     Pulmonary/Chest: Effort normal. She exhibits no mass and no tenderness. Right breast exhibits no inverted nipple, no mass, no skin change and no tenderness. Left breast exhibits no inverted nipple, no mass, no skin change and no tenderness.        Abdominal: Soft. She exhibits no distension. There is no tenderness.     Genitourinary: Vagina normal and uterus normal. There is no rash, tenderness or lesion on the right labia. There is no rash, tenderness or lesion  on the left labia. Uterus is not tender. Cervix is normal. Right adnexum displays no mass, no tenderness and no fullness. Left adnexum displays no mass, no tenderness and no fullness. Cervix exhibits no discharge.           Musculoskeletal: Normal range of motion.       Neurological: She is alert and oriented to person, place, and time.    Skin: Skin is warm and dry.    Psychiatric: She has a normal mood and affect.          Assessment:        1. Well woman exam with routine gynecological exam    2. Hormone replacement therapy (HRT)    3. Pap smear for cervical cancer screening               Plan:      Routine follow up and continue current HRT  Will get labs  Pap every 3 yrs  Mammogram yearly

## 2019-05-31 LAB
HPV HR 12 DNA CVX QL NAA+PROBE: NEGATIVE
HPV16 AG SPEC QL: NEGATIVE
HPV18 DNA SPEC QL NAA+PROBE: NEGATIVE

## 2019-06-03 ENCOUNTER — TELEPHONE (OUTPATIENT)
Dept: OBSTETRICS AND GYNECOLOGY | Facility: CLINIC | Age: 54
End: 2019-06-03

## 2019-06-03 NOTE — TELEPHONE ENCOUNTER
----- Message from Ariana Brown sent at 6/3/2019  9:05 AM CDT -----  Contact: self  Pt needs her orders for her Estradiol levels faxed to Fax number is [phone] so she can have them drawn at her job it's easier. Make it to att to Wendie

## 2019-06-14 ENCOUNTER — TELEPHONE (OUTPATIENT)
Dept: OBSTETRICS AND GYNECOLOGY | Facility: CLINIC | Age: 54
End: 2019-06-14

## 2019-06-14 NOTE — TELEPHONE ENCOUNTER
----- Message from Karo Chan sent at 6/14/2019 12:09 PM CDT -----  Contact: pt  Name of Who is Calling: XIMENA QUIGLEY [[id number]]      What is the request in detail: pt needs lab orders faxed to job at [employer].. please advise  Fax: [phone]    Can the clinic reply by [facility]: no      What [facility]: [phone]

## 2019-07-03 ENCOUNTER — PATIENT OUTREACH (OUTPATIENT)
Dept: ADMINISTRATIVE | Facility: HOSPITAL | Age: 54
End: 2019-07-03

## 2019-07-03 ENCOUNTER — OFFICE VISIT (OUTPATIENT)
Dept: FAMILY MEDICINE | Facility: CLINIC | Age: 54
End: 2019-07-03
Payer: COMMERCIAL

## 2019-07-03 VITALS
WEIGHT: 132 LBS | DIASTOLIC BLOOD PRESSURE: 81 MMHG | HEIGHT: 67 IN | SYSTOLIC BLOOD PRESSURE: 129 MMHG | HEART RATE: 67 BPM | BODY MASS INDEX: 20.72 KG/M2 | TEMPERATURE: 98 F

## 2019-07-03 DIAGNOSIS — M25.552 PAIN OF BOTH HIP JOINTS: ICD-10-CM

## 2019-07-03 DIAGNOSIS — M48.9 CERVICAL SPINE DISEASE: ICD-10-CM

## 2019-07-03 DIAGNOSIS — G89.29 CHRONIC BILATERAL LOW BACK PAIN WITHOUT SCIATICA: Primary | ICD-10-CM

## 2019-07-03 DIAGNOSIS — M54.50 CHRONIC BILATERAL LOW BACK PAIN WITHOUT SCIATICA: Primary | ICD-10-CM

## 2019-07-03 DIAGNOSIS — M25.551 PAIN OF BOTH HIP JOINTS: ICD-10-CM

## 2019-07-03 PROCEDURE — 99999 PR PBB SHADOW E&M-EST. PATIENT-LVL III: ICD-10-PCS | Mod: PBBFAC,,, | Performed by: FAMILY MEDICINE

## 2019-07-03 PROCEDURE — 99999 PR PBB SHADOW E&M-EST. PATIENT-LVL III: CPT | Mod: PBBFAC,,, | Performed by: FAMILY MEDICINE

## 2019-07-03 PROCEDURE — 99213 OFFICE O/P EST LOW 20 MIN: CPT | Mod: S$GLB,,, | Performed by: FAMILY MEDICINE

## 2019-07-03 PROCEDURE — 99213 PR OFFICE/OUTPT VISIT, EST, LEVL III, 20-29 MIN: ICD-10-PCS | Mod: S$GLB,,, | Performed by: FAMILY MEDICINE

## 2019-07-03 RX ORDER — HYDROCODONE BITARTRATE AND ACETAMINOPHEN 10; 325 MG/1; MG/1
1 TABLET ORAL 4 TIMES DAILY PRN
Qty: 120 TABLET | Refills: 0 | Status: SHIPPED | OUTPATIENT
Start: 2019-07-06 | End: 2019-10-02 | Stop reason: SDUPTHER

## 2019-07-03 NOTE — PROGRESS NOTES
The patient presents today to fu arthralgia and mod severe LBP w hx DDD but also rt hip pain and glut med tear Doing PT wwith needling cupping and other modalities.      Past Medical History:   Diagnosis Date    Bulging disc     Degenerative disc disease, cervical     Degenerative disc disease, lumbar     Fibrocystic breast      Past Surgical History:   Procedure Laterality Date    MANDIBLE FRACTURE SURGERY       Review of patient's allergies indicates:   Allergen Reactions    No known drug allergies      Current Outpatient Medications on File Prior to Visit   Medication Sig Dispense Refill    benzonatate (TESSALON) 200 MG capsule TK 1 C PO TID FOR 10 DAYS PRF COUGH  0    carbinoxamine maleate 4 mg Tab TK 1 T PO QID PRF COG  0    COMBIPATCH 0.05-0.14 mg/24 hr APPLY TWICE A WEEK AS DIRECTED 24 patch 0    HYDROcodone-acetaminophen (NORCO)  mg per tablet Take 1 tablet by mouth 4 (four) times daily as needed for Pain. 120 tablet 0    meloxicam (MOBIC) 15 MG tablet Take 1 tablet (15 mg total) by mouth once daily. 90 tablet 3    zolpidem (AMBIEN) 10 mg Tab TAKE 1 TABLET BY MOUTH EVERY DAY AS NEEDED 30 tablet 5     No current facility-administered medications on file prior to visit.      Social History     Socioeconomic History    Marital status:      Spouse name: Not on file    Number of children: Not on file    Years of education: Not on file    Highest education level: Not on file   Occupational History     Employer: [employer]   Social Needs    Financial resource strain: Not on file    Food insecurity:     Worry: Not on file     Inability: Not on file    Transportation needs:     Medical: Not on file     Non-medical: Not on file   Tobacco Use    Smoking status: Never Smoker    Smokeless tobacco: Never Used   Substance and Sexual Activity    Alcohol use: No    Drug use: No    Sexual activity: Yes     Birth control/protection: Post-menopausal   Lifestyle    Physical  activity:     Days per week: Not on file     Minutes per session: Not on file    Stress: Not on file   Relationships    Social connections:     Talks on phone: Not on file     Gets together: Not on file     Attends Orthodoxy service: Not on file     Active member of club or organization: Not on file     Attends meetings of clubs or organizations: Not on file     Relationship status: Not on file   Other Topics Concern    Not on file   Social History Narrative    Not on file     Family History   Problem Relation Age of Onset    Hypertension Mother     Diabetes Mother     Cancer Father     Breast cancer Paternal Aunt     Colon cancer Neg Hx     Ovarian cancer Neg Hx          ROS:GENERAL: No fever, chills, fatigability or weight loss.  SKIN: No rashes, itching or changes in color or texture of skin.  HEAD: No headaches or recent head trauma.EYES: Visual acuity fine. No photophobia, ocular pain or diplopia.EARS: Denies ear pain, discharge or vertigo.NOSE: No loss of smell, no epistaxis or postnasal drip.MOUTH & THROAT: No hoarseness or change in voice. No excessive gum bleeding.NODES: Denies swollen glands.  CHEST: Denies ARROYO, cyanosis, wheezing, cough and sputum production.  CARDIOVASCULAR: Denies chest pain, PND, orthopnea or reduced exercise tolerance.  ABDOMEN: Appetite fine. No weight loss. Denies diarrhea, abdominal pain, hematemesis or blood in stool.  URINARY: No flank pain, dysuria or hematuria.  PERIPHERAL VASCULAR: No claudication or cyanosis.  MUSCULOSKELETAL: See above.  NEUROLOGIC: No history of seizures, paralysis, alteration of gait or coordination.  PE:   HEAD: Normocephalic, atraumatic.EYES: PERRL. EOMI.   EARS: TM's intact. Light reflex normal. No retraction or perforation.   NOSE: Mucosa pink. Airway clear.MOUTH & THROAT: No tonsillar enlargement. No pharyngeal erythema or exudate. No stridor.  NODES: No cervical, axillary or inguinal lymph node enlargement.  CHEST: Lungs clear to  auscultation.  CARDIOVASCULAR: Normal S1, S2. No rubs, murmurs or gallops.  ABDOMEN: Bowel sounds normal. Not distended. Soft. No tenderness or masses.  MUSCULOSKELETAL: Mod gen degen changes NEUROLOGIC: Cranial Nerves: II-XII grossly intact.  Motor: 5/5 strength major flexors/extensors.  DTR's: Knees, Ankles 2+ and equal bilaterally; downgoing toes.  Sensory: Intact to light touch distally.  Gait & Posture: Normal gait and fine motion. No cerebellar signs.     Impression: Cervical and lumbar DDD  Hip arthralgia  CD GONZALEZ  GERD  Plan: Rev diet and ex recs   reviewed no concerning finding  Rev ok HC for sparing use  ~#120 q 3m  Continue H2 rev PPI risk low Mg renal fx   Continue meloxicam 15 remembering it may impair healing if she has a lumbar fusion

## 2019-07-09 ENCOUNTER — PATIENT OUTREACH (OUTPATIENT)
Dept: ADMINISTRATIVE | Facility: HOSPITAL | Age: 54
End: 2019-07-09

## 2019-08-11 ENCOUNTER — PATIENT MESSAGE (OUTPATIENT)
Dept: OBSTETRICS AND GYNECOLOGY | Facility: CLINIC | Age: 54
End: 2019-08-11

## 2019-08-11 DIAGNOSIS — Z78.0 MENOPAUSE: Primary | ICD-10-CM

## 2019-08-14 ENCOUNTER — PATIENT MESSAGE (OUTPATIENT)
Dept: OBSTETRICS AND GYNECOLOGY | Facility: CLINIC | Age: 54
End: 2019-08-14

## 2019-09-18 ENCOUNTER — PATIENT OUTREACH (OUTPATIENT)
Dept: ADMINISTRATIVE | Facility: HOSPITAL | Age: 54
End: 2019-09-18

## 2019-10-02 ENCOUNTER — OFFICE VISIT (OUTPATIENT)
Dept: FAMILY MEDICINE | Facility: CLINIC | Age: 54
End: 2019-10-02
Payer: COMMERCIAL

## 2019-10-02 VITALS
WEIGHT: 135 LBS | HEART RATE: 68 BPM | HEIGHT: 67 IN | BODY MASS INDEX: 21.19 KG/M2 | TEMPERATURE: 98 F | SYSTOLIC BLOOD PRESSURE: 125 MMHG | DIASTOLIC BLOOD PRESSURE: 76 MMHG

## 2019-10-02 DIAGNOSIS — G89.29 CHRONIC BILATERAL LOW BACK PAIN WITHOUT SCIATICA: Primary | ICD-10-CM

## 2019-10-02 DIAGNOSIS — M54.50 CHRONIC BILATERAL LOW BACK PAIN WITHOUT SCIATICA: Primary | ICD-10-CM

## 2019-10-02 PROCEDURE — 99213 OFFICE O/P EST LOW 20 MIN: CPT | Mod: S$GLB,,, | Performed by: FAMILY MEDICINE

## 2019-10-02 PROCEDURE — 99999 PR PBB SHADOW E&M-EST. PATIENT-LVL III: CPT | Mod: PBBFAC,,, | Performed by: FAMILY MEDICINE

## 2019-10-02 PROCEDURE — 99213 PR OFFICE/OUTPT VISIT, EST, LEVL III, 20-29 MIN: ICD-10-PCS | Mod: S$GLB,,, | Performed by: FAMILY MEDICINE

## 2019-10-02 PROCEDURE — 99999 PR PBB SHADOW E&M-EST. PATIENT-LVL III: ICD-10-PCS | Mod: PBBFAC,,, | Performed by: FAMILY MEDICINE

## 2019-10-02 RX ORDER — HYDROCODONE BITARTRATE AND ACETAMINOPHEN 10; 325 MG/1; MG/1
1 TABLET ORAL 4 TIMES DAILY PRN
Qty: 120 TABLET | Refills: 0 | Status: SHIPPED | OUTPATIENT
Start: 2019-10-02 | End: 2020-01-02 | Stop reason: SDUPTHER

## 2019-10-02 RX ORDER — ALBUTEROL SULFATE 90 UG/1
2 AEROSOL, METERED RESPIRATORY (INHALATION) EVERY 6 HOURS PRN
Qty: 18 G | Refills: 11 | Status: SHIPPED | OUTPATIENT
Start: 2019-10-02 | End: 2020-12-02

## 2019-10-02 RX ORDER — CHOLECALCIFEROL (VITAMIN D3) 125 MCG
CAPSULE ORAL
COMMUNITY
End: 2020-12-02

## 2019-10-02 NOTE — PROGRESS NOTES
The patient presents today to fu arthralgia and mod severe LBP w hx DDD but also rt hip pain and glut med tear Doing PT wwith needling cupping and other modalities.      Past Medical History:   Diagnosis Date    Bulging disc     Degenerative disc disease, cervical     Degenerative disc disease, lumbar     Fibrocystic breast      Past Surgical History:   Procedure Laterality Date    MANDIBLE FRACTURE SURGERY       Review of patient's allergies indicates:   Allergen Reactions    No known drug allergies      Current Outpatient Medications on File Prior to Visit   Medication Sig Dispense Refill    acetaminophen/dp-hydramine (EXCEDRIN PM ORAL) Excedrin   PRN      estradiol-norethindrone (COMBIPATCH) 0.05-0.14 mg/24 hr APPLY TWICE A WEEK AS DIRECTED 24 patch 3    HYDROcodone-acetaminophen (NORCO)  mg per tablet Take 1 tablet by mouth 4 (four) times daily as needed for Pain. 120 tablet 0    meloxicam (MOBIC) 15 MG tablet Take 1 tablet (15 mg total) by mouth once daily. 90 tablet 3    naproxen sodium (ALEVE) 220 mg Cap Aleve   DAILY      zolpidem (AMBIEN) 10 mg Tab TAKE 1 TABLET BY MOUTH EVERY DAY AS NEEDED 30 tablet 5    [DISCONTINUED] carbinoxamine maleate 4 mg Tab TK 1 T PO QID PRF COG  0     No current facility-administered medications on file prior to visit.      Social History     Socioeconomic History    Marital status:      Spouse name: Not on file    Number of children: Not on file    Years of education: Not on file    Highest education level: Not on file   Occupational History     Employer: [employer]   Social Needs    Financial resource strain: Not on file    Food insecurity:     Worry: Not on file     Inability: Not on file    Transportation needs:     Medical: Not on file     Non-medical: Not on file   Tobacco Use    Smoking status: Never Smoker    Smokeless tobacco: Never Used   Substance and Sexual Activity    Alcohol use: No    Drug use: No    Sexual activity:  Yes     Birth control/protection: Post-menopausal   Lifestyle    Physical activity:     Days per week: Not on file     Minutes per session: Not on file    Stress: Not on file   Relationships    Social connections:     Talks on phone: Not on file     Gets together: Not on file     Attends Yazidism service: Not on file     Active member of club or organization: Not on file     Attends meetings of clubs or organizations: Not on file     Relationship status: Not on file   Other Topics Concern    Not on file   Social History Narrative    Not on file     Family History   Problem Relation Age of Onset    Hypertension Mother     Diabetes Mother     Cancer Father     Breast cancer Paternal Aunt     Colon cancer Neg Hx     Ovarian cancer Neg Hx          ROS:GENERAL: No fever, chills, fatigability or weight loss.  SKIN: No rashes, itching or changes in color or texture of skin.  HEAD: No headaches or recent head trauma.EYES: Visual acuity fine. No photophobia, ocular pain or diplopia.EARS: Denies ear pain, discharge or vertigo.NOSE: No loss of smell, no epistaxis or postnasal drip.MOUTH & THROAT: No hoarseness or change in voice. No excessive gum bleeding.NODES: Denies swollen glands.  CHEST: Denies ARROYO, cyanosis, wheezing, cough and sputum production.  CARDIOVASCULAR: Denies chest pain, PND, orthopnea or reduced exercise tolerance.  ABDOMEN: Appetite fine. No weight loss. Denies diarrhea, abdominal pain, hematemesis or blood in stool.  URINARY: No flank pain, dysuria or hematuria.  PERIPHERAL VASCULAR: No claudication or cyanosis.  MUSCULOSKELETAL: See above.  NEUROLOGIC: No history of seizures, paralysis, alteration of gait or coordination.  PE:   HEAD: Normocephalic, atraumatic.EYES: PERRL. EOMI.   EARS: TM's intact. Light reflex normal. No retraction or perforation.   NOSE: Mucosa pink. Airway clear.MOUTH & THROAT: No tonsillar enlargement. No pharyngeal erythema or exudate. No stridor.  NODES: No cervical,  axillary or inguinal lymph node enlargement.  CHEST: Lungs clear to auscultation.  CARDIOVASCULAR: Normal S1, S2. No rubs, murmurs or gallops.  ABDOMEN: Bowel sounds normal. Not distended. Soft. No tenderness or masses.  MUSCULOSKELETAL: Mod gen degen changes NEUROLOGIC: Cranial Nerves: II-XII grossly intact.  Motor: 5/5 strength major flexors/extensors.  DTR's: Knees, Ankles 2+ and equal bilaterally; downgoing toes.  Sensory: Intact to light touch distally.  Gait & Posture: Normal gait and fine motion. No cerebellar signs.     Impression: Cervical and lumbar DDD  Hip arthralgia  CD GONZALEZ  GERD  Plan: Rev diet and ex recs   reviewed no concerning finding  Rev ok HC for sparing use  ~#120 q 3m  Continue H2 rev PPI risk low Mg renal fx   Continue meloxicam 15

## 2019-10-25 RX ORDER — MELOXICAM 15 MG/1
TABLET ORAL
Qty: 90 TABLET | Refills: 4 | Status: SHIPPED | OUTPATIENT
Start: 2019-10-25 | End: 2021-02-15

## 2019-12-01 RX ORDER — ZOLPIDEM TARTRATE 10 MG/1
TABLET ORAL
Qty: 30 TABLET | Refills: 5 | Status: SHIPPED | OUTPATIENT
Start: 2019-12-01 | End: 2020-06-11

## 2020-01-02 ENCOUNTER — OFFICE VISIT (OUTPATIENT)
Dept: FAMILY MEDICINE | Facility: CLINIC | Age: 55
End: 2020-01-02
Payer: COMMERCIAL

## 2020-01-02 VITALS
SYSTOLIC BLOOD PRESSURE: 126 MMHG | HEIGHT: 67 IN | HEART RATE: 67 BPM | WEIGHT: 138 LBS | BODY MASS INDEX: 21.66 KG/M2 | DIASTOLIC BLOOD PRESSURE: 76 MMHG | TEMPERATURE: 98 F

## 2020-01-02 DIAGNOSIS — M25.552 PAIN OF BOTH HIP JOINTS: ICD-10-CM

## 2020-01-02 DIAGNOSIS — M25.551 PAIN OF BOTH HIP JOINTS: ICD-10-CM

## 2020-01-02 DIAGNOSIS — M54.50 CHRONIC BILATERAL LOW BACK PAIN WITHOUT SCIATICA: ICD-10-CM

## 2020-01-02 DIAGNOSIS — G89.29 CHRONIC BILATERAL LOW BACK PAIN WITHOUT SCIATICA: ICD-10-CM

## 2020-01-02 DIAGNOSIS — M48.9 CERVICAL SPINE DISEASE: Primary | ICD-10-CM

## 2020-01-02 PROCEDURE — 99213 PR OFFICE/OUTPT VISIT, EST, LEVL III, 20-29 MIN: ICD-10-PCS | Mod: S$GLB,,, | Performed by: FAMILY MEDICINE

## 2020-01-02 PROCEDURE — 99213 OFFICE O/P EST LOW 20 MIN: CPT | Mod: S$GLB,,, | Performed by: FAMILY MEDICINE

## 2020-01-02 PROCEDURE — 99999 PR PBB SHADOW E&M-EST. PATIENT-LVL III: ICD-10-PCS | Mod: PBBFAC,,, | Performed by: FAMILY MEDICINE

## 2020-01-02 PROCEDURE — 99999 PR PBB SHADOW E&M-EST. PATIENT-LVL III: CPT | Mod: PBBFAC,,, | Performed by: FAMILY MEDICINE

## 2020-01-02 RX ORDER — LEVALBUTEROL TARTRATE 45 UG/1
1-2 AEROSOL, METERED ORAL EVERY 4 HOURS PRN
Qty: 15 G | Refills: 11 | Status: SHIPPED | OUTPATIENT
Start: 2020-01-02 | End: 2021-08-05

## 2020-01-02 RX ORDER — HYDROCODONE BITARTRATE AND ACETAMINOPHEN 10; 325 MG/1; MG/1
1 TABLET ORAL 4 TIMES DAILY PRN
Qty: 120 TABLET | Refills: 0 | Status: SHIPPED | OUTPATIENT
Start: 2020-01-02 | End: 2020-03-31 | Stop reason: SDUPTHER

## 2020-01-02 NOTE — PROGRESS NOTES
The patient presents today to fu arthralgia and mod severe LBP w hx DDD     Past Medical History:   Diagnosis Date    Bulging disc     Degenerative disc disease, cervical     Degenerative disc disease, lumbar     Fibrocystic breast      Past Surgical History:   Procedure Laterality Date    MANDIBLE FRACTURE SURGERY       Review of patient's allergies indicates:   Allergen Reactions    No known drug allergies      Current Outpatient Medications on File Prior to Visit   Medication Sig Dispense Refill    acetaminophen/dp-hydramine (EXCEDRIN PM ORAL) Excedrin   PRN      albuterol (VENTOLIN HFA) 90 mcg/actuation inhaler Inhale 2 puffs into the lungs every 6 (six) hours as needed for Wheezing. Rescue 18 g 11    estradiol-norethindrone (COMBIPATCH) 0.05-0.14 mg/24 hr APPLY TWICE A WEEK AS DIRECTED 24 patch 3    HYDROcodone-acetaminophen (NORCO)  mg per tablet Take 1 tablet by mouth 4 (four) times daily as needed for Pain. 120 tablet 0    meloxicam (MOBIC) 15 MG tablet TAKE 1 TABLET(15 MG) BY MOUTH EVERY DAY 90 tablet 4    naproxen sodium (ALEVE) 220 mg Cap Aleve   DAILY      zolpidem (AMBIEN) 10 mg Tab TAKE 1 TABLET BY MOUTH EVERY DAY AS NEEDED 30 tablet 5     No current facility-administered medications on file prior to visit.      Social History     Socioeconomic History    Marital status:      Spouse name: Not on file    Number of children: Not on file    Years of education: Not on file    Highest education level: Not on file   Occupational History     Employer: [employer]   Social Needs    Financial resource strain: Not on file    Food insecurity:     Worry: Not on file     Inability: Not on file    Transportation needs:     Medical: Not on file     Non-medical: Not on file   Tobacco Use    Smoking status: Never Smoker    Smokeless tobacco: Never Used   Substance and Sexual Activity    Alcohol use: No    Drug use: No    Sexual activity: Yes     Birth control/protection:  Post-menopausal   Lifestyle    Physical activity:     Days per week: Not on file     Minutes per session: Not on file    Stress: Not on file   Relationships    Social connections:     Talks on phone: Not on file     Gets together: Not on file     Attends Protestant service: Not on file     Active member of club or organization: Not on file     Attends meetings of clubs or organizations: Not on file     Relationship status: Not on file   Other Topics Concern    Not on file   Social History Narrative    Not on file     Family History   Problem Relation Age of Onset    Hypertension Mother     Diabetes Mother     Cancer Father     Breast cancer Paternal Aunt     Colon cancer Neg Hx     Ovarian cancer Neg Hx          ROS:GENERAL: No fever, chills, fatigability or weight loss.  SKIN: No rashes, itching or changes in color or texture of skin.  HEAD: No headaches or recent head trauma.EYES: Visual acuity fine. No photophobia, ocular pain or diplopia.EARS: Denies ear pain, discharge or vertigo.NOSE: No loss of smell, no epistaxis or postnasal drip.MOUTH & THROAT: No hoarseness or change in voice. No excessive gum bleeding.NODES: Denies swollen glands.  CHEST: Denies ARROYO, cyanosis, wheezing, cough and sputum production.  CARDIOVASCULAR: Denies chest pain, PND, orthopnea or reduced exercise tolerance.  ABDOMEN: Appetite fine. No weight loss. Denies diarrhea, abdominal pain, hematemesis or blood in stool.  URINARY: No flank pain, dysuria or hematuria.  PERIPHERAL VASCULAR: No claudication or cyanosis.  MUSCULOSKELETAL: See above.  NEUROLOGIC: No history of seizures, paralysis, alteration of gait or coordination.  PE:   HEAD: Normocephalic, atraumatic.EYES: PERRL. EOMI.   EARS: TM's intact. Light reflex normal. No retraction or perforation.   NOSE: Mucosa pink. Airway clear.MOUTH & THROAT: No tonsillar enlargement. No pharyngeal erythema or exudate. No stridor.  NODES: No cervical, axillary or inguinal lymph node  enlargement.  CHEST: Lungs clear to auscultation.  CARDIOVASCULAR: Normal S1, S2. No rubs, murmurs or gallops.  ABDOMEN: Bowel sounds normal. Not distended. Soft. No tenderness or masses.  MUSCULOSKELETAL: Mod gen degen changes NEUROLOGIC: Cranial Nerves: II-XII grossly intact.  Motor: 5/5 strength major flexors/extensors.  DTR's: Knees, Ankles 2+ and equal bilaterally; downgoing toes.  Sensory: Intact to light touch distally.  Gait & Posture: Normal gait and fine motion. No cerebellar signs.     Impression: Cervical and lumbar DDD  Hip arthralgia  CD GONZALEZ  GERD  Plan: Rev diet and ex recs   reviewed no concerning finding  Rev ok HC for sparing use  ~#120 q 3m  Continue H2 rev PPI risk low Mg renal fx   Continue meloxicam 15

## 2020-02-12 ENCOUNTER — TELEPHONE (OUTPATIENT)
Dept: FAMILY MEDICINE | Facility: CLINIC | Age: 55
End: 2020-02-12

## 2020-02-12 RX ORDER — TRAMADOL HYDROCHLORIDE 50 MG/1
50 TABLET ORAL EVERY 6 HOURS
Qty: 120 TABLET | Refills: 0 | Status: SHIPPED | OUTPATIENT
Start: 2020-02-12 | End: 2020-12-02

## 2020-02-12 NOTE — TELEPHONE ENCOUNTER
Pt went to see Dr. Austin and she told him she wanted to try something less then the norco, he gave tramadol 50mg #40 q 6hrs PRN. She has done good on it and would like to continue. Asking if you can prescribe this, if so do you want her to come in again? Next visit is in april

## 2020-02-12 NOTE — TELEPHONE ENCOUNTER
----- Message from Mellisa Cox sent at 2/12/2020  3:59 PM CST -----  Contact: Pt   Pt is calling regarding requesitng to have nurse call pt back. Pt states that call is in reference to a medication change and would like to speak to nurse. .[phone] (home)         .Thank You  Mellisa Cox

## 2020-03-31 ENCOUNTER — PATIENT MESSAGE (OUTPATIENT)
Dept: FAMILY MEDICINE | Facility: CLINIC | Age: 55
End: 2020-03-31

## 2020-04-01 ENCOUNTER — OFFICE VISIT (OUTPATIENT)
Dept: FAMILY MEDICINE | Facility: CLINIC | Age: 55
End: 2020-04-01
Payer: COMMERCIAL

## 2020-04-01 VITALS
DIASTOLIC BLOOD PRESSURE: 88 MMHG | TEMPERATURE: 98 F | WEIGHT: 138.19 LBS | SYSTOLIC BLOOD PRESSURE: 136 MMHG | HEIGHT: 67 IN | HEART RATE: 86 BPM | BODY MASS INDEX: 21.69 KG/M2

## 2020-04-01 DIAGNOSIS — M54.50 CHRONIC BILATERAL LOW BACK PAIN WITHOUT SCIATICA: ICD-10-CM

## 2020-04-01 DIAGNOSIS — M48.9 CERVICAL SPINE DISEASE: Primary | ICD-10-CM

## 2020-04-01 DIAGNOSIS — G89.29 CHRONIC BILATERAL LOW BACK PAIN WITHOUT SCIATICA: ICD-10-CM

## 2020-04-01 PROCEDURE — 99999 PR PBB SHADOW E&M-EST. PATIENT-LVL III: ICD-10-PCS | Mod: PBBFAC,,, | Performed by: FAMILY MEDICINE

## 2020-04-01 PROCEDURE — 99999 PR PBB SHADOW E&M-EST. PATIENT-LVL III: CPT | Mod: PBBFAC,,, | Performed by: FAMILY MEDICINE

## 2020-04-01 PROCEDURE — 99213 PR OFFICE/OUTPT VISIT, EST, LEVL III, 20-29 MIN: ICD-10-PCS | Mod: S$GLB,,, | Performed by: FAMILY MEDICINE

## 2020-04-01 PROCEDURE — 99213 OFFICE O/P EST LOW 20 MIN: CPT | Mod: S$GLB,,, | Performed by: FAMILY MEDICINE

## 2020-04-01 RX ORDER — HYDROCODONE BITARTRATE AND ACETAMINOPHEN 10; 325 MG/1; MG/1
1 TABLET ORAL 4 TIMES DAILY PRN
Qty: 120 TABLET | Refills: 0 | Status: SHIPPED | OUTPATIENT
Start: 2020-04-01 | End: 2020-07-01 | Stop reason: SDUPTHER

## 2020-04-01 RX ORDER — ACYCLOVIR 400 MG/1
400 TABLET ORAL 4 TIMES DAILY
Qty: 40 TABLET | Refills: 1 | Status: ON HOLD | OUTPATIENT
Start: 2020-04-01 | End: 2024-01-01 | Stop reason: HOSPADM

## 2020-04-01 RX ORDER — METAXALONE 400 MG/1
400 TABLET ORAL 3 TIMES DAILY
Qty: 90 TABLET | Refills: 3 | Status: SHIPPED | OUTPATIENT
Start: 2020-04-01 | End: 2020-04-11

## 2020-04-01 RX ORDER — CYCLOBENZAPRINE HCL 10 MG
TABLET ORAL
COMMUNITY
Start: 2020-01-17 | End: 2020-12-02

## 2020-04-01 NOTE — PROGRESS NOTES
The patient presents today to fu arthralgia and mod severe LBP w hx DDD     Past Medical History:   Diagnosis Date    Bulging disc     Degenerative disc disease, cervical     Degenerative disc disease, lumbar     Fibrocystic breast      Past Surgical History:   Procedure Laterality Date    MANDIBLE FRACTURE SURGERY       Review of patient's allergies indicates:   Allergen Reactions    No known drug allergies      Current Outpatient Medications on File Prior to Visit   Medication Sig Dispense Refill    acetaminophen/dp-hydramine (EXCEDRIN PM ORAL) Excedrin   PRN      albuterol (VENTOLIN HFA) 90 mcg/actuation inhaler Inhale 2 puffs into the lungs every 6 (six) hours as needed for Wheezing. Rescue 18 g 11    estradiol-norethindrone (COMBIPATCH) 0.05-0.14 mg/24 hr APPLY TWICE A WEEK AS DIRECTED 24 patch 3    HYDROcodone-acetaminophen (NORCO)  mg per tablet Take 1 tablet by mouth 4 (four) times daily as needed for Pain. 120 tablet 0    levalbuterol (XOPENEX HFA) 45 mcg/actuation inhaler Inhale 1-2 puffs into the lungs every 4 (four) hours as needed for Wheezing. Rescue 15 g 11    meloxicam (MOBIC) 15 MG tablet TAKE 1 TABLET(15 MG) BY MOUTH EVERY DAY 90 tablet 4    naproxen sodium (ALEVE) 220 mg Cap Aleve   DAILY      traMADol (ULTRAM) 50 mg tablet Take 1 tablet (50 mg total) by mouth every 6 (six) hours. 120 tablet 0    zolpidem (AMBIEN) 10 mg Tab TAKE 1 TABLET BY MOUTH EVERY DAY AS NEEDED 30 tablet 5    cyclobenzaprine (FLEXERIL) 10 MG tablet        No current facility-administered medications on file prior to visit.      Social History     Socioeconomic History    Marital status:      Spouse name: Not on file    Number of children: Not on file    Years of education: Not on file    Highest education level: Not on file   Occupational History     Employer: [employer], [place]   Social Needs    Financial resource strain: Not on file    Food insecurity:     Worry: Not on file      Inability: Not on file    Transportation needs:     Medical: Not on file     Non-medical: Not on file   Tobacco Use    Smoking status: Never Smoker    Smokeless tobacco: Never Used   Substance and Sexual Activity    Alcohol use: No    Drug use: No    Sexual activity: Yes     Birth control/protection: Post-menopausal   Lifestyle    Physical activity:     Days per week: Not on file     Minutes per session: Not on file    Stress: Not on file   Relationships    Social connections:     Talks on phone: Not on file     Gets together: Not on file     Attends Cheondoism service: Not on file     Active member of club or organization: Not on file     Attends meetings of clubs or organizations: Not on file     Relationship status: Not on file   Other Topics Concern    Not on file   Social History Narrative    Not on file     Family History   Problem Relation Age of Onset    Hypertension Mother     Diabetes Mother     Cancer Father     Breast cancer Paternal Aunt     Colon cancer Neg Hx     Ovarian cancer Neg Hx          ROS:GENERAL: No fever, chills, fatigability or weight loss.  SKIN: No rashes, itching or changes in color or texture of skin.  HEAD: No headaches or recent head trauma.EYES: Visual acuity fine. No photophobia, ocular pain or diplopia.EARS: Denies ear pain, discharge or vertigo.NOSE: No loss of smell, no epistaxis or postnasal drip.MOUTH & THROAT: No hoarseness or change in voice. No excessive gum bleeding.NODES: Denies swollen glands.  CHEST: Denies ARROYO, cyanosis, wheezing, cough and sputum production.  CARDIOVASCULAR: Denies chest pain, PND, orthopnea or reduced exercise tolerance.  ABDOMEN: Appetite fine. No weight loss. Denies diarrhea, abdominal pain, hematemesis or blood in stool.  URINARY: No flank pain, dysuria or hematuria.  PERIPHERAL VASCULAR: No claudication or cyanosis.  MUSCULOSKELETAL: See above.  NEUROLOGIC: No history of seizures, paralysis, alteration of gait or  coordination.  PE:   HEAD: Normocephalic, atraumatic.EYES: PERRL. EOMI.   EARS: TM's intact. Light reflex normal. No retraction or perforation.   NOSE: Mucosa pink. Airway clear.MOUTH & THROAT: No tonsillar enlargement. No pharyngeal erythema or exudate. No stridor.  NODES: No cervical, axillary or inguinal lymph node enlargement.  CHEST: Lungs clear to auscultation.  CARDIOVASCULAR: Normal S1, S2. No rubs, murmurs or gallops.  ABDOMEN: Bowel sounds normal. Not distended. Soft. No tenderness or masses.  MUSCULOSKELETAL: Mod gen degen changes NEUROLOGIC: Cranial Nerves: II-XII grossly intact.  Motor: 5/5 strength major flexors/extensors.  DTR's: Knees, Ankles 2+ and equal bilaterally; downgoing toes.  Sensory: Intact to light touch distally.  Gait & Posture: Normal gait and fine motion. No cerebellar signs.     Impression: Cervical and lumbar DDD  Hip arthralgia  CD GONZALEZ  GERD  Plan: Rev diet and ex recs   reviewed no concerning finding  Rev ok HC for sparing use  ~#120 q 3m  Change meloxicam 15 to 7.5 bid -will break   Ch flexaril to tizanidine

## 2020-05-18 ENCOUNTER — TELEPHONE (OUTPATIENT)
Dept: OBSTETRICS AND GYNECOLOGY | Facility: CLINIC | Age: 55
End: 2020-05-18

## 2020-05-18 NOTE — TELEPHONE ENCOUNTER
----- Message from Brian Simpson sent at 5/18/2020 12:20 PM CDT -----  Contact: [phone]/self  Patient requesting to be seen after 5-28-20 for an annual visit  Please advise

## 2020-05-27 ENCOUNTER — PATIENT OUTREACH (OUTPATIENT)
Dept: ADMINISTRATIVE | Facility: OTHER | Age: 55
End: 2020-05-27

## 2020-05-27 DIAGNOSIS — Z12.31 ENCOUNTER FOR SCREENING MAMMOGRAM FOR MALIGNANT NEOPLASM OF BREAST: Primary | ICD-10-CM

## 2020-05-28 ENCOUNTER — OFFICE VISIT (OUTPATIENT)
Dept: OBSTETRICS AND GYNECOLOGY | Facility: CLINIC | Age: 55
End: 2020-05-28
Attending: OBSTETRICS & GYNECOLOGY
Payer: COMMERCIAL

## 2020-05-28 VITALS — BODY MASS INDEX: 21.1 KG/M2 | WEIGHT: 134.69 LBS

## 2020-05-28 DIAGNOSIS — N36.8 URETHRAL IRRITATION: ICD-10-CM

## 2020-05-28 DIAGNOSIS — Z78.0 MENOPAUSE: ICD-10-CM

## 2020-05-28 DIAGNOSIS — Z01.419 WELL WOMAN EXAM WITH ROUTINE GYNECOLOGICAL EXAM: Primary | ICD-10-CM

## 2020-05-28 DIAGNOSIS — N39.0 URINARY TRACT INFECTION WITHOUT HEMATURIA, SITE UNSPECIFIED: ICD-10-CM

## 2020-05-28 PROCEDURE — 99396 PR PREVENTIVE VISIT,EST,40-64: ICD-10-PCS | Mod: S$GLB,,, | Performed by: OBSTETRICS & GYNECOLOGY

## 2020-05-28 PROCEDURE — 99396 PREV VISIT EST AGE 40-64: CPT | Mod: S$GLB,,, | Performed by: OBSTETRICS & GYNECOLOGY

## 2020-05-28 RX ORDER — CLOBETASOL PROPIONATE 0.05 MG/G
GEL TOPICAL 2 TIMES DAILY
Qty: 15 G | Refills: 1 | Status: SHIPPED | OUTPATIENT
Start: 2020-05-28 | End: 2020-12-02

## 2020-05-28 NOTE — PROGRESS NOTES
"Subjective:       Patient ID: Wendie Reeves is a 54 y.o. female.    Chief Complaint:  Well Woman      History of Present Illness  HPI  This 54 yr old P0 female is here for WWE.  She had normal pap and neg HPV last yr .  She is due for her mammogram.  She is using the combipatch. She is doing well with this and no symptoms but for no sexual drive.  She used one injection of testosterone in the past but did nothing.  Her  has DM so being sexually interested all of the time is not issue but would like to be interested when he takes med for this.  We discussed the injectable Vyleesi and the off label viagra cream  She was having some urgency and feeling full bladder all of the time.  She took some cipro that she had for 5 days and was finished with this about 2 weeks ago.  and now just having uretral opening "feeling/burning" but not all of the time.  She is also having BP issues up to 160/110.  She thinks this is work and worring about her mother.  She seems fine when not working.  Her internist Dr Bob is working with her on this.  She is not leaking urine or having pain but just this irritation at the urethra    GYN & OB History  Patient's last menstrual period was 2014.   Date of Last Pap: 2019    OB History    Para Term  AB Living   0   0         SAB TAB Ectopic Multiple Live Births                   Review of Systems  Review of Systems   Constitutional: Negative for chills and fever.   Respiratory: Negative for shortness of breath.    Cardiovascular: Negative for chest pain.   Gastrointestinal: Negative for abdominal pain, nausea and vomiting.   Genitourinary: Negative for difficulty urinating, dyspareunia, genital sores, menstrual problem, pelvic pain, vaginal bleeding, vaginal discharge and vaginal pain.   Skin: Negative for wound.   Hematological: Negative for adenopathy.           Objective:   Physical Exam:   Constitutional: She is oriented to person, place, and time. She " appears well-developed and well-nourished.    HENT:   Head: Normocephalic.    Eyes: EOM are normal.    Neck: Normal range of motion.    Cardiovascular: Normal rate.     Pulmonary/Chest: Effort normal. She exhibits no mass and no tenderness. Right breast exhibits no inverted nipple, no mass, no skin change and no tenderness. Left breast exhibits no inverted nipple, no mass, no skin change and no tenderness.        Abdominal: Soft. She exhibits no distension. There is no tenderness.     Genitourinary: Vagina normal and uterus normal. There is no rash, tenderness or lesion on the right labia. There is no rash, tenderness or lesion on the left labia. Uterus is not tender. Cervix is normal. Right adnexum displays no mass, no tenderness and no fullness. Left adnexum displays no mass, no tenderness and no fullness. Cervix exhibits no discharge.           Musculoskeletal: Normal range of motion.       Neurological: She is alert and oriented to person, place, and time.    Skin: Skin is warm and dry.    Psychiatric: She has a normal mood and affect.          Assessment:        1. Well woman exam with routine gynecological exam    2. Menopause    3. Urinary tract infection without hematuria, site unspecified    4. Urethral irritation               Plan:      Will need to talk to the rep about vyleesi about possible interaction with seratonin as pt cannot take SSRIs.  Makes her hyper and out of control.

## 2020-06-04 ENCOUNTER — TELEPHONE (OUTPATIENT)
Dept: OBSTETRICS AND GYNECOLOGY | Facility: CLINIC | Age: 55
End: 2020-06-04

## 2020-06-04 NOTE — TELEPHONE ENCOUNTER
----- Message from Cinthia Romo sent at 6/4/2020 12:27 PM CDT -----  Contact: XIMENA QUIGLEY   Name of Who is Calling: XIMENA QUIGLEY       What is the request in detail: Patient is requesting a call back for the results of her urine test and also she wanted to see was there a referral for urogynecology        Can the clinic reply by [facility]: no      What Number to Call Back if not in [facility]: [phone]

## 2020-07-01 ENCOUNTER — OFFICE VISIT (OUTPATIENT)
Dept: FAMILY MEDICINE | Facility: CLINIC | Age: 55
End: 2020-07-01
Payer: COMMERCIAL

## 2020-07-01 VITALS
DIASTOLIC BLOOD PRESSURE: 77 MMHG | SYSTOLIC BLOOD PRESSURE: 118 MMHG | WEIGHT: 132.38 LBS | HEIGHT: 67 IN | BODY MASS INDEX: 20.78 KG/M2 | HEART RATE: 81 BPM | TEMPERATURE: 98 F

## 2020-07-01 DIAGNOSIS — M54.50 CHRONIC BILATERAL LOW BACK PAIN WITHOUT SCIATICA: ICD-10-CM

## 2020-07-01 DIAGNOSIS — M48.9 CERVICAL SPINE DISEASE: Primary | ICD-10-CM

## 2020-07-01 DIAGNOSIS — G89.29 CHRONIC BILATERAL LOW BACK PAIN WITHOUT SCIATICA: ICD-10-CM

## 2020-07-01 PROCEDURE — 99999 PR PBB SHADOW E&M-EST. PATIENT-LVL III: CPT | Mod: PBBFAC,,, | Performed by: FAMILY MEDICINE

## 2020-07-01 PROCEDURE — 99999 PR PBB SHADOW E&M-EST. PATIENT-LVL III: ICD-10-PCS | Mod: PBBFAC,,, | Performed by: FAMILY MEDICINE

## 2020-07-01 PROCEDURE — 99213 OFFICE O/P EST LOW 20 MIN: CPT | Mod: S$GLB,,, | Performed by: FAMILY MEDICINE

## 2020-07-01 PROCEDURE — 99213 PR OFFICE/OUTPT VISIT, EST, LEVL III, 20-29 MIN: ICD-10-PCS | Mod: S$GLB,,, | Performed by: FAMILY MEDICINE

## 2020-07-01 RX ORDER — ATENOLOL 25 MG/1
25 TABLET ORAL DAILY
Qty: 90 TABLET | Refills: 3 | Status: SHIPPED | OUTPATIENT
Start: 2020-07-01 | End: 2021-06-15

## 2020-07-01 RX ORDER — HYDROCODONE BITARTRATE AND ACETAMINOPHEN 10; 325 MG/1; MG/1
1 TABLET ORAL 4 TIMES DAILY PRN
Qty: 120 TABLET | Refills: 0 | Status: SHIPPED | OUTPATIENT
Start: 2020-07-01 | End: 2020-09-28 | Stop reason: SDUPTHER

## 2020-07-01 NOTE — PROGRESS NOTES
The patient presents today to fu arthralgia and mod severe neck and LBP w hx DDD     Past Medical History:   Diagnosis Date    Bulging disc     Degenerative disc disease, cervical     Degenerative disc disease, lumbar     Fibrocystic breast      Past Surgical History:   Procedure Laterality Date    MANDIBLE FRACTURE SURGERY       Review of patient's allergies indicates:   Allergen Reactions    No known drug allergies      Current Outpatient Medications on File Prior to Visit   Medication Sig Dispense Refill    acetaminophen/dp-hydramine (EXCEDRIN PM ORAL) Excedrin   PRN      acyclovir (ZOVIRAX) 400 MG tablet Take 1 tablet (400 mg total) by mouth 4 (four) times daily. 40 tablet 1    albuterol (VENTOLIN HFA) 90 mcg/actuation inhaler Inhale 2 puffs into the lungs every 6 (six) hours as needed for Wheezing. Rescue 18 g 11    clobetasoL (TEMOVATE) 0.05 % Gel Apply topically 2 (two) times daily. 15 g 1    cyclobenzaprine (FLEXERIL) 10 MG tablet       estradiol-norethindrone (COMBIPATCH) 0.05-0.14 mg/24 hr APPLY TWICE A WEEK AS DIRECTED 24 patch 3    HYDROcodone-acetaminophen (NORCO)  mg per tablet Take 1 tablet by mouth 4 (four) times daily as needed for Pain. 120 tablet 0    levalbuterol (XOPENEX HFA) 45 mcg/actuation inhaler Inhale 1-2 puffs into the lungs every 4 (four) hours as needed for Wheezing. Rescue 15 g 11    meloxicam (MOBIC) 15 MG tablet TAKE 1 TABLET(15 MG) BY MOUTH EVERY DAY 90 tablet 4    naproxen sodium (ALEVE) 220 mg Cap Aleve   DAILY      traMADol (ULTRAM) 50 mg tablet Take 1 tablet (50 mg total) by mouth every 6 (six) hours. 120 tablet 0    zolpidem (AMBIEN) 10 mg Tab TAKE 1 TABLET BY MOUTH EVERY DAY AS NEEDED 30 tablet 5     No current facility-administered medications on file prior to visit.      Social History     Socioeconomic History    Marital status:      Spouse name: Not on file    Number of children: Not on file    Years of education: Not on file    Highest  education level: Not on file   Occupational History     Employer: [employer]   Social Needs    Financial resource strain: Not on file    Food insecurity     Worry: Not on file     Inability: Not on file    Transportation needs     Medical: Not on file     Non-medical: Not on file   Tobacco Use    Smoking status: Never Smoker    Smokeless tobacco: Never Used   Substance and Sexual Activity    Alcohol use: No    Drug use: No    Sexual activity: Yes     Birth control/protection: Post-menopausal   Lifestyle    Physical activity     Days per week: Not on file     Minutes per session: Not on file    Stress: Not on file   Relationships    Social connections     Talks on phone: Not on file     Gets together: Not on file     Attends Nondenominational service: Not on file     Active member of club or organization: Not on file     Attends meetings of clubs or organizations: Not on file     Relationship status: Not on file   Other Topics Concern    Not on file   Social History Narrative    Not on file     Family History   Problem Relation Age of Onset    Hypertension Mother     Diabetes Mother     Cancer Father     Breast cancer Paternal Aunt     Colon cancer Neg Hx     Ovarian cancer Neg Hx          ROS:GENERAL: No fever, chills, fatigability or weight loss.  SKIN: No rashes, itching or changes in color or texture of skin.  HEAD: No headaches or recent head trauma.EYES: Visual acuity fine. No photophobia, ocular pain or diplopia.EARS: Denies ear pain, discharge or vertigo.NOSE: No loss of smell, no epistaxis or postnasal drip.MOUTH & THROAT: No hoarseness or change in voice. No excessive gum bleeding.NODES: Denies swollen glands.  CHEST: Denies ARROYO, cyanosis, wheezing, cough and sputum production.  CARDIOVASCULAR: Denies chest pain, PND, orthopnea or reduced exercise tolerance.  ABDOMEN: Appetite fine. No weight loss. Denies diarrhea, abdominal pain, hematemesis or blood in stool.  URINARY: No flank pain,  dysuria or hematuria.  PERIPHERAL VASCULAR: No claudication or cyanosis.  MUSCULOSKELETAL: See above.  NEUROLOGIC: No history of seizures, paralysis, alteration of gait or coordination.  PE:   HEAD: Normocephalic, atraumatic.EYES: PERRL. EOMI.   EARS: TM's intact. Light reflex normal. No retraction or perforation.   NOSE: Mucosa pink. Airway clear.MOUTH & THROAT: No tonsillar enlargement. No pharyngeal erythema or exudate. No stridor.  NODES: No cervical, axillary or inguinal lymph node enlargement.  CHEST: Lungs clear to auscultation.  CARDIOVASCULAR: Normal S1, S2. No rubs, murmurs or gallops.  ABDOMEN: Bowel sounds normal. Not distended. Soft. No tenderness or masses.  MUSCULOSKELETAL: Mod gen degen changes NEUROLOGIC: Cranial Nerves: II-XII grossly intact.  Motor: 5/5 strength major flexors/extensors.  DTR's: Knees, Ankles 2+ and equal bilaterally; downgoing toes.  Sensory: Intact to light touch distally.  Gait & Posture: Normal gait and fine motion. No cerebellar signs.     Impression: Cervical and lumbar DDD  Hip arthralgia  GERD  Plan:   reviewed no concerning finding  Rev ok HC for sparing use  ~#120 q 3m

## 2020-09-28 ENCOUNTER — OFFICE VISIT (OUTPATIENT)
Dept: FAMILY MEDICINE | Facility: CLINIC | Age: 55
End: 2020-09-28
Payer: COMMERCIAL

## 2020-09-28 VITALS
HEART RATE: 63 BPM | WEIGHT: 135 LBS | HEIGHT: 67 IN | BODY MASS INDEX: 21.19 KG/M2 | SYSTOLIC BLOOD PRESSURE: 138 MMHG | RESPIRATION RATE: 18 BRPM | DIASTOLIC BLOOD PRESSURE: 79 MMHG | TEMPERATURE: 97 F

## 2020-09-28 DIAGNOSIS — J45.30 MILD PERSISTENT REACTIVE AIRWAY DISEASE WITHOUT COMPLICATION: ICD-10-CM

## 2020-09-28 DIAGNOSIS — I10 BENIGN ESSENTIAL HTN: ICD-10-CM

## 2020-09-28 DIAGNOSIS — G89.29 CHRONIC BILATERAL LOW BACK PAIN WITHOUT SCIATICA: Primary | ICD-10-CM

## 2020-09-28 DIAGNOSIS — M48.9 CERVICAL SPINE DISEASE: ICD-10-CM

## 2020-09-28 DIAGNOSIS — M54.50 CHRONIC BILATERAL LOW BACK PAIN WITHOUT SCIATICA: Primary | ICD-10-CM

## 2020-09-28 DIAGNOSIS — R10.13 DYSPEPSIA: ICD-10-CM

## 2020-09-28 PROCEDURE — 99999 PR PBB SHADOW E&M-EST. PATIENT-LVL IV: ICD-10-PCS | Mod: PBBFAC,,, | Performed by: FAMILY MEDICINE

## 2020-09-28 PROCEDURE — 99214 OFFICE O/P EST MOD 30 MIN: CPT | Mod: S$GLB,,, | Performed by: FAMILY MEDICINE

## 2020-09-28 PROCEDURE — 99999 PR PBB SHADOW E&M-EST. PATIENT-LVL IV: CPT | Mod: PBBFAC,,, | Performed by: FAMILY MEDICINE

## 2020-09-28 PROCEDURE — 99214 PR OFFICE/OUTPT VISIT, EST, LEVL IV, 30-39 MIN: ICD-10-PCS | Mod: S$GLB,,, | Performed by: FAMILY MEDICINE

## 2020-09-28 RX ORDER — HYDROCODONE BITARTRATE AND ACETAMINOPHEN 10; 325 MG/1; MG/1
1 TABLET ORAL 4 TIMES DAILY PRN
Qty: 120 TABLET | Refills: 0 | Status: SHIPPED | OUTPATIENT
Start: 2020-09-28 | End: 2020-12-22 | Stop reason: SDUPTHER

## 2020-09-28 NOTE — PROGRESS NOTES
The patient presents today to fu arthralgia and mod severe neck and LBP w hx DDD   BP controlled w current meds -see note below. Has ongoing chest congestion and mucous production since Pn few years ago   Also some dyspepsia and will be seeing GI next week   Past Medical History:   Diagnosis Date    Bulging disc     Degenerative disc disease, cervical     Degenerative disc disease, lumbar     Fibrocystic breast      Past Surgical History:   Procedure Laterality Date    MANDIBLE FRACTURE SURGERY       Review of patient's allergies indicates:   Allergen Reactions    No known drug allergies      Current Outpatient Medications on File Prior to Visit   Medication Sig Dispense Refill    acetaminophen/dp-hydramine (EXCEDRIN PM ORAL) Excedrin   PRN      acyclovir (ZOVIRAX) 400 MG tablet Take 1 tablet (400 mg total) by mouth 4 (four) times daily. 40 tablet 1    albuterol (VENTOLIN HFA) 90 mcg/actuation inhaler Inhale 2 puffs into the lungs every 6 (six) hours as needed for Wheezing. Rescue 18 g 11    atenoloL (TENORMIN) 25 MG tablet Take 1 tablet (25 mg total) by mouth once daily. 90 tablet 3    clobetasoL (TEMOVATE) 0.05 % Gel Apply topically 2 (two) times daily. 15 g 1    cyclobenzaprine (FLEXERIL) 10 MG tablet       estradiol-norethindrone (COMBIPATCH) 0.05-0.14 mg/24 hr APPLY TWICE A WEEK AS DIRECTED 24 patch 3    HYDROcodone-acetaminophen (NORCO)  mg per tablet Take 1 tablet by mouth 4 (four) times daily as needed for Pain. 120 tablet 0    levalbuterol (XOPENEX HFA) 45 mcg/actuation inhaler Inhale 1-2 puffs into the lungs every 4 (four) hours as needed for Wheezing. Rescue 15 g 11    meloxicam (MOBIC) 15 MG tablet TAKE 1 TABLET(15 MG) BY MOUTH EVERY DAY 90 tablet 4    naproxen sodium (ALEVE) 220 mg Cap Aleve   DAILY      traMADol (ULTRAM) 50 mg tablet Take 1 tablet (50 mg total) by mouth every 6 (six) hours. 120 tablet 0    zolpidem (AMBIEN) 10 mg Tab TAKE 1 TABLET BY MOUTH EVERY DAY AS NEEDED  30 tablet 5     No current facility-administered medications on file prior to visit.      Social History     Socioeconomic History    Marital status:      Spouse name: Not on file    Number of children: Not on file    Years of education: Not on file    Highest education level: Not on file   Occupational History     Employer: [employer]   Social Needs    Financial resource strain: Not on file    Food insecurity     Worry: Not on file     Inability: Not on file    Transportation needs     Medical: Not on file     Non-medical: Not on file   Tobacco Use    Smoking status: Never Smoker    Smokeless tobacco: Never Used   Substance and Sexual Activity    Alcohol use: No    Drug use: No    Sexual activity: Yes     Birth control/protection: Post-menopausal   Lifestyle    Physical activity     Days per week: Not on file     Minutes per session: Not on file    Stress: Not on file   Relationships    Social connections     Talks on phone: Not on file     Gets together: Not on file     Attends Sabianism service: Not on file     Active member of club or organization: Not on file     Attends meetings of clubs or organizations: Not on file     Relationship status: Not on file   Other Topics Concern    Not on file   Social History Narrative    Not on file     Family History   Problem Relation Age of Onset    Hypertension Mother     Diabetes Mother     Cancer Father     Breast cancer Paternal Aunt     Colon cancer Neg Hx     Ovarian cancer Neg Hx          ROS:GENERAL: No fever, chills, fatigability or weight loss.  SKIN: No rashes, itching or changes in color or texture of skin.  HEAD: No headaches or recent head trauma.EYES: Visual acuity fine. No photophobia, ocular pain or diplopia.EARS: Denies ear pain, discharge or vertigo.NOSE: No loss of smell, no epistaxis or postnasal drip.MOUTH & THROAT: No hoarseness or change in voice. No excessive gum bleeding.NODES: Denies swollen glands.  CHEST:  Denies ARROYO, cyanosis, wheezing, cough and sputum production.  CARDIOVASCULAR: Denies chest pain, PND, orthopnea or reduced exercise tolerance.  ABDOMEN: Appetite fine. No weight loss. Denies diarrhea, abdominal pain, hematemesis or blood in stool.  URINARY: No flank pain, dysuria or hematuria.  PERIPHERAL VASCULAR: No claudication or cyanosis.  MUSCULOSKELETAL: See above.  NEUROLOGIC: No history of seizures, paralysis, alteration of gait or coordination.  PE:   HEAD: Normocephalic, atraumatic.EYES: PERRL. EOMI.   EARS: TM's intact. Light reflex normal. No retraction or perforation.   NOSE: Mucosa pink. Airway clear.MOUTH & THROAT: No tonsillar enlargement. No pharyngeal erythema or exudate. No stridor.  NODES: No cervical, axillary or inguinal lymph node enlargement.  CHEST: Lungs clear to auscultation.  CARDIOVASCULAR: Normal S1, S2. No rubs, murmurs or gallops.  ABDOMEN: Bowel sounds normal. Not distended. Soft. No tenderness or masses.  MUSCULOSKELETAL: Mod gen degen changes NEUROLOGIC: Cranial Nerves: II-XII grossly intact.  Motor: 5/5 strength major flexors/extensors.  DTR's: Knees, Ankles 2+ and equal bilaterally; downgoing toes.  Sensory: Intact to light touch distally.  Gait & Posture: Normal gait and fine motion. No cerebellar signs.     Impression: Cervical and lumbar DDD  Hip arthralgia  GERD  HBP  Reactive airway diasease  Sp Pn remotely with persistent mucous   Plan: Note atenolol causes sl malaise but helpful for OTJ stress so we are going to continue  Will try budesonide for chronic pulmonary congestion w precaution    reviewed no concerning finding  Rev ok HC for sparing use  ~#120 q 3m

## 2020-10-16 ENCOUNTER — PATIENT MESSAGE (OUTPATIENT)
Dept: FAMILY MEDICINE | Facility: CLINIC | Age: 55
End: 2020-10-16

## 2020-10-16 NOTE — TELEPHONE ENCOUNTER
Set up appointment today with available provider/KAMALA.  Patient needs to have urinalysis reflex prior to visit.  Let me know if nothing is available.  Patient can come 1st thing tomorrow morning if needed.

## 2020-10-19 ENCOUNTER — HOSPITAL ENCOUNTER (OUTPATIENT)
Dept: RADIOLOGY | Facility: HOSPITAL | Age: 55
Discharge: HOME | End: 2020-10-19
Attending: INTERNAL MEDICINE
Payer: COMMERCIAL

## 2020-10-19 ENCOUNTER — TELEPHONE (OUTPATIENT)
Dept: FAMILY MEDICINE | Facility: CLINIC | Age: 55
End: 2020-10-19

## 2020-10-19 ENCOUNTER — HOSPITAL ENCOUNTER (OUTPATIENT)
Dept: RADIOLOGY | Facility: HOSPITAL | Age: 55
Discharge: HOME | End: 2020-10-19
Attending: FAMILY MEDICINE
Payer: COMMERCIAL

## 2020-10-19 ENCOUNTER — OFFICE VISIT (OUTPATIENT)
Dept: FAMILY MEDICINE | Facility: CLINIC | Age: 55
End: 2020-10-19
Payer: COMMERCIAL

## 2020-10-19 VITALS
HEART RATE: 63 BPM | WEIGHT: 132 LBS | DIASTOLIC BLOOD PRESSURE: 82 MMHG | RESPIRATION RATE: 18 BRPM | TEMPERATURE: 98 F | BODY MASS INDEX: 20.72 KG/M2 | HEIGHT: 67 IN | SYSTOLIC BLOOD PRESSURE: 132 MMHG

## 2020-10-19 DIAGNOSIS — R10.2 PELVIC PAIN: Primary | ICD-10-CM

## 2020-10-19 DIAGNOSIS — R05.9 COUGH: ICD-10-CM

## 2020-10-19 DIAGNOSIS — R10.2 PELVIC PAIN: ICD-10-CM

## 2020-10-19 PROCEDURE — 99213 OFFICE O/P EST LOW 20 MIN: CPT | Mod: S$GLB,,, | Performed by: FAMILY MEDICINE

## 2020-10-19 PROCEDURE — 99999 PR PBB SHADOW E&M-EST. PATIENT-LVL V: CPT | Mod: PBBFAC,,, | Performed by: FAMILY MEDICINE

## 2020-10-19 PROCEDURE — 76856 US EXAM PELVIC COMPLETE: CPT | Mod: 26,,, | Performed by: RADIOLOGY

## 2020-10-19 PROCEDURE — 71046 X-RAY EXAM CHEST 2 VIEWS: CPT | Mod: 26,,, | Performed by: RADIOLOGY

## 2020-10-19 PROCEDURE — 99999 PR PBB SHADOW E&M-EST. PATIENT-LVL V: ICD-10-PCS | Mod: PBBFAC,,, | Performed by: FAMILY MEDICINE

## 2020-10-19 PROCEDURE — 76830 TRANSVAGINAL US NON-OB: CPT | Mod: TC,PO

## 2020-10-19 PROCEDURE — 71046 XR CHEST PA AND LATERAL: ICD-10-PCS | Mod: 26,,, | Performed by: RADIOLOGY

## 2020-10-19 PROCEDURE — 99213 PR OFFICE/OUTPT VISIT, EST, LEVL III, 20-29 MIN: ICD-10-PCS | Mod: S$GLB,,, | Performed by: FAMILY MEDICINE

## 2020-10-19 PROCEDURE — 76830 TRANSVAGINAL US NON-OB: CPT | Mod: 26,,, | Performed by: RADIOLOGY

## 2020-10-19 PROCEDURE — 76830 US PELVIS COMP WITH TRANSVAG NON-OB (XPD): ICD-10-PCS | Mod: 26,,, | Performed by: RADIOLOGY

## 2020-10-19 PROCEDURE — 76856 US PELVIS COMP WITH TRANSVAG NON-OB (XPD): ICD-10-PCS | Mod: 26,,, | Performed by: RADIOLOGY

## 2020-10-19 PROCEDURE — 71046 X-RAY EXAM CHEST 2 VIEWS: CPT | Mod: TC,FY,PO

## 2020-10-19 RX ORDER — PANTOPRAZOLE SODIUM 40 MG/1
1 TABLET, DELAYED RELEASE ORAL DAILY PRN
COMMUNITY
Start: 2020-10-05 | End: 2020-12-02

## 2020-10-19 RX ORDER — METRONIDAZOLE 250 MG/1
TABLET ORAL
COMMUNITY
Start: 2020-10-05 | End: 2020-12-02

## 2020-10-19 RX ORDER — CIPROFLOXACIN 500 MG/1
TABLET ORAL
COMMUNITY
Start: 2020-10-05 | End: 2020-12-02

## 2020-10-19 NOTE — TELEPHONE ENCOUNTER
----- Message from Stephenie Champagne sent at 10/19/2020  2:15 PM CDT -----  Type:  Patient Returning Call    Who Called:Wendie  Who Left Message for Patient:Kayli  Does the patient know what this is regarding?:yes  Would the patient rather a call back or a response via [facility]? call  Best Call Back Number:[phone]    Additional Information:

## 2020-10-19 NOTE — TELEPHONE ENCOUNTER
----- Message from Nereida Buck sent at 10/19/2020 12:14 PM CDT -----  Regarding: OB-GYN referral  Contact: preservice  The OB-GYN referral is denied because [facility] facilities and providers are out of network with the patient's [facility]  policy and the patient does not have out of network benefits. If the patient still decides to be seen she may receive a bill for services rendered.

## 2020-10-19 NOTE — TELEPHONE ENCOUNTER
Patient is asking if the US order can be changed from US complete to US/ Abdomen complete since she has a kidney stone on the right side. Please advise. Patient is scheduled in [place] this afternoon

## 2020-10-19 NOTE — PROGRESS NOTES
The patient presents with tender painful superpubic area. ear for the past 12 days    but no fever. Pain is severe and no dysuria   Alessandro thought could be diverticulitis and rx cipro/flagyl . Imaging 1 y ago shows calcified fibroids   ROS:  General: No fever or sig wt change  HEENT:No other PND eye pain or dc  Respiratory: No cough wheezing  PE: vital signs noted  HEENT: Normocephalic,with no recent trauma,PERRLA,EOMI,conjunctiva injected with no exudate.Nasopharynx is injected and edematous.External otic canal edematous and injected TM dull  Neck:Supple without adenopathy  Chest:Clear bilateral breath sounds   Heart:Regular rhthym without murmer  Abdomen:Soft, non tender,no masses, no hepatosplenomegaly  Extremeties and Neurologic:Grossly within normal limits     Impression:Pelvic pain   Plan:  Pelvic US

## 2020-10-20 ENCOUNTER — PATIENT OUTREACH (OUTPATIENT)
Dept: ADMINISTRATIVE | Facility: OTHER | Age: 55
End: 2020-10-20

## 2020-10-20 NOTE — PROGRESS NOTES
Health Maintenance Due   Topic Date Due    HIV Screening  09/11/1980    Shingles Vaccine (1 of 2) 09/11/2014    Mammogram  04/10/2020    Influenza Vaccine (1) 08/01/2020     Updates were requested from care everywhere.  Chart was reviewed for overdue Proactive [facility] Encounters (NADIRA) topics (CRS, Breast Cancer Screening, Eye exam)  Health Maintenance has been updated.  LINKS immunization registry triggered.  Immunizations were reconciled.

## 2020-10-21 ENCOUNTER — OFFICE VISIT (OUTPATIENT)
Dept: OBSTETRICS AND GYNECOLOGY | Facility: CLINIC | Age: 55
End: 2020-10-21
Payer: COMMERCIAL

## 2020-10-21 VITALS
BODY MASS INDEX: 20.59 KG/M2 | DIASTOLIC BLOOD PRESSURE: 76 MMHG | HEIGHT: 67 IN | SYSTOLIC BLOOD PRESSURE: 114 MMHG | WEIGHT: 131.19 LBS

## 2020-10-21 DIAGNOSIS — Z11.3 SCREEN FOR STD (SEXUALLY TRANSMITTED DISEASE): ICD-10-CM

## 2020-10-21 DIAGNOSIS — R10.2 PELVIC PAIN: Primary | ICD-10-CM

## 2020-10-21 PROCEDURE — 87801 DETECT AGNT MULT DNA AMPLI: CPT

## 2020-10-21 PROCEDURE — 99999 PR PBB SHADOW E&M-EST. PATIENT-LVL III: ICD-10-PCS | Mod: PBBFAC,,, | Performed by: OBSTETRICS & GYNECOLOGY

## 2020-10-21 PROCEDURE — 87481 CANDIDA DNA AMP PROBE: CPT | Mod: 59

## 2020-10-21 PROCEDURE — 99214 PR OFFICE/OUTPT VISIT, EST, LEVL IV, 30-39 MIN: ICD-10-PCS | Mod: S$GLB,,, | Performed by: OBSTETRICS & GYNECOLOGY

## 2020-10-21 PROCEDURE — 87491 CHLMYD TRACH DNA AMP PROBE: CPT

## 2020-10-21 PROCEDURE — 99999 PR PBB SHADOW E&M-EST. PATIENT-LVL III: CPT | Mod: PBBFAC,,, | Performed by: OBSTETRICS & GYNECOLOGY

## 2020-10-21 PROCEDURE — 99214 OFFICE O/P EST MOD 30 MIN: CPT | Mod: S$GLB,,, | Performed by: OBSTETRICS & GYNECOLOGY

## 2020-10-21 NOTE — PROGRESS NOTES
Pt is 55 y.o. here for evaluation of pelvic pain.     Postmenopausal  Pain started 2 wk ago  She took Cipro & Flagyl for diverticulitis  Pain not improved  US completed: normal pelvic anatomy, small fibroids noted  Pain improved some, but radiates to the right  No BM problems  Hurts more with sitting  Colonoscopy planned in 6-8 wk  C/o bladder issues; however, UA & culture neg  Apt with Urology  History of passed kidney stone  She states she has a current right kidney stone    Past Medical History:   Diagnosis Date    Bulging disc     Degenerative disc disease, cervical     Degenerative disc disease, lumbar     Fibrocystic breast      Past Surgical History:   Procedure Laterality Date    MANDIBLE FRACTURE SURGERY       Family History   Problem Relation Age of Onset    Hypertension Mother     Diabetes Mother     Cancer Father     Breast cancer Paternal Aunt     Colon cancer Neg Hx     Ovarian cancer Neg Hx      Social History     Socioeconomic History    Marital status:      Spouse name: Not on file    Number of children: Not on file    Years of education: Not on file    Highest education level: Not on file   Occupational History     Employer: [employer]   Social Needs    Financial resource strain: Not on file    Food insecurity     Worry: Not on file     Inability: Not on file    Transportation needs     Medical: Not on file     Non-medical: Not on file   Tobacco Use    Smoking status: Never Smoker    Smokeless tobacco: Never Used   Substance and Sexual Activity    Alcohol use: No    Drug use: No    Sexual activity: Yes     Birth control/protection: Post-menopausal   Lifestyle    Physical activity     Days per week: Not on file     Minutes per session: Not on file    Stress: Not on file   Relationships    Social connections     Talks on phone: Not on file     Gets together: Not on file     Attends Druze service: Not on file     Active member of club or organization:  Not on file     Attends meetings of clubs or organizations: Not on file     Relationship status: Not on file   Other Topics Concern    Not on file   Social History Narrative    Not on file     Review of patient's allergies indicates:   Allergen Reactions    Coconut Itching and Swelling     Current Outpatient Medications on File Prior to Visit   Medication Sig Dispense Refill    atenoloL (TENORMIN) 25 MG tablet Take 1 tablet (25 mg total) by mouth once daily. 90 tablet 3    estradiol-norethindrone (COMBIPATCH) 0.05-0.14 mg/24 hr APPLY TWICE A WEEK AS DIRECTED 24 patch 3    HYDROcodone-acetaminophen (NORCO)  mg per tablet Take 1 tablet by mouth 4 (four) times daily as needed for Pain. 120 tablet 0    meloxicam (MOBIC) 15 MG tablet TAKE 1 TABLET(15 MG) BY MOUTH EVERY DAY 90 tablet 4    pantoprazole (PROTONIX) 40 MG tablet Take 1 tablet by mouth daily as needed.      zolpidem (AMBIEN) 10 mg Tab TAKE 1 TABLET BY MOUTH EVERY DAY AS NEEDED 30 tablet 5    acetaminophen/dp-hydramine (EXCEDRIN PM ORAL) Excedrin   PRN      acyclovir (ZOVIRAX) 400 MG tablet Take 1 tablet (400 mg total) by mouth 4 (four) times daily. (Patient not taking: Reported on 10/21/2020) 40 tablet 1    albuterol (VENTOLIN HFA) 90 mcg/actuation inhaler Inhale 2 puffs into the lungs every 6 (six) hours as needed for Wheezing. Rescue (Patient not taking: Reported on 10/21/2020) 18 g 11    budesonide (PULMICORT FLEXHALER) 90 mcg/actuation AePB Inhale 2 puffs (180 mcg total) into the lungs once daily. Controller (Patient not taking: Reported on 10/21/2020) 90 each 4    ciprofloxacin HCl (CIPRO) 500 MG tablet TK 1 T PO BID FOR 14 DAYS      clobetasoL (TEMOVATE) 0.05 % Gel Apply topically 2 (two) times daily. 15 g 1    cyclobenzaprine (FLEXERIL) 10 MG tablet       levalbuterol (XOPENEX HFA) 45 mcg/actuation inhaler Inhale 1-2 puffs into the lungs every 4 (four) hours as needed for Wheezing. Rescue (Patient not taking: Reported on  "10/21/2020) 15 g 11    metroNIDAZOLE (FLAGYL) 250 MG tablet TK 1 T PO TID FOR 14 DAYS      naproxen sodium (ALEVE) 220 mg Cap Aleve   DAILY      traMADol (ULTRAM) 50 mg tablet Take 1 tablet (50 mg total) by mouth every 6 (six) hours. 120 tablet 0     No current facility-administered medications on file prior to visit.        ROS:  GENERAL: No fever, chills, fatigability or weight loss.  VULVAR: No pain, no lesions and no itching.  VAGINAL: No relaxation, no itching, no discharge, no abnormal bleeding and no lesions.  ABDOMEN:  Denies nausea. Denies vomiting. No diarrhea. No constipation  BREAST: Denies pain. No lumps. No discharge.  URINARY: No incontinence, no nocturia  CARDIOVASCULAR: No chest pain. No shortness of breath. No leg cramps.  NEUROLOGICAL: no headaches. No vision changes.    /76   Ht 5' 7" (1.702 m)   Wt 59.5 kg (131 lb 2.8 oz)   LMP 01/05/2014   BMI 20.54 kg/m²   Patient's last menstrual period was 01/05/2014.  General: no distress  Abdomen:  no masses, no hepatosplenomegaly, no hernias  Genitalia:  normal external genitalia, no erythema, no discharge  Urethra: normal appearing urethra with no masses, tenderness or lesions  Vagina: no vaginal discharge  Cervix:  normal appearance  Uterus:  normal size, contour, position, consistency, mobility, non-tender  Adnexa:  no mass, fullness, tenderness    Assessment and Plan  Wendie was seen today for pelvic pain.    Diagnoses and all orders for this visit:    Pelvic pain    Screen for STD (sexually transmitted disease)  -     C. trachomatis/N. gonorrhoeae by AMP DNA [facility]; Cervix  -     Vaginosis Screen by DNA Probe    Advised to follow up with Urology for h/o kidney stones.     "

## 2020-10-27 LAB
BACTERIAL VAGINOSIS DNA: NEGATIVE
CANDIDA GLABRATA DNA: NEGATIVE
CANDIDA KRUSEI DNA: NEGATIVE
CANDIDA RRNA VAG QL PROBE: NEGATIVE
T VAGINALIS RRNA GENITAL QL PROBE: NEGATIVE

## 2020-11-10 ENCOUNTER — TELEPHONE (OUTPATIENT)
Dept: OBSTETRICS AND GYNECOLOGY | Facility: CLINIC | Age: 55
End: 2020-11-10

## 2020-11-10 NOTE — TELEPHONE ENCOUNTER
----- Message from Ariana Gallegos sent at 11/10/2020 12:18 PM CST -----  Regarding: advice  Contact: pt  Type: Needs Medical Advice  Who Called:  patent  Symptoms (please be specific):  r/o pelvic pain   How long has patient had these symptoms:  5 weeks   Pharmacy name and phone #:    Best Call Back Number: [phone]  Additional Information: Patient just Saw Dr. Miles she is asking to see you for a second opinion for her pelvic pain.

## 2020-11-10 NOTE — TELEPHONE ENCOUNTER
Called patient back and scheduled them appointment per request for second opinion due toPelvic pain. Patient request first Wednesday available in December.

## 2020-11-18 PROBLEM — R93.89 ABNORMAL CHEST CT: Status: ACTIVE | Noted: 2020-11-18

## 2020-11-18 PROBLEM — J47.9 BRONCHIECTASIS WITHOUT COMPLICATION: Status: ACTIVE | Noted: 2020-11-18

## 2020-12-01 ENCOUNTER — PATIENT OUTREACH (OUTPATIENT)
Dept: ADMINISTRATIVE | Facility: HOSPITAL | Age: 55
End: 2020-12-01

## 2020-12-01 ENCOUNTER — PATIENT OUTREACH (OUTPATIENT)
Dept: ADMINISTRATIVE | Facility: OTHER | Age: 55
End: 2020-12-01

## 2020-12-01 NOTE — PROGRESS NOTES
Health Maintenance Due   Topic Date Due    HIV Screening  09/11/1980    Shingles Vaccine (1 of 2) 09/11/2014    Mammogram  04/10/2020     Updates were requested from care everywhere.  Chart was reviewed for overdue [facility]active [facility] Encounters ([facility]) topics (CRS, Breast Cancer Screening, Eye exam)  Health Maintenance has been updated.  LINKS immunization registry triggered.  LINKS not responding.

## 2020-12-02 ENCOUNTER — OFFICE VISIT (OUTPATIENT)
Dept: OBSTETRICS AND GYNECOLOGY | Facility: CLINIC | Age: 55
End: 2020-12-02
Payer: COMMERCIAL

## 2020-12-02 VITALS
BODY MASS INDEX: 20.62 KG/M2 | WEIGHT: 131.38 LBS | SYSTOLIC BLOOD PRESSURE: 148 MMHG | HEIGHT: 67 IN | DIASTOLIC BLOOD PRESSURE: 88 MMHG

## 2020-12-02 DIAGNOSIS — R10.2 PELVIC PAIN: Primary | ICD-10-CM

## 2020-12-02 DIAGNOSIS — D25.1 INTRAMURAL LEIOMYOMA OF UTERUS: ICD-10-CM

## 2020-12-02 PROCEDURE — 99999 PR PBB SHADOW E&M-EST. PATIENT-LVL III: CPT | Mod: PBBFAC,,, | Performed by: OBSTETRICS & GYNECOLOGY

## 2020-12-02 PROCEDURE — 99999 PR PBB SHADOW E&M-EST. PATIENT-LVL III: ICD-10-PCS | Mod: PBBFAC,,, | Performed by: OBSTETRICS & GYNECOLOGY

## 2020-12-02 PROCEDURE — 99213 PR OFFICE/OUTPT VISIT, EST, LEVL III, 20-29 MIN: ICD-10-PCS | Mod: S$GLB,,, | Performed by: OBSTETRICS & GYNECOLOGY

## 2020-12-02 PROCEDURE — 99213 OFFICE O/P EST LOW 20 MIN: CPT | Mod: S$GLB,,, | Performed by: OBSTETRICS & GYNECOLOGY

## 2020-12-02 NOTE — PROGRESS NOTES
Here for counseling, lower pelvic pains- counseled.     CT scan report from1 year ago and earlier this month- mild diverticulat disease.     Reports midline lower pelvic pains lasting week or so at a time.     On weekly combipatch.     Pelvic ultrasound last month reviewed, thin endometrium, small 1cm - 2cm calcified intramural fibroids, normal adnexa.     15 minutes spent with patient with > 1/2 time in counseling.      Plan:  D/c HRT patch x 1 month and see how things go.   If pain improved off of patch, treat symptoms as needed - will follow up via patient portal.

## 2020-12-22 ENCOUNTER — OFFICE VISIT (OUTPATIENT)
Dept: FAMILY MEDICINE | Facility: CLINIC | Age: 55
End: 2020-12-22
Payer: COMMERCIAL

## 2020-12-22 VITALS
HEIGHT: 67 IN | HEART RATE: 58 BPM | BODY MASS INDEX: 20.56 KG/M2 | DIASTOLIC BLOOD PRESSURE: 88 MMHG | RESPIRATION RATE: 16 BRPM | TEMPERATURE: 98 F | SYSTOLIC BLOOD PRESSURE: 138 MMHG | WEIGHT: 131 LBS

## 2020-12-22 DIAGNOSIS — J47.9 BRONCHIECTASIS WITHOUT COMPLICATION: ICD-10-CM

## 2020-12-22 DIAGNOSIS — G89.29 CHRONIC BILATERAL LOW BACK PAIN WITHOUT SCIATICA: Primary | ICD-10-CM

## 2020-12-22 DIAGNOSIS — M54.50 CHRONIC BILATERAL LOW BACK PAIN WITHOUT SCIATICA: Primary | ICD-10-CM

## 2020-12-22 PROCEDURE — 99999 PR PBB SHADOW E&M-EST. PATIENT-LVL III: CPT | Mod: PBBFAC,,, | Performed by: FAMILY MEDICINE

## 2020-12-22 PROCEDURE — 99213 PR OFFICE/OUTPT VISIT, EST, LEVL III, 20-29 MIN: ICD-10-PCS | Mod: S$GLB,,, | Performed by: FAMILY MEDICINE

## 2020-12-22 PROCEDURE — 99213 OFFICE O/P EST LOW 20 MIN: CPT | Mod: S$GLB,,, | Performed by: FAMILY MEDICINE

## 2020-12-22 PROCEDURE — 99999 PR PBB SHADOW E&M-EST. PATIENT-LVL III: ICD-10-PCS | Mod: PBBFAC,,, | Performed by: FAMILY MEDICINE

## 2020-12-22 RX ORDER — HYDROCODONE BITARTRATE AND ACETAMINOPHEN 10; 325 MG/1; MG/1
1 TABLET ORAL 4 TIMES DAILY PRN
Qty: 120 TABLET | Refills: 0 | Status: SHIPPED | OUTPATIENT
Start: 2020-12-22 | End: 2021-03-18 | Stop reason: SDUPTHER

## 2020-12-22 NOTE — PROGRESS NOTES
The patient presents today to fu arthralgia and mod severe neck and LBP w hx DDD   BP controlled w current meds -see note below. Has ongoing chest congestion and mucous production since Pn few years ago -see recent Pulmonary consult dx bronchiectasis Also low abd pain-see Gyn consult  Past Medical History:   Diagnosis Date    Bulging disc     Degenerative disc disease, cervical     Degenerative disc disease, lumbar     Fibrocystic breast      Past Surgical History:   Procedure Laterality Date    MANDIBLE FRACTURE SURGERY       Review of patient's allergies indicates:   Allergen Reactions    No known drug allergies      Current Outpatient Medications on File Prior to Visit   Medication Sig Dispense Refill    acetaminophen/dp-hydramine (EXCEDRIN PM ORAL) Excedrin   PRN      acyclovir (ZOVIRAX) 400 MG tablet Take 1 tablet (400 mg total) by mouth 4 (four) times daily. 40 tablet 1    albuterol (VENTOLIN HFA) 90 mcg/actuation inhaler Inhale 2 puffs into the lungs every 6 (six) hours as needed for Wheezing. Rescue 18 g 11    atenoloL (TENORMIN) 25 MG tablet Take 1 tablet (25 mg total) by mouth once daily. 90 tablet 3    clobetasoL (TEMOVATE) 0.05 % Gel Apply topically 2 (two) times daily. 15 g 1    cyclobenzaprine (FLEXERIL) 10 MG tablet       estradiol-norethindrone (COMBIPATCH) 0.05-0.14 mg/24 hr APPLY TWICE A WEEK AS DIRECTED 24 patch 3    HYDROcodone-acetaminophen (NORCO)  mg per tablet Take 1 tablet by mouth 4 (four) times daily as needed for Pain. 120 tablet 0    levalbuterol (XOPENEX HFA) 45 mcg/actuation inhaler Inhale 1-2 puffs into the lungs every 4 (four) hours as needed for Wheezing. Rescue 15 g 11    meloxicam (MOBIC) 15 MG tablet TAKE 1 TABLET(15 MG) BY MOUTH EVERY DAY 90 tablet 4    naproxen sodium (ALEVE) 220 mg Cap Aleve   DAILY      traMADol (ULTRAM) 50 mg tablet Take 1 tablet (50 mg total) by mouth every 6 (six) hours. 120 tablet 0    zolpidem (AMBIEN) 10 mg Tab TAKE 1 TABLET BY  MOUTH EVERY DAY AS NEEDED 30 tablet 5     No current facility-administered medications on file prior to visit.      Social History     Socioeconomic History    Marital status:      Spouse name: Not on file    Number of children: Not on file    Years of education: Not on file    Highest education level: Not on file   Occupational History     Employer: [employer]   Social Needs    Financial resource strain: Not on file    Food insecurity     Worry: Not on file     Inability: Not on file    Transportation needs     Medical: Not on file     Non-medical: Not on file   Tobacco Use    Smoking status: Never Smoker    Smokeless tobacco: Never Used   Substance and Sexual Activity    Alcohol use: No    Drug use: No    Sexual activity: Yes     Birth control/protection: Post-menopausal   Lifestyle    Physical activity     Days per week: Not on file     Minutes per session: Not on file    Stress: Not on file   Relationships    Social connections     Talks on phone: Not on file     Gets together: Not on file     Attends Denominational service: Not on file     Active member of club or organization: Not on file     Attends meetings of clubs or organizations: Not on file     Relationship status: Not on file   Other Topics Concern    Not on file   Social History Narrative    Not on file     Family History   Problem Relation Age of Onset    Hypertension Mother     Diabetes Mother     Cancer Father     Breast cancer Paternal Aunt     Colon cancer Neg Hx     Ovarian cancer Neg Hx          ROS:GENERAL: No fever, chills, fatigability or weight loss.  SKIN: No rashes, itching or changes in color or texture of skin.  HEAD: No headaches or recent head trauma.EYES: Visual acuity fine. No photophobia, ocular pain or diplopia.EARS: Denies ear pain, discharge or vertigo.NOSE: No loss of smell, no epistaxis or postnasal drip.MOUTH & THROAT: No hoarseness or change in voice. No excessive gum bleeding.NODES: Denies  swollen glands.  CHEST: Denies ARROYO, cyanosis, wheezing, cough and sputum production.  CARDIOVASCULAR: Denies chest pain, PND, orthopnea or reduced exercise tolerance.  ABDOMEN: Appetite fine. No weight loss. Denies diarrhea, abdominal pain, hematemesis or blood in stool.  URINARY: No flank pain, dysuria or hematuria.  PERIPHERAL VASCULAR: No claudication or cyanosis.  MUSCULOSKELETAL: See above.  NEUROLOGIC: No history of seizures, paralysis, alteration of gait or coordination.  PE:   HEAD: Normocephalic, atraumatic.EYES: PERRL. EOMI.   EARS: TM's intact. Light reflex normal. No retraction or perforation.   NOSE: Mucosa pink. Airway clear.MOUTH & THROAT: No tonsillar enlargement. No pharyngeal erythema or exudate. No stridor.  NODES: No cervical, axillary or inguinal lymph node enlargement.  CHEST: Lungs clear to auscultation.  CARDIOVASCULAR: Normal S1, S2. No rubs, murmurs or gallops.  ABDOMEN: Bowel sounds normal. Not distended. Soft. No tenderness or masses.  MUSCULOSKELETAL: Mod gen degen changes NEUROLOGIC: Cranial Nerves: II-XII grossly intact.  Motor: 5/5 strength major flexors/extensors.  DTR's: Knees, Ankles 2+ and equal bilaterally; downgoing toes.  Sensory: Intact to light touch distally.  Gait & Posture: Normal gait and fine motion. No cerebellar signs.     Impression: Cervical and lumbar DDD  Hip arthralgia  GERD  HBP  Reactive airway diasease  Sp Pn remotely with persistent mucous   Plan: Note atenolol causes sl malaise but helpful for OTJ stress so we are going to continue  Will try budesonide for chronic pulmonary congestion w precaution    reviewed no concerning finding  Rev ok HC for sparing use  ~#120 q 3m

## 2021-01-07 ENCOUNTER — PATIENT MESSAGE (OUTPATIENT)
Dept: FAMILY MEDICINE | Facility: CLINIC | Age: 56
End: 2021-01-07

## 2021-01-07 RX ORDER — METHOCARBAMOL 500 MG/1
500 TABLET, FILM COATED ORAL 4 TIMES DAILY
Qty: 40 TABLET | Refills: 0 | Status: SHIPPED | OUTPATIENT
Start: 2021-01-07 | End: 2021-01-17

## 2021-01-08 ENCOUNTER — PATIENT MESSAGE (OUTPATIENT)
Dept: FAMILY MEDICINE | Facility: CLINIC | Age: 56
End: 2021-01-08

## 2021-01-08 RX ORDER — ZOLPIDEM TARTRATE 10 MG/1
TABLET ORAL
Qty: 30 TABLET | Refills: 5 | Status: SHIPPED | OUTPATIENT
Start: 2021-01-08 | End: 2021-03-18 | Stop reason: SDUPTHER

## 2021-01-08 RX ORDER — ZOLPIDEM TARTRATE 10 MG/1
10 TABLET ORAL DAILY PRN
Qty: 30 TABLET | Refills: 5 | OUTPATIENT
Start: 2021-01-08

## 2021-02-01 ENCOUNTER — PATIENT MESSAGE (OUTPATIENT)
Dept: OBSTETRICS AND GYNECOLOGY | Facility: CLINIC | Age: 56
End: 2021-02-01

## 2021-02-01 DIAGNOSIS — Z78.0 MENOPAUSE: ICD-10-CM

## 2021-02-01 RX ORDER — ESTRADIOL/NORETHINDRONE ACETATE TRANSDERMAL SYSTEM .05; .14 MG/D; MG/D
PATCH, EXTENDED RELEASE TRANSDERMAL
Qty: 24 PATCH | Refills: 0 | Status: SHIPPED | OUTPATIENT
Start: 2021-02-01 | End: 2021-05-24

## 2021-03-18 ENCOUNTER — OFFICE VISIT (OUTPATIENT)
Dept: FAMILY MEDICINE | Facility: CLINIC | Age: 56
End: 2021-03-18
Payer: COMMERCIAL

## 2021-03-18 VITALS
SYSTOLIC BLOOD PRESSURE: 137 MMHG | WEIGHT: 137.63 LBS | HEIGHT: 67 IN | DIASTOLIC BLOOD PRESSURE: 86 MMHG | HEART RATE: 58 BPM | BODY MASS INDEX: 21.6 KG/M2 | TEMPERATURE: 98 F

## 2021-03-18 DIAGNOSIS — G47.00 INSOMNIA, UNSPECIFIED TYPE: ICD-10-CM

## 2021-03-18 DIAGNOSIS — G89.29 CHRONIC NONINTRACTABLE HEADACHE, UNSPECIFIED HEADACHE TYPE: ICD-10-CM

## 2021-03-18 DIAGNOSIS — M48.9 CERVICAL SPINE DISEASE: Primary | ICD-10-CM

## 2021-03-18 DIAGNOSIS — R51.9 CHRONIC NONINTRACTABLE HEADACHE, UNSPECIFIED HEADACHE TYPE: ICD-10-CM

## 2021-03-18 DIAGNOSIS — G89.29 CHRONIC BILATERAL LOW BACK PAIN WITHOUT SCIATICA: ICD-10-CM

## 2021-03-18 DIAGNOSIS — M54.50 CHRONIC BILATERAL LOW BACK PAIN WITHOUT SCIATICA: ICD-10-CM

## 2021-03-18 PROCEDURE — 99999 PR PBB SHADOW E&M-EST. PATIENT-LVL III: ICD-10-PCS | Mod: PBBFAC,,, | Performed by: FAMILY MEDICINE

## 2021-03-18 PROCEDURE — 99214 OFFICE O/P EST MOD 30 MIN: CPT | Mod: S$GLB,,, | Performed by: FAMILY MEDICINE

## 2021-03-18 PROCEDURE — 99999 PR PBB SHADOW E&M-EST. PATIENT-LVL III: CPT | Mod: PBBFAC,,, | Performed by: FAMILY MEDICINE

## 2021-03-18 PROCEDURE — 99214 PR OFFICE/OUTPT VISIT, EST, LEVL IV, 30-39 MIN: ICD-10-PCS | Mod: S$GLB,,, | Performed by: FAMILY MEDICINE

## 2021-03-18 RX ORDER — ZOLPIDEM TARTRATE 10 MG/1
TABLET ORAL
Qty: 30 TABLET | Refills: 5 | Status: SHIPPED | OUTPATIENT
Start: 2021-03-18 | End: 2021-06-15 | Stop reason: SDUPTHER

## 2021-03-18 RX ORDER — DICLOFENAC SODIUM 1 MG/ML
1 SOLUTION/ DROPS OPHTHALMIC 4 TIMES DAILY PRN
COMMUNITY
End: 2021-09-14

## 2021-03-18 RX ORDER — HYDROCODONE BITARTRATE AND ACETAMINOPHEN 10; 325 MG/1; MG/1
1 TABLET ORAL 4 TIMES DAILY PRN
Qty: 120 TABLET | Refills: 0 | Status: SHIPPED | OUTPATIENT
Start: 2021-03-18 | End: 2022-08-31 | Stop reason: SDUPTHER

## 2021-05-26 ENCOUNTER — TELEPHONE (OUTPATIENT)
Dept: ADMINISTRATIVE | Facility: HOSPITAL | Age: 56
End: 2021-05-26

## 2021-05-26 DIAGNOSIS — Z12.39 ENCOUNTER FOR SPECIAL SCREENING EXAMINATION FOR NEOPLASM OF BREAST: Primary | ICD-10-CM

## 2021-05-28 DIAGNOSIS — J47.9 BRONCHIECTASIS WITHOUT COMPLICATION: Primary | ICD-10-CM

## 2021-06-15 ENCOUNTER — OFFICE VISIT (OUTPATIENT)
Dept: FAMILY MEDICINE | Facility: CLINIC | Age: 56
End: 2021-06-15
Payer: COMMERCIAL

## 2021-06-15 ENCOUNTER — HOSPITAL ENCOUNTER (OUTPATIENT)
Dept: RADIOLOGY | Facility: HOSPITAL | Age: 56
Discharge: HOME | End: 2021-06-15
Attending: FAMILY MEDICINE
Payer: COMMERCIAL

## 2021-06-15 VITALS
BODY MASS INDEX: 20.88 KG/M2 | WEIGHT: 133.06 LBS | OXYGEN SATURATION: 97 % | TEMPERATURE: 99 F | HEART RATE: 68 BPM | RESPIRATION RATE: 12 BRPM | HEIGHT: 67 IN | DIASTOLIC BLOOD PRESSURE: 79 MMHG | SYSTOLIC BLOOD PRESSURE: 161 MMHG

## 2021-06-15 VITALS — BODY MASS INDEX: 21.59 KG/M2 | HEIGHT: 67 IN | WEIGHT: 137.56 LBS

## 2021-06-15 DIAGNOSIS — G89.29 CHRONIC BILATERAL LOW BACK PAIN WITHOUT SCIATICA: ICD-10-CM

## 2021-06-15 DIAGNOSIS — Z13.6 ENCOUNTER FOR LIPID SCREENING FOR CARDIOVASCULAR DISEASE: ICD-10-CM

## 2021-06-15 DIAGNOSIS — M48.9 CERVICAL SPINE DISEASE: ICD-10-CM

## 2021-06-15 DIAGNOSIS — G47.00 INSOMNIA, UNSPECIFIED TYPE: ICD-10-CM

## 2021-06-15 DIAGNOSIS — Z00.00 WELL ADULT EXAM: Primary | ICD-10-CM

## 2021-06-15 DIAGNOSIS — M54.50 CHRONIC BILATERAL LOW BACK PAIN WITHOUT SCIATICA: ICD-10-CM

## 2021-06-15 DIAGNOSIS — Z79.899 ENCOUNTER FOR LONG-TERM (CURRENT) USE OF MEDICATIONS: ICD-10-CM

## 2021-06-15 DIAGNOSIS — Z12.39 ENCOUNTER FOR SPECIAL SCREENING EXAMINATION FOR NEOPLASM OF BREAST: ICD-10-CM

## 2021-06-15 DIAGNOSIS — R10.30 LOWER ABDOMINAL PAIN: ICD-10-CM

## 2021-06-15 DIAGNOSIS — I10 HYPERTENSION, ESSENTIAL: ICD-10-CM

## 2021-06-15 DIAGNOSIS — Z13.220 ENCOUNTER FOR LIPID SCREENING FOR CARDIOVASCULAR DISEASE: ICD-10-CM

## 2021-06-15 PROBLEM — G43.909 MIGRAINE: Status: ACTIVE | Noted: 2021-06-15

## 2021-06-15 PROBLEM — G56.00 CARPAL TUNNEL SYNDROME: Status: ACTIVE | Noted: 2021-06-15

## 2021-06-15 PROBLEM — R51.9 HEADACHE: Status: ACTIVE | Noted: 2021-06-15

## 2021-06-15 PROBLEM — G43.909 MIGRAINE: Chronic | Status: ACTIVE | Noted: 2021-06-15

## 2021-06-15 PROCEDURE — 77067 SCR MAMMO BI INCL CAD: CPT | Mod: 26,,, | Performed by: RADIOLOGY

## 2021-06-15 PROCEDURE — 99396 PR PREVENTIVE VISIT,EST,40-64: ICD-10-PCS | Mod: S$GLB,,, | Performed by: FAMILY MEDICINE

## 2021-06-15 PROCEDURE — 99396 PREV VISIT EST AGE 40-64: CPT | Mod: S$GLB,,, | Performed by: FAMILY MEDICINE

## 2021-06-15 PROCEDURE — 77063 BREAST TOMOSYNTHESIS BI: CPT | Mod: 26,,, | Performed by: RADIOLOGY

## 2021-06-15 PROCEDURE — 99999 PR PBB SHADOW E&M-EST. PATIENT-LVL IV: ICD-10-PCS | Mod: PBBFAC,,, | Performed by: FAMILY MEDICINE

## 2021-06-15 PROCEDURE — 77067 SCR MAMMO BI INCL CAD: CPT | Mod: TC,PO

## 2021-06-15 PROCEDURE — 99999 PR PBB SHADOW E&M-EST. PATIENT-LVL IV: CPT | Mod: PBBFAC,,, | Performed by: FAMILY MEDICINE

## 2021-06-15 PROCEDURE — 77067 MAMMO DIGITAL SCREENING BILAT WITH TOMO: ICD-10-PCS | Mod: 26,,, | Performed by: RADIOLOGY

## 2021-06-15 PROCEDURE — 77063 MAMMO DIGITAL SCREENING BILAT WITH TOMO: ICD-10-PCS | Mod: 26,,, | Performed by: RADIOLOGY

## 2021-06-15 RX ORDER — HYDROCODONE BITARTRATE AND ACETAMINOPHEN 10; 325 MG/1; MG/1
1 TABLET ORAL 4 TIMES DAILY PRN
Qty: 120 TABLET | Refills: 0 | Status: CANCELLED | OUTPATIENT
Start: 2021-08-13

## 2021-06-15 RX ORDER — HYDROCODONE BITARTRATE AND ACETAMINOPHEN 10; 325 MG/1; MG/1
1 TABLET ORAL 4 TIMES DAILY PRN
Qty: 120 TABLET | Refills: 0 | Status: CANCELLED | OUTPATIENT
Start: 2021-07-14

## 2021-06-15 RX ORDER — PANTOPRAZOLE SODIUM 40 MG/1
40 TABLET, DELAYED RELEASE ORAL DAILY
COMMUNITY
End: 2023-12-19

## 2021-06-15 RX ORDER — ATENOLOL 50 MG/1
50 TABLET ORAL DAILY
Qty: 90 TABLET | Refills: 4 | Status: SHIPPED | OUTPATIENT
Start: 2021-06-15 | End: 2022-07-18

## 2021-06-15 RX ORDER — ZOLPIDEM TARTRATE 10 MG/1
TABLET ORAL
Qty: 30 TABLET | Refills: 5 | Status: SHIPPED | OUTPATIENT
Start: 2021-06-15 | End: 2021-09-27

## 2021-06-15 RX ORDER — HYDROCODONE BITARTRATE AND ACETAMINOPHEN 10; 325 MG/1; MG/1
1 TABLET ORAL 4 TIMES DAILY PRN
Qty: 120 TABLET | Refills: 0 | Status: SHIPPED | OUTPATIENT
Start: 2021-06-15 | End: 2021-09-14 | Stop reason: SDUPTHER

## 2021-06-21 LAB
CHOLEST SERPL-MSCNC: 156 MG/DL (ref 0–200)
HDLC SERPL-MCNC: 58 MG/DL (ref 35–70)
LDLC SERPL CALC-MCNC: 85 MG/DL (ref 0–100)
TRIGL SERPL-MCNC: 67 MG/DL (ref 0–150)
VLDL CHOLESTEROL: 13.4 MG/DL (ref 5–40)

## 2021-06-27 ENCOUNTER — PATIENT MESSAGE (OUTPATIENT)
Dept: FAMILY MEDICINE | Facility: CLINIC | Age: 56
End: 2021-06-27

## 2021-06-28 ENCOUNTER — PATIENT OUTREACH (OUTPATIENT)
Dept: ADMINISTRATIVE | Facility: HOSPITAL | Age: 56
End: 2021-06-28

## 2021-08-05 ENCOUNTER — PATIENT MESSAGE (OUTPATIENT)
Dept: FAMILY MEDICINE | Facility: CLINIC | Age: 56
End: 2021-08-05

## 2021-08-05 RX ORDER — LEVALBUTEROL TARTRATE 45 UG/1
AEROSOL, METERED ORAL
Qty: 15 G | Refills: 12 | Status: SHIPPED | OUTPATIENT
Start: 2021-08-05

## 2021-09-14 ENCOUNTER — OFFICE VISIT (OUTPATIENT)
Dept: FAMILY MEDICINE | Facility: CLINIC | Age: 56
End: 2021-09-14
Payer: COMMERCIAL

## 2021-09-14 VITALS
HEART RATE: 59 BPM | HEIGHT: 67 IN | BODY MASS INDEX: 21.3 KG/M2 | TEMPERATURE: 98 F | SYSTOLIC BLOOD PRESSURE: 128 MMHG | DIASTOLIC BLOOD PRESSURE: 80 MMHG | WEIGHT: 135.69 LBS

## 2021-09-14 DIAGNOSIS — G89.29 CHRONIC BILATERAL LOW BACK PAIN WITHOUT SCIATICA: ICD-10-CM

## 2021-09-14 DIAGNOSIS — M54.50 CHRONIC BILATERAL LOW BACK PAIN WITHOUT SCIATICA: ICD-10-CM

## 2021-09-14 DIAGNOSIS — Z79.899 ENCOUNTER FOR LONG-TERM (CURRENT) USE OF MEDICATIONS: ICD-10-CM

## 2021-09-14 DIAGNOSIS — M48.9 CERVICAL SPINE DISEASE: Primary | ICD-10-CM

## 2021-09-14 PROCEDURE — 99214 PR OFFICE/OUTPT VISIT, EST, LEVL IV, 30-39 MIN: ICD-10-PCS | Mod: S$GLB,,, | Performed by: FAMILY MEDICINE

## 2021-09-14 PROCEDURE — 99999 PR PBB SHADOW E&M-EST. PATIENT-LVL IV: ICD-10-PCS | Mod: PBBFAC,,, | Performed by: FAMILY MEDICINE

## 2021-09-14 PROCEDURE — 99214 OFFICE O/P EST MOD 30 MIN: CPT | Mod: S$GLB,,, | Performed by: FAMILY MEDICINE

## 2021-09-14 PROCEDURE — 99999 PR PBB SHADOW E&M-EST. PATIENT-LVL IV: CPT | Mod: PBBFAC,,, | Performed by: FAMILY MEDICINE

## 2021-09-14 RX ORDER — METHYLPREDNISOLONE 4 MG/1
TABLET ORAL
Qty: 21 TABLET | Refills: 0 | Status: SHIPPED | OUTPATIENT
Start: 2021-09-14 | End: 2021-12-09 | Stop reason: SDUPTHER

## 2021-09-14 RX ORDER — ETODOLAC 400 MG/1
400 TABLET, FILM COATED ORAL 2 TIMES DAILY
Qty: 60 TABLET | Refills: 1 | Status: SHIPPED | OUTPATIENT
Start: 2021-09-14 | End: 2021-10-30 | Stop reason: SDUPTHER

## 2021-09-14 RX ORDER — HYDROCODONE BITARTRATE AND ACETAMINOPHEN 10; 325 MG/1; MG/1
1 TABLET ORAL 4 TIMES DAILY PRN
Qty: 120 TABLET | Refills: 0 | Status: SHIPPED | OUTPATIENT
Start: 2021-09-14 | End: 2021-12-09 | Stop reason: SDUPTHER

## 2021-09-14 RX ORDER — TIZANIDINE HYDROCHLORIDE 2 MG/1
2 CAPSULE, GELATIN COATED ORAL 3 TIMES DAILY PRN
Qty: 45 CAPSULE | Refills: 0 | Status: SHIPPED | OUTPATIENT
Start: 2021-09-14 | End: 2023-11-02

## 2021-09-15 ENCOUNTER — TELEPHONE (OUTPATIENT)
Dept: FAMILY MEDICINE | Facility: CLINIC | Age: 56
End: 2021-09-15

## 2021-09-15 DIAGNOSIS — F41.1 ANXIETY AS ACUTE REACTION TO EXCEPTIONAL STRESS: Primary | ICD-10-CM

## 2021-09-15 DIAGNOSIS — F43.0 ANXIETY AS ACUTE REACTION TO EXCEPTIONAL STRESS: Primary | ICD-10-CM

## 2021-09-16 RX ORDER — ALPRAZOLAM 0.25 MG/1
0.25 TABLET ORAL 2 TIMES DAILY PRN
Qty: 45 TABLET | Refills: 0 | Status: SHIPPED | OUTPATIENT
Start: 2021-09-16 | End: 2021-12-09 | Stop reason: SDUPTHER

## 2021-09-16 RX ORDER — ESCITALOPRAM OXALATE 10 MG/1
10 TABLET ORAL DAILY
Qty: 30 TABLET | Refills: 11 | Status: SHIPPED | OUTPATIENT
Start: 2021-09-16 | End: 2021-12-09 | Stop reason: SINTOL

## 2021-12-09 ENCOUNTER — OFFICE VISIT (OUTPATIENT)
Dept: FAMILY MEDICINE | Facility: CLINIC | Age: 56
End: 2021-12-09
Payer: COMMERCIAL

## 2021-12-09 ENCOUNTER — TELEPHONE (OUTPATIENT)
Dept: FAMILY MEDICINE | Facility: CLINIC | Age: 56
End: 2021-12-09
Payer: COMMERCIAL

## 2021-12-09 VITALS
HEIGHT: 67 IN | SYSTOLIC BLOOD PRESSURE: 134 MMHG | WEIGHT: 134 LBS | HEART RATE: 60 BPM | TEMPERATURE: 98 F | BODY MASS INDEX: 21.03 KG/M2 | DIASTOLIC BLOOD PRESSURE: 76 MMHG

## 2021-12-09 DIAGNOSIS — M48.9 CERVICAL SPINE DISEASE: Primary | ICD-10-CM

## 2021-12-09 DIAGNOSIS — F41.9 ANXIETY: ICD-10-CM

## 2021-12-09 DIAGNOSIS — G89.29 CHRONIC BILATERAL LOW BACK PAIN WITHOUT SCIATICA: ICD-10-CM

## 2021-12-09 DIAGNOSIS — M54.50 CHRONIC BILATERAL LOW BACK PAIN WITHOUT SCIATICA: ICD-10-CM

## 2021-12-09 DIAGNOSIS — Z79.899 ENCOUNTER FOR LONG-TERM (CURRENT) USE OF MEDICATIONS: ICD-10-CM

## 2021-12-09 PROCEDURE — 99214 OFFICE O/P EST MOD 30 MIN: CPT | Mod: S$GLB,,, | Performed by: FAMILY MEDICINE

## 2021-12-09 PROCEDURE — 99999 PR PBB SHADOW E&M-EST. PATIENT-LVL IV: ICD-10-PCS | Mod: PBBFAC,,, | Performed by: FAMILY MEDICINE

## 2021-12-09 PROCEDURE — 99999 PR PBB SHADOW E&M-EST. PATIENT-LVL IV: CPT | Mod: PBBFAC,,, | Performed by: FAMILY MEDICINE

## 2021-12-09 PROCEDURE — 99214 PR OFFICE/OUTPT VISIT, EST, LEVL IV, 30-39 MIN: ICD-10-PCS | Mod: S$GLB,,, | Performed by: FAMILY MEDICINE

## 2021-12-09 RX ORDER — ETODOLAC 400 MG/1
TABLET, FILM COATED ORAL
Qty: 180 TABLET | Refills: 4 | Status: SHIPPED | OUTPATIENT
Start: 2021-12-09 | End: 2022-06-07 | Stop reason: SDUPTHER

## 2021-12-09 RX ORDER — ALPRAZOLAM 0.25 MG/1
0.25 TABLET ORAL 2 TIMES DAILY PRN
Qty: 45 TABLET | Refills: 0 | Status: SHIPPED | OUTPATIENT
Start: 2021-12-09 | End: 2022-08-31 | Stop reason: SDUPTHER

## 2021-12-09 RX ORDER — HYDROCODONE BITARTRATE AND ACETAMINOPHEN 10; 325 MG/1; MG/1
1 TABLET ORAL 4 TIMES DAILY PRN
Qty: 120 TABLET | Refills: 0 | Status: SHIPPED | OUTPATIENT
Start: 2021-12-09 | End: 2022-03-09 | Stop reason: SDUPTHER

## 2021-12-09 RX ORDER — METHYLPREDNISOLONE 4 MG/1
TABLET ORAL
Qty: 21 TABLET | Refills: 0 | Status: SHIPPED | OUTPATIENT
Start: 2021-12-09 | End: 2022-03-09

## 2022-01-20 ENCOUNTER — PATIENT MESSAGE (OUTPATIENT)
Dept: FAMILY MEDICINE | Facility: CLINIC | Age: 57
End: 2022-01-20
Payer: COMMERCIAL

## 2022-01-20 ENCOUNTER — TELEPHONE (OUTPATIENT)
Dept: FAMILY MEDICINE | Facility: CLINIC | Age: 57
End: 2022-01-20
Payer: COMMERCIAL

## 2022-01-20 NOTE — TELEPHONE ENCOUNTER
----- Message from Mine Pickens sent at 1/20/2022  7:35 AM CST -----  Type:  Same Day Appointment Request    Caller is requesting a same day appointment.  Caller declined first available appointment listed below.    Name of Caller: pt   When is the first available appointment? 02/22  Symptoms: poss bronchitis/congestion, deep cough/neg covid test over a week ago/ headaches  Best Call Back Number: [phone]  Additional Information: pt wants to come in after 1130 if possible / has another appt earlier

## 2022-01-20 NOTE — TELEPHONE ENCOUNTER
Attempted to reach patient by phone, no answer, so I sent patient [employer] message regarding scheduling an appointment.

## 2022-03-09 ENCOUNTER — OFFICE VISIT (OUTPATIENT)
Dept: FAMILY MEDICINE | Facility: CLINIC | Age: 57
End: 2022-03-09
Payer: COMMERCIAL

## 2022-03-09 VITALS — SYSTOLIC BLOOD PRESSURE: 126 MMHG | DIASTOLIC BLOOD PRESSURE: 82 MMHG

## 2022-03-09 DIAGNOSIS — M48.9 CERVICAL SPINE DISEASE: ICD-10-CM

## 2022-03-09 DIAGNOSIS — M54.50 CHRONIC BILATERAL LOW BACK PAIN WITHOUT SCIATICA: ICD-10-CM

## 2022-03-09 DIAGNOSIS — G89.29 CHRONIC BILATERAL LOW BACK PAIN WITHOUT SCIATICA: ICD-10-CM

## 2022-03-09 DIAGNOSIS — G47.00 INSOMNIA, UNSPECIFIED TYPE: ICD-10-CM

## 2022-03-09 DIAGNOSIS — Z79.899 ENCOUNTER FOR LONG-TERM (CURRENT) USE OF MEDICATIONS: ICD-10-CM

## 2022-03-09 PROCEDURE — 1159F PR MEDICATION LIST DOCUMENTED IN MEDICAL RECORD: ICD-10-PCS | Mod: CPTII,95,, | Performed by: FAMILY MEDICINE

## 2022-03-09 PROCEDURE — 1160F RVW MEDS BY RX/DR IN RCRD: CPT | Mod: CPTII,95,, | Performed by: FAMILY MEDICINE

## 2022-03-09 PROCEDURE — 3074F SYST BP LT 130 MM HG: CPT | Mod: CPTII,95,, | Performed by: FAMILY MEDICINE

## 2022-03-09 PROCEDURE — 99442 PR PHYSICIAN TELEPHONE EVALUATION 11-20 MIN: ICD-10-PCS | Mod: 95,,, | Performed by: FAMILY MEDICINE

## 2022-03-09 PROCEDURE — 3079F PR MOST RECENT DIASTOLIC BLOOD PRESSURE 80-89 MM HG: ICD-10-PCS | Mod: CPTII,95,, | Performed by: FAMILY MEDICINE

## 2022-03-09 PROCEDURE — 1159F MED LIST DOCD IN RCRD: CPT | Mod: CPTII,95,, | Performed by: FAMILY MEDICINE

## 2022-03-09 PROCEDURE — 99442 PR PHYSICIAN TELEPHONE EVALUATION 11-20 MIN: CPT | Mod: 95,,, | Performed by: FAMILY MEDICINE

## 2022-03-09 PROCEDURE — 3079F DIAST BP 80-89 MM HG: CPT | Mod: CPTII,95,, | Performed by: FAMILY MEDICINE

## 2022-03-09 PROCEDURE — 1160F PR REVIEW ALL MEDS BY PRESCRIBER/CLIN PHARMACIST DOCUMENTED: ICD-10-PCS | Mod: CPTII,95,, | Performed by: FAMILY MEDICINE

## 2022-03-09 PROCEDURE — 3074F PR MOST RECENT SYSTOLIC BLOOD PRESSURE < 130 MM HG: ICD-10-PCS | Mod: CPTII,95,, | Performed by: FAMILY MEDICINE

## 2022-03-09 RX ORDER — HYDROCODONE BITARTRATE AND ACETAMINOPHEN 10; 325 MG/1; MG/1
1 TABLET ORAL 4 TIMES DAILY PRN
Qty: 120 TABLET | Refills: 0 | Status: SHIPPED | OUTPATIENT
Start: 2022-03-09 | End: 2022-06-07 | Stop reason: SDUPTHER

## 2022-03-09 RX ORDER — ZOLPIDEM TARTRATE 10 MG/1
TABLET ORAL
Qty: 30 TABLET | Refills: 5 | Status: SHIPPED | OUTPATIENT
Start: 2022-03-09 | End: 2022-06-07 | Stop reason: SDUPTHER

## 2022-03-09 NOTE — ASSESSMENT & PLAN NOTE
Refill Ambien.  Reviewed .Discussed insomnia condition course.  Advised of first-line medications for this condition.  Also discussed sleep hygiene.  Information was given below.  Good sleep habits (sometimes referred to as sleep hygiene) can help you get a good nights sleep.    Some habits that can improve your sleep health:  -Be consistent. Go to bed at the same time each night and get up at the same time each morning, including on the weekends  -Make sure your bedroom is quiet, dark, relaxing, and at a comfortable temperature  -Remove electronic devices, such as TVs, computers, and smart phones, from the bedroom  -Avoid large meals, caffeine, and alcohol before bedtime  -Get some exercise. Being physically active during the day can help you fall asleep more easily at night.

## 2022-03-09 NOTE — ASSESSMENT & PLAN NOTE
March 2022:  No change in HPI.  Continue current meds.  Follow-up with neuro surgery.  Follow-up pain management.  Refill sparing use Norco every three months.The patient was checked in the [facility] [employer] [facility] [employer]'s  Prescription Monitoring Program. There appears to be no incongruities with the patient's verbalized history.   Avoid heavy lifting.

## 2022-03-09 NOTE — ASSESSMENT & PLAN NOTE
Spoke with patient by phone today.  She is doing well needing refill on her pain medication.  Patient is taking anti-inflammatory once daily.  She is scheduled for steroid injection with pain management soon.  Requesting records.The patient was checked in the [facility] Board of Pharmacy's  Prescription Monitoring Program. There appears to be no incongruities with the patient's verbalized history.

## 2022-03-09 NOTE — PATIENT INSTRUCTIONS
Follow up in about 3 months (around 6/9/2022), or if symptoms worsen or fail to improve, for Pain med Refill.     Dear patient,   As a result of recent federal legislation (The Federal Cures Act), you may receive lab or pathology results from your visit in your [facility] account before your physician is able to contact you. Your physician or their representative will relay the results to you with their recommendations at their soonest availability.     If no improvement in symptoms or symptoms worsen, please be advised to call MD, follow-up at clinic and/or go to ER if becomes severe.    Buzz Gee M.D.        We Offer TELEHEALTH & Same Day Appointments!   Book your Telehealth appointment with me through my nurse or   Clinic appointments on [facility]!    [address] [place] [address]    Office: [phone]   FAX: [phone]    Check out my Facebook Page and Follow Me at: [email or web address]    Check out my website at [email or web address] by clicking on: [email or web address]    To Schedule appointments [email or web address] go to [facility]: [email or web address]
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
## 2022-05-31 ENCOUNTER — PATIENT MESSAGE (OUTPATIENT)
Dept: FAMILY MEDICINE | Facility: CLINIC | Age: 57
End: 2022-05-31
Payer: COMMERCIAL

## 2022-06-07 ENCOUNTER — OFFICE VISIT (OUTPATIENT)
Dept: FAMILY MEDICINE | Facility: CLINIC | Age: 57
End: 2022-06-07
Payer: COMMERCIAL

## 2022-06-07 VITALS
RESPIRATION RATE: 16 BRPM | SYSTOLIC BLOOD PRESSURE: 136 MMHG | TEMPERATURE: 97 F | HEIGHT: 67 IN | BODY MASS INDEX: 21.14 KG/M2 | OXYGEN SATURATION: 98 % | DIASTOLIC BLOOD PRESSURE: 82 MMHG | WEIGHT: 134.69 LBS | HEART RATE: 51 BPM

## 2022-06-07 DIAGNOSIS — Z13.6 ENCOUNTER FOR LIPID SCREENING FOR CARDIOVASCULAR DISEASE: ICD-10-CM

## 2022-06-07 DIAGNOSIS — Z13.220 ENCOUNTER FOR LIPID SCREENING FOR CARDIOVASCULAR DISEASE: ICD-10-CM

## 2022-06-07 DIAGNOSIS — Z79.899 ENCOUNTER FOR LONG-TERM (CURRENT) USE OF MEDICATIONS: ICD-10-CM

## 2022-06-07 DIAGNOSIS — M48.9 CERVICAL SPINE DISEASE: ICD-10-CM

## 2022-06-07 DIAGNOSIS — Z12.31 ENCOUNTER FOR SCREENING MAMMOGRAM FOR BREAST CANCER: ICD-10-CM

## 2022-06-07 DIAGNOSIS — G47.00 INSOMNIA, UNSPECIFIED TYPE: ICD-10-CM

## 2022-06-07 DIAGNOSIS — M54.50 CHRONIC BILATERAL LOW BACK PAIN WITHOUT SCIATICA: Primary | ICD-10-CM

## 2022-06-07 DIAGNOSIS — G89.29 CHRONIC BILATERAL LOW BACK PAIN WITHOUT SCIATICA: Primary | ICD-10-CM

## 2022-06-07 PROCEDURE — 1160F RVW MEDS BY RX/DR IN RCRD: CPT | Mod: CPTII,S$GLB,, | Performed by: FAMILY MEDICINE

## 2022-06-07 PROCEDURE — 1159F MED LIST DOCD IN RCRD: CPT | Mod: CPTII,S$GLB,, | Performed by: FAMILY MEDICINE

## 2022-06-07 PROCEDURE — 3079F DIAST BP 80-89 MM HG: CPT | Mod: CPTII,S$GLB,, | Performed by: FAMILY MEDICINE

## 2022-06-07 PROCEDURE — 3075F PR MOST RECENT SYSTOLIC BLOOD PRESS GE 130-139MM HG: ICD-10-PCS | Mod: CPTII,S$GLB,, | Performed by: FAMILY MEDICINE

## 2022-06-07 PROCEDURE — 3008F PR BODY MASS INDEX (BMI) DOCUMENTED: ICD-10-PCS | Mod: CPTII,S$GLB,, | Performed by: FAMILY MEDICINE

## 2022-06-07 PROCEDURE — 99999 PR PBB SHADOW E&M-EST. PATIENT-LVL V: CPT | Mod: PBBFAC,,, | Performed by: FAMILY MEDICINE

## 2022-06-07 PROCEDURE — 3008F BODY MASS INDEX DOCD: CPT | Mod: CPTII,S$GLB,, | Performed by: FAMILY MEDICINE

## 2022-06-07 PROCEDURE — 99214 OFFICE O/P EST MOD 30 MIN: CPT | Mod: S$GLB,,, | Performed by: FAMILY MEDICINE

## 2022-06-07 PROCEDURE — 99214 PR OFFICE/OUTPT VISIT, EST, LEVL IV, 30-39 MIN: ICD-10-PCS | Mod: S$GLB,,, | Performed by: FAMILY MEDICINE

## 2022-06-07 PROCEDURE — 1159F PR MEDICATION LIST DOCUMENTED IN MEDICAL RECORD: ICD-10-PCS | Mod: CPTII,S$GLB,, | Performed by: FAMILY MEDICINE

## 2022-06-07 PROCEDURE — 3079F PR MOST RECENT DIASTOLIC BLOOD PRESSURE 80-89 MM HG: ICD-10-PCS | Mod: CPTII,S$GLB,, | Performed by: FAMILY MEDICINE

## 2022-06-07 PROCEDURE — 1160F PR REVIEW ALL MEDS BY PRESCRIBER/CLIN PHARMACIST DOCUMENTED: ICD-10-PCS | Mod: CPTII,S$GLB,, | Performed by: FAMILY MEDICINE

## 2022-06-07 PROCEDURE — 99999 PR PBB SHADOW E&M-EST. PATIENT-LVL V: ICD-10-PCS | Mod: PBBFAC,,, | Performed by: FAMILY MEDICINE

## 2022-06-07 PROCEDURE — 3075F SYST BP GE 130 - 139MM HG: CPT | Mod: CPTII,S$GLB,, | Performed by: FAMILY MEDICINE

## 2022-06-07 RX ORDER — ETODOLAC 400 MG/1
TABLET, FILM COATED ORAL
Qty: 180 TABLET | Refills: 4 | Status: SHIPPED | OUTPATIENT
Start: 2022-06-07 | End: 2023-08-02 | Stop reason: ALTCHOICE

## 2022-06-07 RX ORDER — ZOLPIDEM TARTRATE 10 MG/1
TABLET ORAL
Qty: 30 TABLET | Refills: 5 | Status: SHIPPED | OUTPATIENT
Start: 2022-06-07 | End: 2022-08-31 | Stop reason: SDUPTHER

## 2022-06-07 RX ORDER — HYDROCODONE BITARTRATE AND ACETAMINOPHEN 10; 325 MG/1; MG/1
1 TABLET ORAL 4 TIMES DAILY PRN
Qty: 120 TABLET | Refills: 0 | Status: CANCELLED | OUTPATIENT
Start: 2022-06-07

## 2022-06-07 RX ORDER — HYDROCODONE BITARTRATE AND ACETAMINOPHEN 10; 325 MG/1; MG/1
1 TABLET ORAL 4 TIMES DAILY PRN
Qty: 120 TABLET | Refills: 0 | Status: SHIPPED | OUTPATIENT
Start: 2022-06-07 | End: 2022-08-31 | Stop reason: SDUPTHER

## 2022-06-07 NOTE — PATIENT INSTRUCTIONS
Follow up in about 3 months (around 9/7/2022) for pain med refill.     Dear patient,   As a result of recent federal legislation (The Federal Cures Act), you may receive lab or pathology results from your visit in your [facility] account before your physician is able to contact you. Your physician or their representative will relay the results to you with their recommendations at their soonest availability.     If no improvement in symptoms or symptoms worsen, please be advised to call MD, follow-up at clinic and/or go to ER if becomes severe.    Buzz Gee M.D.        We Offer TELEHEALTH & Same Day Appointments!   Book your Telehealth appointment with me through my nurse or   Clinic appointments on [employer]!    [address] [place] [address]    Office: [phone]   FAX: [phone]    Check out my Facebook Page and Follow Me at: [email or web address]    Check out my website at [employer] [email or web address] clicking on: [email or web address]    To Schedule appointments online, go to [email or web address]: [email or web address]

## 2022-06-07 NOTE — ASSESSMENT & PLAN NOTE
June 2022:  Patient continues to follow with Neurosurgery and Pain Management.  March 2022:  No change in HPI.  Continue current meds.  Follow-up with neuro surgery.  Follow-up pain management.  Refill sparing use Norco every three months.The patient was checked in the [facility]'s  Prescription Monitoring Program. There appears to be no incongruities with the patient's verbalized history.   Avoid heavy lifting.

## 2022-06-07 NOTE — ASSESSMENT & PLAN NOTE
Will reviewed labs at next visit in three months.Complete history and physical was completed today.  Complete and thorough medication reconciliation was performed.  Discussed risks and benefits of medications.  Advised patient on orders and health maintenance.  We discussed old records and old labs if available.  Will request any records not available through epic.  Continue current medications listed on your summary sheet.
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
## 2022-07-18 DIAGNOSIS — I10 HYPERTENSION, ESSENTIAL: ICD-10-CM

## 2022-07-18 RX ORDER — ATENOLOL 50 MG/1
TABLET ORAL
Qty: 90 TABLET | Refills: 3 | Status: SHIPPED | OUTPATIENT
Start: 2022-07-18 | End: 2022-11-21 | Stop reason: ALTCHOICE

## 2022-07-18 NOTE — TELEPHONE ENCOUNTER
No new care gaps identified.  [facility] Embedded Care Gaps. Reference number: [id number]. 7/18/2022   5:45:40 PM CDT

## 2022-07-18 NOTE — TELEPHONE ENCOUNTER
Refill Decision Note   Wendie Leandro  is requesting a refill authorization.  Brief Assessment and Rationale for Refill:  Approve     Medication Therapy Plan:       Medication Reconciliation Completed: No   Comments:     No Care Gaps recommended.     Note composed:6:05 PM 07/18/2022

## 2022-08-30 ENCOUNTER — TELEPHONE (OUTPATIENT)
Dept: FAMILY MEDICINE | Facility: CLINIC | Age: 57
End: 2022-08-30
Payer: COMMERCIAL

## 2022-08-31 ENCOUNTER — HOSPITAL ENCOUNTER (OUTPATIENT)
Dept: RADIOLOGY | Facility: HOSPITAL | Age: 57
Discharge: HOME | End: 2022-08-31
Attending: FAMILY MEDICINE
Payer: COMMERCIAL

## 2022-08-31 ENCOUNTER — OFFICE VISIT (OUTPATIENT)
Dept: FAMILY MEDICINE | Facility: CLINIC | Age: 57
End: 2022-08-31
Payer: COMMERCIAL

## 2022-08-31 VITALS
TEMPERATURE: 98 F | HEART RATE: 65 BPM | RESPIRATION RATE: 16 BRPM | SYSTOLIC BLOOD PRESSURE: 130 MMHG | WEIGHT: 134.69 LBS | BODY MASS INDEX: 21.14 KG/M2 | OXYGEN SATURATION: 96 % | DIASTOLIC BLOOD PRESSURE: 80 MMHG | WEIGHT: 134.69 LBS | HEIGHT: 67 IN | BODY MASS INDEX: 21.14 KG/M2 | HEIGHT: 67 IN

## 2022-08-31 DIAGNOSIS — M54.50 CHRONIC BILATERAL LOW BACK PAIN WITHOUT SCIATICA: Primary | ICD-10-CM

## 2022-08-31 DIAGNOSIS — G47.00 INSOMNIA, UNSPECIFIED TYPE: ICD-10-CM

## 2022-08-31 DIAGNOSIS — G89.29 CHRONIC BILATERAL LOW BACK PAIN WITHOUT SCIATICA: Primary | ICD-10-CM

## 2022-08-31 DIAGNOSIS — Z12.31 ENCOUNTER FOR SCREENING MAMMOGRAM FOR BREAST CANCER: ICD-10-CM

## 2022-08-31 DIAGNOSIS — M48.9 CERVICAL SPINE DISEASE: ICD-10-CM

## 2022-08-31 DIAGNOSIS — I10 HYPERTENSION, ESSENTIAL: ICD-10-CM

## 2022-08-31 DIAGNOSIS — Z79.899 ENCOUNTER FOR LONG-TERM (CURRENT) USE OF MEDICATIONS: ICD-10-CM

## 2022-08-31 DIAGNOSIS — F41.9 ANXIETY: ICD-10-CM

## 2022-08-31 PROCEDURE — 77067 SCR MAMMO BI INCL CAD: CPT | Mod: 26,,, | Performed by: RADIOLOGY

## 2022-08-31 PROCEDURE — 77063 BREAST TOMOSYNTHESIS BI: CPT | Mod: TC,PO

## 2022-08-31 PROCEDURE — 99214 PR OFFICE/OUTPT VISIT, EST, LEVL IV, 30-39 MIN: ICD-10-PCS | Mod: S$GLB,,, | Performed by: FAMILY MEDICINE

## 2022-08-31 PROCEDURE — 1160F RVW MEDS BY RX/DR IN RCRD: CPT | Mod: CPTII,S$GLB,, | Performed by: FAMILY MEDICINE

## 2022-08-31 PROCEDURE — 3079F DIAST BP 80-89 MM HG: CPT | Mod: CPTII,S$GLB,, | Performed by: FAMILY MEDICINE

## 2022-08-31 PROCEDURE — 77063 BREAST TOMOSYNTHESIS BI: CPT | Mod: 26,,, | Performed by: RADIOLOGY

## 2022-08-31 PROCEDURE — 3008F PR BODY MASS INDEX (BMI) DOCUMENTED: ICD-10-PCS | Mod: CPTII,S$GLB,, | Performed by: FAMILY MEDICINE

## 2022-08-31 PROCEDURE — 99214 OFFICE O/P EST MOD 30 MIN: CPT | Mod: S$GLB,,, | Performed by: FAMILY MEDICINE

## 2022-08-31 PROCEDURE — 3079F PR MOST RECENT DIASTOLIC BLOOD PRESSURE 80-89 MM HG: ICD-10-PCS | Mod: CPTII,S$GLB,, | Performed by: FAMILY MEDICINE

## 2022-08-31 PROCEDURE — 77063 MAMMO DIGITAL SCREENING BILAT WITH TOMO: ICD-10-PCS | Mod: 26,,, | Performed by: RADIOLOGY

## 2022-08-31 PROCEDURE — 1159F MED LIST DOCD IN RCRD: CPT | Mod: CPTII,S$GLB,, | Performed by: FAMILY MEDICINE

## 2022-08-31 PROCEDURE — 77067 MAMMO DIGITAL SCREENING BILAT WITH TOMO: ICD-10-PCS | Mod: 26,,, | Performed by: RADIOLOGY

## 2022-08-31 PROCEDURE — 3075F PR MOST RECENT SYSTOLIC BLOOD PRESS GE 130-139MM HG: ICD-10-PCS | Mod: CPTII,S$GLB,, | Performed by: FAMILY MEDICINE

## 2022-08-31 PROCEDURE — 1160F PR REVIEW ALL MEDS BY PRESCRIBER/CLIN PHARMACIST DOCUMENTED: ICD-10-PCS | Mod: CPTII,S$GLB,, | Performed by: FAMILY MEDICINE

## 2022-08-31 PROCEDURE — 99999 PR PBB SHADOW E&M-EST. PATIENT-LVL V: CPT | Mod: PBBFAC,,, | Performed by: FAMILY MEDICINE

## 2022-08-31 PROCEDURE — 3008F BODY MASS INDEX DOCD: CPT | Mod: CPTII,S$GLB,, | Performed by: FAMILY MEDICINE

## 2022-08-31 PROCEDURE — 99999 PR PBB SHADOW E&M-EST. PATIENT-LVL V: ICD-10-PCS | Mod: PBBFAC,,, | Performed by: FAMILY MEDICINE

## 2022-08-31 PROCEDURE — 1159F PR MEDICATION LIST DOCUMENTED IN MEDICAL RECORD: ICD-10-PCS | Mod: CPTII,S$GLB,, | Performed by: FAMILY MEDICINE

## 2022-08-31 PROCEDURE — 3075F SYST BP GE 130 - 139MM HG: CPT | Mod: CPTII,S$GLB,, | Performed by: FAMILY MEDICINE

## 2022-08-31 RX ORDER — ALPRAZOLAM 0.25 MG/1
0.25 TABLET ORAL 2 TIMES DAILY PRN
Qty: 45 TABLET | Refills: 0 | Status: SHIPPED | OUTPATIENT
Start: 2022-08-31 | End: 2023-05-05 | Stop reason: SDUPTHER

## 2022-08-31 RX ORDER — HYDROCODONE BITARTRATE AND ACETAMINOPHEN 10; 325 MG/1; MG/1
1 TABLET ORAL 4 TIMES DAILY PRN
Qty: 120 TABLET | Refills: 0 | Status: SHIPPED | OUTPATIENT
Start: 2022-08-31 | End: 2022-11-21 | Stop reason: SDUPTHER

## 2022-08-31 RX ORDER — ZOLPIDEM TARTRATE 10 MG/1
TABLET ORAL
Qty: 30 TABLET | Refills: 5 | Status: SHIPPED | OUTPATIENT
Start: 2022-08-31 | End: 2022-10-14 | Stop reason: SDUPTHER

## 2022-08-31 RX ORDER — LOSARTAN POTASSIUM AND HYDROCHLOROTHIAZIDE 12.5; 5 MG/1; MG/1
1 TABLET ORAL DAILY
COMMUNITY
Start: 2022-08-24 | End: 2023-11-02 | Stop reason: SDUPTHER

## 2022-08-31 NOTE — PATIENT INSTRUCTIONS
Follow up in about 3 months (around 11/30/2022) for pain med refill.     Dear patient,   As a result of recent federal legislation (The Federal Cures Act), you may receive lab or pathology results from your visit in your [facility] account before your physician is able to contact you. Your physician or their representative will relay the results to you with their recommendations at their soonest availability.     If no improvement in symptoms or symptoms worsen, please be advised to call MD, follow-up at clinic and/or go to ER if becomes severe.    Buzz Gee M.D.        We Offer TELEHEALTH & Same Day Appointments!   Book your Telehealth appointment with me through my nurse or   Clinic appointments on [employer]!    [address] [place] [address]    Office: [phone]   FAX: [phone]    Check out my Facebook Page and Follow Me at: [email or web address]    Check out my website at [employer] by clicking on: [email or web address]    To Schedule appointments online, go to [email or web address]: [email or web address]

## 2022-08-31 NOTE — PROGRESS NOTES
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 separately reported)/communicating results to the patient/family/caregiver, and/or care coordination (not separately reported).    I have Reviewed and summarized old records.  I have performed thorough medication reconciliation today and discussed risk and benefits of medications.  I have reviewed labs and discussed with patient.  All questions were answered.  I am requesting old records and will review them once they are available.Neurosurgery [facility] Neuromedical Dr. Burris     Cardiology Dr. Law - CT ca 10.6    I have signed for the following orders AND/OR meds.  No orders of the defined types were placed in this encounter.    Medications Ordered This Encounter   Medications    ALPRAZolam (XANAX) 0.25 MG tablet     Sig: Take 1 tablet (0.25 mg total) by mouth 2 (two) times daily as needed for Anxiety.     Dispense:  45 tablet     Refill:  0    HYDROcodone-acetaminophen (NORCO)  mg per tablet     Sig: Take 1 tablet by mouth 4 (four) times daily as needed for Pain.     Dispense:  120 tablet     Refill:  0     Quantity prescribed more than 7 day supply? Yes, quantity medically necessary    zolpidem (AMBIEN) 10 mg Tab     Sig: TAKE 1 TABLET BY MOUTH EVERY NIGHT AT BEDTIME     Dispense:   30 tablet     Refill:  5          Follow up in about 3 months (around 11/30/2022) for pain med refill.  Future Appointments       Date Provider Specialty Appt Notes    11/21/2022 Buzz Gee MD Family Medicine 3 month pain med refill            If no improvement in symptoms or symptoms worsen, advised to call/follow-up at clinic or go to ER. Patient voiced understanding and all questions/concerns were addressed.   DISCLAIMER: This note was compiled by using a speech recognition dictation system and therefore please be aware that typographical / speech recognition errors can and do occur.  Please contact me if you see any errors specifically.    Buzz Gee M.D.       Office: [phone] [address] [place] [address]  FAX: [phone]

## 2022-09-01 PROBLEM — I10 HYPERTENSION, ESSENTIAL: Chronic | Status: ACTIVE | Noted: 2021-06-15

## 2022-09-01 PROBLEM — M19.90 OSTEOARTHRITIS: Status: ACTIVE | Noted: 2022-08-12

## 2022-09-01 PROBLEM — E78.5 HYPERLIPIDEMIA: Status: ACTIVE | Noted: 2022-08-24

## 2022-09-01 PROBLEM — Z82.49 FAMILY HISTORY OF CORONARY ARTERIOSCLEROSIS: Status: ACTIVE | Noted: 2022-08-12

## 2022-09-01 PROBLEM — I34.1 MITRAL VALVE PROLAPSE: Status: ACTIVE | Noted: 2022-08-12

## 2022-09-01 PROBLEM — I73.9 PERIPHERAL VASCULAR DISEASE: Status: ACTIVE | Noted: 2022-08-24

## 2022-09-01 NOTE — ASSESSMENT & PLAN NOTE
We discussed blood work.  Patient also reviewed with Cardiology.Complete history and physical was completed today.  Complete and thorough medication reconciliation was performed.  Discussed risks and benefits of medications.  Advised patient on orders and health maintenance.  We discussed old records and old labs if available.  Will request any records not available through epic.  Continue current medications listed on your summary sheet.

## 2022-09-01 NOTE — PROGRESS NOTES
Normal mammogram, repeat in 1 year, result released through [facility].  Please call the patient if not enrolled with my chart. [phone]   Shingles Vaccine(1 of 2) Never done  COVID-19 Vaccine(3 - Booster for Pfizer series) due on 07/15/2021  Influenza Vaccine(1) due on 09/01/2022

## 2022-09-01 NOTE — ASSESSMENT & PLAN NOTE
Requesting records from Cardiology.  I recommend discontinuing the atenolol if symptoms continue to occur and monitoring blood pressure for less than 140/90.  Follow-up with Cardiology.  Continue losartan hydrochlorothiazide.Counseled on importance of hypertension disease course, I recommend ongoing Education for DASH-diet and exercise.  Counseled on medication regimen importance of treating high blood pressure.  Please be advised of risk of untreated blood pressure as discussed.  Please advised of ER precautions were given for symptoms of hypertensive urgency and emergency.

## 2022-09-01 NOTE — ASSESSMENT & PLAN NOTE
August 2022:  Patient has close follow-up scheduled with pain management and neuro surgery.  June 2022:  Patient continues to follow with Neurosurgery and Pain Management.  March 2022:  No change in HPI.  Continue current meds.  Follow-up with neuro surgery.  Follow-up pain management.  Refill sparing use Norco every three months.The patient was checked in the [facility] Board of Pharmacy's  Prescription Monitoring Program. There appears to be no incongruities with the patient's verbalized history.   Avoid heavy lifting.

## 2022-09-01 NOTE — ASSESSMENT & PLAN NOTE
Continue Xanax.The patient was checked in the [facility] Board of Pharmacy's  Prescription Monitoring Program. There appears to be no incongruities with the patient's verbalized history.   Please be advised of condition course.  SNRI/SSRI is first-line treatment for this condition.  Please be advised of the risk of discontinuing this medication without tapering/contacting MD.  Patient has been advised of side effects, and all questions were answered.  Patient voiced understanding.  Patient will follow up routinely and notify us if having any side effects or worsening or persistent symptoms.  ER precautions were given. Antidepressant/Antianxiety Medication Initiation:  Patient informed of risks, benefits, and potential side effects of medication and accepts informed consent.  Common side effects include nausea, fatigue, headache, insomnia, etc see medication insert for complete side effect profile.  Most importantly be advised of the possibility of new or worsening suicidal thoughts/depression/anxiety etcetera.  Please be advised to stop the medication immediately and seek urgent treatment if this occurs.  Therefore please do not to abruptly discontinue medication without physician guidance except in cases of sudden onset or worsening of SI.

## 2022-09-22 LAB — BMD RECOMMENDATION EXT: NORMAL

## 2022-11-21 ENCOUNTER — OFFICE VISIT (OUTPATIENT)
Dept: FAMILY MEDICINE | Facility: CLINIC | Age: 57
End: 2022-11-21
Payer: COMMERCIAL

## 2022-11-21 VITALS
TEMPERATURE: 97 F | DIASTOLIC BLOOD PRESSURE: 82 MMHG | HEIGHT: 67 IN | OXYGEN SATURATION: 99 % | SYSTOLIC BLOOD PRESSURE: 128 MMHG | HEART RATE: 74 BPM | WEIGHT: 132 LBS | BODY MASS INDEX: 20.72 KG/M2

## 2022-11-21 DIAGNOSIS — Z79.899 ENCOUNTER FOR LONG-TERM (CURRENT) USE OF MEDICATIONS: ICD-10-CM

## 2022-11-21 DIAGNOSIS — M54.50 CHRONIC BILATERAL LOW BACK PAIN WITHOUT SCIATICA: Primary | ICD-10-CM

## 2022-11-21 DIAGNOSIS — G47.00 INSOMNIA, UNSPECIFIED TYPE: ICD-10-CM

## 2022-11-21 DIAGNOSIS — M48.9 CERVICAL SPINE DISEASE: ICD-10-CM

## 2022-11-21 DIAGNOSIS — G89.29 CHRONIC BILATERAL LOW BACK PAIN WITHOUT SCIATICA: Primary | ICD-10-CM

## 2022-11-21 DIAGNOSIS — I10 HYPERTENSION, ESSENTIAL: ICD-10-CM

## 2022-11-21 PROBLEM — M53.3 PAIN IN THE COCCYX: Status: ACTIVE | Noted: 2021-03-10

## 2022-11-21 PROBLEM — M51.36 DEGENERATION OF LUMBAR INTERVERTEBRAL DISC: Status: ACTIVE | Noted: 2018-12-26

## 2022-11-21 PROBLEM — M47.817 LUMBOSACRAL SPONDYLOSIS WITHOUT MYELOPATHY: Status: ACTIVE | Noted: 2022-03-10

## 2022-11-21 PROBLEM — M43.16 SPONDYLOLISTHESIS OF LUMBAR REGION: Status: ACTIVE | Noted: 2019-02-20

## 2022-11-21 PROBLEM — I25.10 CORONARY ATHEROSCLEROSIS: Status: ACTIVE | Noted: 2022-09-07

## 2022-11-21 PROBLEM — M47.816 LUMBAR SPONDYLOSIS: Status: ACTIVE | Noted: 2021-06-09

## 2022-11-21 PROBLEM — M51.369 DEGENERATION OF LUMBAR INTERVERTEBRAL DISC: Status: ACTIVE | Noted: 2018-12-26

## 2022-11-21 PROBLEM — M54.17 LUMBOSACRAL RADICULOPATHY: Status: ACTIVE | Noted: 2022-04-07

## 2022-11-21 PROBLEM — M47.812 CERVICAL SPONDYLOSIS WITHOUT MYELOPATHY: Status: ACTIVE | Noted: 2018-12-26

## 2022-11-21 PROCEDURE — 3074F PR MOST RECENT SYSTOLIC BLOOD PRESSURE < 130 MM HG: ICD-10-PCS | Mod: CPTII,S$GLB,, | Performed by: FAMILY MEDICINE

## 2022-11-21 PROCEDURE — 3008F BODY MASS INDEX DOCD: CPT | Mod: CPTII,S$GLB,, | Performed by: FAMILY MEDICINE

## 2022-11-21 PROCEDURE — 99999 PR PBB SHADOW E&M-EST. PATIENT-LVL V: CPT | Mod: PBBFAC,,, | Performed by: FAMILY MEDICINE

## 2022-11-21 PROCEDURE — 3008F PR BODY MASS INDEX (BMI) DOCUMENTED: ICD-10-PCS | Mod: CPTII,S$GLB,, | Performed by: FAMILY MEDICINE

## 2022-11-21 PROCEDURE — 3079F PR MOST RECENT DIASTOLIC BLOOD PRESSURE 80-89 MM HG: ICD-10-PCS | Mod: CPTII,S$GLB,, | Performed by: FAMILY MEDICINE

## 2022-11-21 PROCEDURE — 99214 PR OFFICE/OUTPT VISIT, EST, LEVL IV, 30-39 MIN: ICD-10-PCS | Mod: S$GLB,,, | Performed by: FAMILY MEDICINE

## 2022-11-21 PROCEDURE — 99214 OFFICE O/P EST MOD 30 MIN: CPT | Mod: S$GLB,,, | Performed by: FAMILY MEDICINE

## 2022-11-21 PROCEDURE — 99999 PR PBB SHADOW E&M-EST. PATIENT-LVL V: ICD-10-PCS | Mod: PBBFAC,,, | Performed by: FAMILY MEDICINE

## 2022-11-21 PROCEDURE — 1159F PR MEDICATION LIST DOCUMENTED IN MEDICAL RECORD: ICD-10-PCS | Mod: CPTII,S$GLB,, | Performed by: FAMILY MEDICINE

## 2022-11-21 PROCEDURE — 1159F MED LIST DOCD IN RCRD: CPT | Mod: CPTII,S$GLB,, | Performed by: FAMILY MEDICINE

## 2022-11-21 PROCEDURE — 1160F RVW MEDS BY RX/DR IN RCRD: CPT | Mod: CPTII,S$GLB,, | Performed by: FAMILY MEDICINE

## 2022-11-21 PROCEDURE — 1160F PR REVIEW ALL MEDS BY PRESCRIBER/CLIN PHARMACIST DOCUMENTED: ICD-10-PCS | Mod: CPTII,S$GLB,, | Performed by: FAMILY MEDICINE

## 2022-11-21 PROCEDURE — 3079F DIAST BP 80-89 MM HG: CPT | Mod: CPTII,S$GLB,, | Performed by: FAMILY MEDICINE

## 2022-11-21 PROCEDURE — 3074F SYST BP LT 130 MM HG: CPT | Mod: CPTII,S$GLB,, | Performed by: FAMILY MEDICINE

## 2022-11-21 RX ORDER — LORAZEPAM 1 MG/1
1 TABLET ORAL DAILY PRN
COMMUNITY
Start: 2022-11-14 | End: 2023-05-05

## 2022-11-21 RX ORDER — HYDROMORPHONE HYDROCHLORIDE 2 MG/1
2 TABLET ORAL 4 TIMES DAILY
COMMUNITY
Start: 2022-11-14 | End: 2023-05-05

## 2022-11-21 RX ORDER — HYDROCODONE BITARTRATE AND ACETAMINOPHEN 10; 325 MG/1; MG/1
1 TABLET ORAL 4 TIMES DAILY PRN
Qty: 120 TABLET | Refills: 0 | Status: SHIPPED | OUTPATIENT
Start: 2022-11-21 | End: 2023-02-16 | Stop reason: SDUPTHER
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
## 2022-11-21 NOTE — PATIENT INSTRUCTIONS
Follow up in about 3 months (around 2/21/2023), or if symptoms worsen or fail to improve, for med refill.     Dear patient,   As a result of recent federal legislation (The Federal Cures Act), you may receive lab or pathology results from your visit in your [facility] account before your physician is able to contact you. Your physician or their representative will relay the results to you with their recommendations at their soonest availability.     If no improvement in symptoms or symptoms worsen, please be advised to call MD, follow-up at clinic and/or go to ER if becomes severe.    Buzz Gee M.D.        We Offer TELEHEALTH & Same Day Appointments!   Book your Telehealth appointment with me through my nurse or   Clinic appointments on [facility]!    [address] [place] [address]    Office: [phone]   FAX: [phone]    Check out my Facebook Page and Follow Me at: [email or web address]    Check out my website at [employer] by clicking on: [email or web address]    To Schedule appointments online, go to [facility]: [email or web address]

## 2022-11-21 NOTE — ASSESSMENT & PLAN NOTE
November 2022:  Medication refilled.  August 2022:  Patient has close follow-up scheduled with pain management and neuro surgery.  June 2022:  Patient continues to follow with Neurosurgery and Pain Management.  March 2022:  No change in HPI.  Continue current meds.  Follow-up with neuro surgery.  Follow-up pain management.  Refill sparing use Norco every three months.The patient was checked in the [facility] Board of Pharmacy's  Prescription Monitoring Program. There appears to be no incongruities with the patient's verbalized history.   Avoid heavy lifting.

## 2022-11-21 NOTE — ASSESSMENT & PLAN NOTE
Continue current medications.Discussed insomnia condition course.  Advised of first-line medications for this condition.  Also discussed sleep hygiene.  Information was given below.  Good sleep habits (sometimes referred to as sleep hygiene) can help you get a good nights sleep.    Some habits that can improve your sleep health:  -Be consistent. Go to bed at the same time each night and get up at the same time each morning, including on the weekends  -Make sure your bedroom is quiet, dark, relaxing, and at a comfortable temperature  -Remove electronic devices, such as TVs, computers, and smart phones, from the bedroom  -Avoid large meals, caffeine, and alcohol before bedtime  -Get some exercise. Being physically active during the day can help you fall asleep more easily at night.

## 2022-12-03 ENCOUNTER — PATIENT MESSAGE (OUTPATIENT)
Dept: FAMILY MEDICINE | Facility: CLINIC | Age: 57
End: 2022-12-03
Payer: COMMERCIAL

## 2022-12-21 ENCOUNTER — TELEPHONE (OUTPATIENT)
Dept: FAMILY MEDICINE | Facility: CLINIC | Age: 57
End: 2022-12-21
Payer: COMMERCIAL

## 2022-12-21 NOTE — TELEPHONE ENCOUNTER
----- Message from Bina Nesbitt sent at 12/21/2022 11:33 AM CST -----  Contact: [email or web address]  Patient is calling to speak with the nurse regarding appointment. Reports having pneumonia and is on antibiotic and is needing an appointment in the next week to see if they are working. Please give the patient a call back at [phone]  Thanks dian

## 2023-01-26 ENCOUNTER — HOSPITAL ENCOUNTER (OUTPATIENT)
Dept: RADIOLOGY | Facility: HOSPITAL | Age: 58
Discharge: HOME | End: 2023-01-26
Attending: INTERNAL MEDICINE
Payer: COMMERCIAL

## 2023-01-26 DIAGNOSIS — J18.9 UNRESOLVED PNEUMONIA: ICD-10-CM

## 2023-01-26 PROCEDURE — 71046 XR CHEST PA AND LATERAL: ICD-10-PCS | Mod: 26,,, | Performed by: RADIOLOGY

## 2023-01-26 PROCEDURE — 71046 X-RAY EXAM CHEST 2 VIEWS: CPT | Mod: TC,FY,PO

## 2023-01-26 PROCEDURE — 71046 X-RAY EXAM CHEST 2 VIEWS: CPT | Mod: 26,,, | Performed by: RADIOLOGY

## 2023-02-16 ENCOUNTER — OFFICE VISIT (OUTPATIENT)
Dept: FAMILY MEDICINE | Facility: CLINIC | Age: 58
End: 2023-02-16
Payer: COMMERCIAL

## 2023-02-16 VITALS
HEART RATE: 89 BPM | HEIGHT: 67 IN | DIASTOLIC BLOOD PRESSURE: 80 MMHG | OXYGEN SATURATION: 96 % | WEIGHT: 136 LBS | SYSTOLIC BLOOD PRESSURE: 122 MMHG | BODY MASS INDEX: 21.35 KG/M2

## 2023-02-16 DIAGNOSIS — M54.50 CHRONIC BILATERAL LOW BACK PAIN WITHOUT SCIATICA: ICD-10-CM

## 2023-02-16 DIAGNOSIS — M48.9 CERVICAL SPINE DISEASE: Primary | ICD-10-CM

## 2023-02-16 DIAGNOSIS — G89.29 CHRONIC BILATERAL LOW BACK PAIN WITHOUT SCIATICA: ICD-10-CM

## 2023-02-16 DIAGNOSIS — M48.9 CERVICAL SPINE DISEASE: ICD-10-CM

## 2023-02-16 DIAGNOSIS — Z79.899 ENCOUNTER FOR LONG-TERM (CURRENT) USE OF MEDICATIONS: ICD-10-CM

## 2023-02-16 PROCEDURE — 1159F PR MEDICATION LIST DOCUMENTED IN MEDICAL RECORD: ICD-10-PCS | Mod: CPTII,S$GLB,, | Performed by: NURSE PRACTITIONER

## 2023-02-16 PROCEDURE — 3074F PR MOST RECENT SYSTOLIC BLOOD PRESSURE < 130 MM HG: ICD-10-PCS | Mod: CPTII,S$GLB,, | Performed by: NURSE PRACTITIONER

## 2023-02-16 PROCEDURE — 1160F PR REVIEW ALL MEDS BY PRESCRIBER/CLIN PHARMACIST DOCUMENTED: ICD-10-PCS | Mod: CPTII,S$GLB,, | Performed by: NURSE PRACTITIONER

## 2023-02-16 PROCEDURE — 1160F RVW MEDS BY RX/DR IN RCRD: CPT | Mod: CPTII,S$GLB,, | Performed by: NURSE PRACTITIONER

## 2023-02-16 PROCEDURE — 3079F PR MOST RECENT DIASTOLIC BLOOD PRESSURE 80-89 MM HG: ICD-10-PCS | Mod: CPTII,S$GLB,, | Performed by: NURSE PRACTITIONER

## 2023-02-16 PROCEDURE — 3074F SYST BP LT 130 MM HG: CPT | Mod: CPTII,S$GLB,, | Performed by: NURSE PRACTITIONER

## 2023-02-16 PROCEDURE — 99213 OFFICE O/P EST LOW 20 MIN: CPT | Mod: S$GLB,,, | Performed by: NURSE PRACTITIONER

## 2023-02-16 PROCEDURE — 3079F DIAST BP 80-89 MM HG: CPT | Mod: CPTII,S$GLB,, | Performed by: NURSE PRACTITIONER

## 2023-02-16 PROCEDURE — 99999 PR PBB SHADOW E&M-EST. PATIENT-LVL IV: CPT | Mod: PBBFAC,,, | Performed by: NURSE PRACTITIONER

## 2023-02-16 PROCEDURE — 99999 PR PBB SHADOW E&M-EST. PATIENT-LVL IV: ICD-10-PCS | Mod: PBBFAC,,, | Performed by: NURSE PRACTITIONER

## 2023-02-16 PROCEDURE — 3008F PR BODY MASS INDEX (BMI) DOCUMENTED: ICD-10-PCS | Mod: CPTII,S$GLB,, | Performed by: NURSE PRACTITIONER

## 2023-02-16 PROCEDURE — 99213 PR OFFICE/OUTPT VISIT, EST, LEVL III, 20-29 MIN: ICD-10-PCS | Mod: S$GLB,,, | Performed by: NURSE PRACTITIONER

## 2023-02-16 PROCEDURE — 3008F BODY MASS INDEX DOCD: CPT | Mod: CPTII,S$GLB,, | Performed by: NURSE PRACTITIONER

## 2023-02-16 PROCEDURE — 1159F MED LIST DOCD IN RCRD: CPT | Mod: CPTII,S$GLB,, | Performed by: NURSE PRACTITIONER

## 2023-02-16 RX ORDER — FLUTICASONE PROPIONATE 50 MCG
2 SPRAY, SUSPENSION (ML) NASAL
COMMUNITY
Start: 2022-12-10 | End: 2023-12-19

## 2023-02-16 RX ORDER — HYDROCODONE BITARTRATE AND ACETAMINOPHEN 10; 325 MG/1; MG/1
1 TABLET ORAL 4 TIMES DAILY PRN
Qty: 120 TABLET | Refills: 0 | Status: CANCELLED | OUTPATIENT
Start: 2023-02-16

## 2023-02-16 RX ORDER — HYDROCODONE BITARTRATE AND ACETAMINOPHEN 10; 325 MG/1; MG/1
1 TABLET ORAL 4 TIMES DAILY PRN
Qty: 120 TABLET | Refills: 0 | Status: SHIPPED | OUTPATIENT
Start: 2023-02-16 | End: 2023-05-05 | Stop reason: SDUPTHER

## 2023-02-16 NOTE — ASSESSMENT & PLAN NOTE
Chronic. Stable. Taking Norco as prescribed with relief reported. Requesting refills. Tolerating medication well with no SE reported. Continue pain medication as prescribed per PCP. RTC in 3 months for re-evaluation. Plan was disucssed with Dr. Gee who was immediately available in clinic. The patient was checked in the [facility] Board of Pharmacy's  Prescription Monitoring Program. There appears to be no incongruities with the patient's verbalized history. Follow up with neurosurgery and pain management.

## 2023-02-16 NOTE — ASSESSMENT & PLAN NOTE
Feb 2023: Here for medication refills. See cervical spine problem. Discussed with PCP.  reviewed. F/u in 3 months.   November 2022:  Medication refilled.  August 2022:  Patient has close follow-up scheduled with pain management and neuro surgery.  June 2022:  Patient continues to follow with Neurosurgery and Pain Management.  March 2022:  No change in HPI.  Continue current meds.  Follow-up with neuro surgery.  Follow-up pain management.  Refill sparing use Norco every three months.The patient was checked in the [facility] Board of Pharmacy's  Prescription Monitoring Program. There appears to be no incongruities with the patient's verbalized history.   Avoid heavy lifting.
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
## 2023-05-05 ENCOUNTER — OFFICE VISIT (OUTPATIENT)
Dept: FAMILY MEDICINE | Facility: CLINIC | Age: 58
End: 2023-05-05
Payer: COMMERCIAL

## 2023-05-05 ENCOUNTER — LAB VISIT (OUTPATIENT)
Dept: LAB | Facility: HOSPITAL | Age: 58
End: 2023-05-05
Attending: FAMILY MEDICINE
Payer: COMMERCIAL

## 2023-05-05 VITALS
HEART RATE: 77 BPM | WEIGHT: 139 LBS | SYSTOLIC BLOOD PRESSURE: 118 MMHG | DIASTOLIC BLOOD PRESSURE: 76 MMHG | BODY MASS INDEX: 21.82 KG/M2 | TEMPERATURE: 98 F | HEIGHT: 67 IN

## 2023-05-05 DIAGNOSIS — G89.29 CHRONIC BILATERAL LOW BACK PAIN WITHOUT SCIATICA: Primary | ICD-10-CM

## 2023-05-05 DIAGNOSIS — M54.50 CHRONIC BILATERAL LOW BACK PAIN WITHOUT SCIATICA: ICD-10-CM

## 2023-05-05 DIAGNOSIS — M54.50 CHRONIC BILATERAL LOW BACK PAIN WITHOUT SCIATICA: Primary | ICD-10-CM

## 2023-05-05 DIAGNOSIS — F41.9 ANXIETY: ICD-10-CM

## 2023-05-05 DIAGNOSIS — G47.00 INSOMNIA, UNSPECIFIED TYPE: ICD-10-CM

## 2023-05-05 DIAGNOSIS — G89.29 CHRONIC BILATERAL LOW BACK PAIN WITHOUT SCIATICA: ICD-10-CM

## 2023-05-05 DIAGNOSIS — Z79.899 ENCOUNTER FOR LONG-TERM (CURRENT) USE OF MEDICATIONS: ICD-10-CM

## 2023-05-05 DIAGNOSIS — M48.9 CERVICAL SPINE DISEASE: ICD-10-CM

## 2023-05-05 LAB
AMPHET+METHAMPHET UR QL: NEGATIVE
BARBITURATES UR QL SCN>200 NG/ML: NEGATIVE
BENZODIAZ UR QL SCN>200 NG/ML: NEGATIVE
BZE UR QL SCN: NEGATIVE
CANNABINOIDS UR QL SCN: NEGATIVE
CREAT UR-MCNC: 199 MG/DL (ref 15–325)
ETHANOL UR-MCNC: <10 MG/DL
METHADONE UR QL SCN>300 NG/ML: NEGATIVE
OPIATES UR QL SCN: ABNORMAL
PCP UR QL SCN>25 NG/ML: NEGATIVE
TOXICOLOGY INFORMATION: ABNORMAL

## 2023-05-05 PROCEDURE — 3044F HG A1C LEVEL LT 7.0%: CPT | Mod: CPTII,S$GLB,, | Performed by: FAMILY MEDICINE

## 2023-05-05 PROCEDURE — 1160F RVW MEDS BY RX/DR IN RCRD: CPT | Mod: CPTII,S$GLB,, | Performed by: FAMILY MEDICINE

## 2023-05-05 PROCEDURE — 3074F SYST BP LT 130 MM HG: CPT | Mod: CPTII,S$GLB,, | Performed by: FAMILY MEDICINE

## 2023-05-05 PROCEDURE — 3044F PR MOST RECENT HEMOGLOBIN A1C LEVEL <7.0%: ICD-10-PCS | Mod: CPTII,S$GLB,, | Performed by: FAMILY MEDICINE

## 2023-05-05 PROCEDURE — 80307 DRUG TEST PRSMV CHEM ANLYZR: CPT | Performed by: FAMILY MEDICINE

## 2023-05-05 PROCEDURE — 99214 OFFICE O/P EST MOD 30 MIN: CPT | Mod: S$GLB,,, | Performed by: FAMILY MEDICINE

## 2023-05-05 PROCEDURE — 1159F MED LIST DOCD IN RCRD: CPT | Mod: CPTII,S$GLB,, | Performed by: FAMILY MEDICINE

## 2023-05-05 PROCEDURE — 1159F PR MEDICATION LIST DOCUMENTED IN MEDICAL RECORD: ICD-10-PCS | Mod: CPTII,S$GLB,, | Performed by: FAMILY MEDICINE

## 2023-05-05 PROCEDURE — 3078F PR MOST RECENT DIASTOLIC BLOOD PRESSURE < 80 MM HG: ICD-10-PCS | Mod: CPTII,S$GLB,, | Performed by: FAMILY MEDICINE

## 2023-05-05 PROCEDURE — 99214 PR OFFICE/OUTPT VISIT, EST, LEVL IV, 30-39 MIN: ICD-10-PCS | Mod: S$GLB,,, | Performed by: FAMILY MEDICINE

## 2023-05-05 PROCEDURE — 3008F BODY MASS INDEX DOCD: CPT | Mod: CPTII,S$GLB,, | Performed by: FAMILY MEDICINE

## 2023-05-05 PROCEDURE — 1160F PR REVIEW ALL MEDS BY PRESCRIBER/CLIN PHARMACIST DOCUMENTED: ICD-10-PCS | Mod: CPTII,S$GLB,, | Performed by: FAMILY MEDICINE

## 2023-05-05 PROCEDURE — 3008F PR BODY MASS INDEX (BMI) DOCUMENTED: ICD-10-PCS | Mod: CPTII,S$GLB,, | Performed by: FAMILY MEDICINE

## 2023-05-05 PROCEDURE — 3074F PR MOST RECENT SYSTOLIC BLOOD PRESSURE < 130 MM HG: ICD-10-PCS | Mod: CPTII,S$GLB,, | Performed by: FAMILY MEDICINE

## 2023-05-05 PROCEDURE — 99999 PR PBB SHADOW E&M-EST. PATIENT-LVL IV: ICD-10-PCS | Mod: PBBFAC,,, | Performed by: FAMILY MEDICINE

## 2023-05-05 PROCEDURE — 99999 PR PBB SHADOW E&M-EST. PATIENT-LVL IV: CPT | Mod: PBBFAC,,, | Performed by: FAMILY MEDICINE

## 2023-05-05 PROCEDURE — 3078F DIAST BP <80 MM HG: CPT | Mod: CPTII,S$GLB,, | Performed by: FAMILY MEDICINE

## 2023-05-05 RX ORDER — HYDROCODONE BITARTRATE AND ACETAMINOPHEN 10; 325 MG/1; MG/1
1 TABLET ORAL 4 TIMES DAILY PRN
Qty: 120 TABLET | Refills: 0 | Status: SHIPPED | OUTPATIENT
Start: 2023-05-05 | End: 2023-08-02 | Stop reason: SDUPTHER

## 2023-05-05 RX ORDER — ALPRAZOLAM 0.25 MG/1
0.25 TABLET ORAL 2 TIMES DAILY PRN
Qty: 45 TABLET | Refills: 0 | Status: SHIPPED | OUTPATIENT
Start: 2023-05-05 | End: 2023-11-02 | Stop reason: SDUPTHER

## 2023-05-05 RX ORDER — ZOLPIDEM TARTRATE 10 MG/1
TABLET ORAL
Qty: 30 TABLET | Refills: 5 | Status: SHIPPED | OUTPATIENT
Start: 2023-05-05 | End: 2023-11-02 | Stop reason: SDUPTHER

## 2023-05-05 NOTE — PATIENT INSTRUCTIONS
Follow up in about 3 months (around 8/5/2023), or if symptoms worsen or fail to improve, for Pain med Refill, with Kylie Damico NP.     Dear patient,   As a result of recent federal legislation (The Federal Cures Act), you may receive lab or pathology results from your visit in your [facility] account before your physician is able to contact you. Your physician or their representative will relay the results to you with their recommendations at their soonest availability.     If no improvement in symptoms or symptoms worsen, please be advised to call MD, follow-up at clinic and/or go to ER if becomes severe.    Buzz Gee M.D.        We Offer TELEHEALTH & Same Day Appointments!   Book your Telehealth appointment with me through my nurse or   Clinic appointments on [facility]!    [address] [place] [address]    Office: [phone]   FAX: [phone]    Check out my Facebook Page and Follow Me at: [email or web address]    Check out my website at [employer] by clicking on: [email or web address]    To Schedule appointments online, go to [facility]: [email or web address]

## 2023-05-05 NOTE — ASSESSMENT & PLAN NOTE
Request labs from cardiologist office.Complete history and physical was completed today.  Complete and thorough medication reconciliation was performed.  Discussed risks and benefits of medications.  Advised patient on orders and health maintenance.  We discussed old records and old labs if available.  Will request any records not available through epic.  Continue current medications listed on your summary sheet.

## 2023-05-05 NOTE — ASSESSMENT & PLAN NOTE
May 2023:  Refilling medication.  Feb 2023: Here for medication refills. See cervical spine problem. Discussed with PCP.  reviewed. F/u in 3 months.   November 2022:  Medication refilled.  August 2022:  Patient has close follow-up scheduled with pain management and neuro surgery.  June 2022:  Patient continues to follow with Neurosurgery and Pain Management.  March 2022:  No change in HPI.  Continue current meds.  Follow-up with neuro surgery.  Follow-up pain management.  Refill sparing use Norco every three months.The patient was checked in the [facility] Board of Pharmacy's  Prescription Monitoring Program. There appears to be no incongruities with the patient's verbalized history.   Avoid heavy lifting.

## 2023-05-05 NOTE — PROGRESS NOTES
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   Urine       Medications Ordered This Encounter   Medications    ALPRAZolam (XANAX) 0.25 MG tablet     Sig: Take 1 tablet (0.25 mg total) by mouth 2 (two) times daily as needed for Anxiety.     Dispense:  45 tablet     Refill:  0    HYDROcodone-acetaminophen (NORCO)  mg per tablet     Sig: Take 1 tablet by mouth 4 (four) times daily as needed for Pain.     Dispense:  120 tablet     Refill:  0     Quantity prescribed more than 7 day supply? Yes, quantity medically necessary    zolpidem (AMBIEN) 10 mg Tab     Sig: TAKE 1 TABLET BY MOUTH EVERY NIGHT AT BEDTIME     Dispense:  30 tablet     Refill:  5          Follow up in about 3 months (around 8/5/2023), or if symptoms worsen or fail to improve, for Pain med Refill, with Kylie Damico NP.  Future Appointments       Date Provider Specialty Appt Notes    8/9/2023 Buzz Gee MD Family Medicine med refill              If no improvement in symptoms or symptoms worsen, advised to call/follow-up at clinic or go to ER. Patient voiced understanding and all questions/concerns were addressed.   DISCLAIMER: This note was compiled by using a speech recognition dictation system and therefore please be aware that typographical / speech recognition errors can and do  occur.  Please contact me if you see any errors specifically.    Buzz Gee M.D.       Office: [phone] [address] [place] [address]  FAX: [phone]

## 2023-05-17 LAB
HUMAN PAPILLOMAVIRUS (HPV): NORMAL
PAP RECOMMENDATION EXT: NORMAL

## 2023-06-08 NOTE — PROGRESS NOTES
Per last apt on 2-9-23:     Return in about 4 months (around 6/9/2023).  Continue methotrexate 0.6 cc which is 15 milligrams subcutaneously once a week.      Continue Enbrel 50 milligrams once a week.      Please hold methotrexate and Enbrel if you have any signs/concerns for infection.      Continue colchicine 0.6 milligrams which is 1 tablet once a day.  On bad days, you could increase the dose of colchicine to 1 tablet twice a day, see if it is helpful.    We will place a referral to wound clinic to help you heal sores in your legs.           The patient presents today to fu arthralgia more stable resp to HC and celebrex. Rev Pulm con. Pt had Prev 13  Past Medical History:  Past Medical History:   Diagnosis Date    Bulging disc     Fibrocystic breast      Past Surgical History:   Procedure Laterality Date    MANDIBLE FRACTURE SURGERY       Review of patient's allergies indicates:   Allergen Reactions    No known drug allergies      Current Outpatient Prescriptions on File Prior to Visit   Medication Sig Dispense Refill    COMBIPATCH 0.05-0.14 mg/24 hr APPLY TWICE A WEEK AS DIRECTED 24 patch 0    diclofenac (CATAFLAM) 50 MG tablet Take 1 tablet (50 mg total) by mouth 2 (two) times daily. 180 tablet 4    hydrocodone-acetaminophen 10-325mg (NORCO)  mg Tab Take 1 tablet by mouth 4 (four) times daily. 120 tablet 0    zolpidem (AMBIEN) 10 mg Tab Take 1 tablet (10 mg total) by mouth nightly as needed. 30 tablet 5     No current facility-administered medications on file prior to visit.      Social History     Social History    Marital status: Single     Spouse name: N/A    Number of children: N/A    Years of education: N/A     Occupational History     [employer]     Social History Main Topics    Smoking status: Never Smoker    Smokeless tobacco: Never Used    Alcohol use No    Drug use: No    Sexual activity: Yes     Birth control/ protection: Post-menopausal     Other Topics Concern    Not on file     Social History Narrative    No narrative on file     Family History   Problem Relation Age of Onset    Hypertension Mother     Diabetes Mother     Cancer Father     Breast cancer Paternal Aunt     Colon cancer Neg Hx     Ovarian cancer Neg Hx          ROS:GENERAL: No fever, chills, fatigability or weight loss.  SKIN: No rashes, itching or changes in color or texture of skin.  HEAD: No headaches or recent head trauma.EYES: Visual acuity fine. No photophobia, ocular pain or diplopia.EARS: Denies ear pain, discharge or  vertigo.NOSE: No loss of smell, no epistaxis or postnasal drip.MOUTH & THROAT: No hoarseness or change in voice. No excessive gum bleeding.NODES: Denies swollen glands.  CHEST: Denies ARROYO, cyanosis, wheezing, cough and sputum production.  CARDIOVASCULAR: Denies chest pain, PND, orthopnea or reduced exercise tolerance.  ABDOMEN: Appetite fine. No weight loss. Denies diarrhea, abdominal pain, hematemesis or blood in stool.  URINARY: No flank pain, dysuria or hematuria.  PERIPHERAL VASCULAR: No claudication or cyanosis.  MUSCULOSKELETAL: See above.  NEUROLOGIC: No history of seizures, paralysis, alteration of gait or coordination.  PE:   HEAD: Normocephalic, atraumatic.EYES: PERRL. EOMI.   EARS: TM's intact. Light reflex normal. No retraction or perforation.   NOSE: Mucosa pink. Airway clear.MOUTH & THROAT: No tonsillar enlargement. No pharyngeal erythema or exudate. No stridor.  NODES: No cervical, axillary or inguinal lymph node enlargement.  CHEST: Lungs clear to auscultation.  CARDIOVASCULAR: Normal S1, S2. No rubs, murmurs or gallops.  ABDOMEN: Bowel sounds normal. Not distended. Soft. No tenderness or masses.  MUSCULOSKELETAL: Mod gen degen changes NEUROLOGIC: Cranial Nerves: II-XII grossly intact.  Motor: 5/5 strength major flexors/extensors.  DTR's: Knees, Ankles 2+ and equal bilaterally; downgoing toes.  Sensory: Intact to light touch distally.  Gait & Posture: Normal gait and fine motion. No cerebellar signs.     Impression: Cervical and lumbar DDD  CD GONZALEZ  GERD  Plan:   Rec diet and ex recs  Rev ok HC for sparing use and   Continue H2 and or gaviscon-rev PPI risk low Mg renal fx   Trial meloxicam 15

## 2023-07-28 ENCOUNTER — PATIENT MESSAGE (OUTPATIENT)
Dept: ADMINISTRATIVE | Facility: HOSPITAL | Age: 58
End: 2023-07-28
Payer: COMMERCIAL

## 2023-08-02 ENCOUNTER — OFFICE VISIT (OUTPATIENT)
Dept: FAMILY MEDICINE | Facility: CLINIC | Age: 58
End: 2023-08-02
Payer: COMMERCIAL

## 2023-08-02 VITALS
DIASTOLIC BLOOD PRESSURE: 79 MMHG | HEART RATE: 68 BPM | HEIGHT: 67 IN | SYSTOLIC BLOOD PRESSURE: 139 MMHG | TEMPERATURE: 98 F | BODY MASS INDEX: 22.13 KG/M2 | WEIGHT: 141 LBS

## 2023-08-02 DIAGNOSIS — G89.29 CHRONIC BILATERAL LOW BACK PAIN WITHOUT SCIATICA: Primary | ICD-10-CM

## 2023-08-02 DIAGNOSIS — M54.50 CHRONIC BILATERAL LOW BACK PAIN WITHOUT SCIATICA: Primary | ICD-10-CM

## 2023-08-02 DIAGNOSIS — M48.9 CERVICAL SPINE DISEASE: ICD-10-CM

## 2023-08-02 DIAGNOSIS — Z79.899 ENCOUNTER FOR LONG-TERM (CURRENT) USE OF MEDICATIONS: ICD-10-CM

## 2023-08-02 PROCEDURE — 99999 PR PBB SHADOW E&M-EST. PATIENT-LVL IV: ICD-10-PCS | Mod: PBBFAC,,, | Performed by: NURSE PRACTITIONER

## 2023-08-02 PROCEDURE — 3044F HG A1C LEVEL LT 7.0%: CPT | Mod: CPTII,S$GLB,, | Performed by: NURSE PRACTITIONER

## 2023-08-02 PROCEDURE — 3075F SYST BP GE 130 - 139MM HG: CPT | Mod: CPTII,S$GLB,, | Performed by: NURSE PRACTITIONER

## 2023-08-02 PROCEDURE — 1159F MED LIST DOCD IN RCRD: CPT | Mod: CPTII,S$GLB,, | Performed by: NURSE PRACTITIONER

## 2023-08-02 PROCEDURE — 3044F PR MOST RECENT HEMOGLOBIN A1C LEVEL <7.0%: ICD-10-PCS | Mod: CPTII,S$GLB,, | Performed by: NURSE PRACTITIONER

## 2023-08-02 PROCEDURE — 99999 PR PBB SHADOW E&M-EST. PATIENT-LVL IV: CPT | Mod: PBBFAC,,, | Performed by: NURSE PRACTITIONER

## 2023-08-02 PROCEDURE — 1160F RVW MEDS BY RX/DR IN RCRD: CPT | Mod: CPTII,S$GLB,, | Performed by: NURSE PRACTITIONER

## 2023-08-02 PROCEDURE — 99213 OFFICE O/P EST LOW 20 MIN: CPT | Mod: S$GLB,,, | Performed by: NURSE PRACTITIONER

## 2023-08-02 PROCEDURE — 1160F PR REVIEW ALL MEDS BY PRESCRIBER/CLIN PHARMACIST DOCUMENTED: ICD-10-PCS | Mod: CPTII,S$GLB,, | Performed by: NURSE PRACTITIONER

## 2023-08-02 PROCEDURE — 3008F PR BODY MASS INDEX (BMI) DOCUMENTED: ICD-10-PCS | Mod: CPTII,S$GLB,, | Performed by: NURSE PRACTITIONER

## 2023-08-02 PROCEDURE — 3078F PR MOST RECENT DIASTOLIC BLOOD PRESSURE < 80 MM HG: ICD-10-PCS | Mod: CPTII,S$GLB,, | Performed by: NURSE PRACTITIONER

## 2023-08-02 PROCEDURE — 99213 PR OFFICE/OUTPT VISIT, EST, LEVL III, 20-29 MIN: ICD-10-PCS | Mod: S$GLB,,, | Performed by: NURSE PRACTITIONER

## 2023-08-02 PROCEDURE — 1159F PR MEDICATION LIST DOCUMENTED IN MEDICAL RECORD: ICD-10-PCS | Mod: CPTII,S$GLB,, | Performed by: NURSE PRACTITIONER

## 2023-08-02 PROCEDURE — 3075F PR MOST RECENT SYSTOLIC BLOOD PRESS GE 130-139MM HG: ICD-10-PCS | Mod: CPTII,S$GLB,, | Performed by: NURSE PRACTITIONER

## 2023-08-02 PROCEDURE — 3008F BODY MASS INDEX DOCD: CPT | Mod: CPTII,S$GLB,, | Performed by: NURSE PRACTITIONER

## 2023-08-02 PROCEDURE — 3078F DIAST BP <80 MM HG: CPT | Mod: CPTII,S$GLB,, | Performed by: NURSE PRACTITIONER

## 2023-08-02 RX ORDER — MELOXICAM 15 MG/1
15 TABLET ORAL DAILY
Qty: 30 TABLET | Refills: 5 | Status: ON HOLD | OUTPATIENT
Start: 2023-08-02 | End: 2024-01-01 | Stop reason: HOSPADM

## 2023-08-02 RX ORDER — HYDROCODONE BITARTRATE AND ACETAMINOPHEN 10; 325 MG/1; MG/1
1 TABLET ORAL 4 TIMES DAILY PRN
Qty: 120 TABLET | Refills: 0 | Status: CANCELLED | OUTPATIENT
Start: 2023-08-02

## 2023-08-02 RX ORDER — HYDROCODONE BITARTRATE AND ACETAMINOPHEN 10; 325 MG/1; MG/1
1 TABLET ORAL 4 TIMES DAILY PRN
Qty: 120 TABLET | Refills: 0 | Status: SHIPPED | OUTPATIENT
Start: 2023-08-04 | End: 2023-11-02 | Stop reason: SDUPTHER

## 2023-08-02 NOTE — ASSESSMENT & PLAN NOTE
Continue pain medication as prescribed per PCP. RTC in 3 months for re-evaluation. Plan was disucssed with Dr. Gee who was immediately available in clinic. The patient was checked in the [facility] Board of Pharmacy's  Prescription Monitoring Program. There appears to be no incongruities with the patient's verbalized history.  If no improvement with pain medications patient will be referred to pain management for further evaluation.

## 2023-08-02 NOTE — PROGRESS NOTES
Patient requests pain medication refills. Please see recent encounter for details. See  below.
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
## 2023-08-24 ENCOUNTER — TELEPHONE (OUTPATIENT)
Dept: FAMILY MEDICINE | Facility: CLINIC | Age: 58
End: 2023-08-24
Payer: COMMERCIAL

## 2023-08-24 NOTE — TELEPHONE ENCOUNTER
----- Message from Janine Byers sent at 8/24/2023  8:31 AM CDT -----  Contact: XIMENA  .Type:  Sooner Apoointment Request    Caller is requesting a sooner appointment.  Caller declined first available appointment listed below.  Caller will not accept being placed on the waitlist and is requesting a message be sent to doctor.  Name of Caller: XIMENA  When is the first available appointment?   Symptoms:   MEDICATION REFILL   Would the patient rather a call back or a response via [facility]? CALL   Best Call Back Number:.[phone]    Additional Information: REQUESTING NOVEMBER 2-3

## 2023-10-12 ENCOUNTER — PATIENT MESSAGE (OUTPATIENT)
Dept: FAMILY MEDICINE | Facility: CLINIC | Age: 58
End: 2023-10-12
Payer: COMMERCIAL

## 2023-10-12 DIAGNOSIS — Z71.85 VACCINE COUNSELING: Primary | ICD-10-CM

## 2023-10-12 NOTE — TELEPHONE ENCOUNTER
I received your message which was reviewed along with the the medication list and allergies that we have below.  Please review it for accuracy to make sure that we have the most recent records on your history.     Based on this, the following orders were placed AND/OR medicines were sent in.     Orders Placed This Encounter   Procedures    DIPHTHERIA / TETANUS ANTIBODY PANEL     Standing Status:   Future     Standing Expiration Date:   12/10/2024       Medications written and sent at this time include:       Your pharmacy(ies) of choice at this time on record include the list below and any medications would have been sent to the one at the top.    [id number] - [place], [place] - [address] AT [facility] [address]  [address]  [facility] [address]  Phone: [phone] Fax: [phone]    [facility] #[id number] - [place], [place] - [address] AT [facility] [address] [facility] [address]  [address]  [facility] [address]  Phone: [phone] Fax: [phone]      Thank you for choosing us as your healthcare provider!  Dr. Buzz Gee    ALLERGY LIST  Review of patient's allergies indicates:   Allergen Reactions    Coconut Itching and Swelling    Coconut oil Photosensitivity    Lexapro [escitalopram]        MEDICATION LIST  Current Outpatient Medications on File Prior to Visit   Medication Sig Dispense Refill    acyclovir (ZOVIRAX) 400 MG tablet Take 1 tablet (400 mg total) by mouth 4 (four) times daily. (Patient not taking: Reported on 8/2/2023) 40 tablet 1    ALPRAZolam (XANAX) 0.25 MG tablet Take 1 tablet (0.25 mg total) by mouth 2 (two) times daily as needed for Anxiety. 45 tablet 0    estradiol-norethindrone (COMBIPATCH) 0.05-0.14 mg/24 hr UNWRAP AND APPLY 1 PATCH ON THE SKIN TWICE A WEEK 8 patch 6    fluticasone propionate (FLONASE) 50 mcg/actuation nasal spray 2 sprays by Each Nostril route.      fluticasone-salmeterol diskus inhaler 250-50 mcg Inhale 1 puff  into the lungs 2 (two) times daily. Controller (Patient not taking: Reported on 8/2/2023) 1 each 5    HYDROcodone-acetaminophen (NORCO)  mg per tablet Take 1 tablet by mouth 4 (four) times daily as needed for Pain. 120 tablet 0    levalbuterol (XOPENEX HFA) 45 mcg/actuation inhaler INHALE 2 PUFFS INTO THE LUNGS EVERY 4 HOURS AS NEEDED FOR WHEEZING 15 g 12    losartan-hydrochlorothiazide 50-12.5 mg (HYZAAR) 50-12.5 mg per tablet Take 1 tablet by mouth once daily.      meloxicam (MOBIC) 15 MG tablet Take 1 tablet (15 mg total) by mouth once daily. 30 tablet 5    pantoprazole (PROTONIX) 40 MG tablet Take 40 mg by mouth once daily.      tiZANidine 2 mg Cap Take 1 capsule (2 mg total) by mouth 3 (three) times daily as needed (muscle spasm). (Patient not taking: Reported on 8/2/2023) 45 capsule 0    zolpidem (AMBIEN) 10 mg Tab TAKE 1 TABLET BY MOUTH EVERY NIGHT AT BEDTIME 30 tablet 5     No current facility-administered medications on file prior to visit.       HEALTH MAINTENANCE THAT IS OVERDUE OR NEEDS TO BE UPDATED ON OUR CHART IS LISTED BELOW.  IF YOU HAVE HAD IT DONE ELSEWHERE, PLEASE SEND US DATES AND RECORDS IF YOU HAVE THEM TO MAKE YOUR CHART ACCURATE.  IF YOU HAVE NOT HAD THESE DONE AND ARE READY FOR US TO SCHEDULE THEM, PLEASE SEND US A MESSAGE.  Health Maintenance Due   Topic Date Due    Shingles Vaccine (1 of 2) Never done    Mammogram  08/31/2023    Influenza Vaccine (1) 09/01/2023    COVID-19 Vaccine (3 - 2023-24 season) 09/01/2023       DISCLAIMER: This note was compiled by using a speech recognition dictation system and therefore please be aware that typographical / speech recognition errors can and do occur.  Please contact me if you see any errors specifically.    Buzz Gee MD  We Offer Telehealth & Same Day Appointments!   Book your Telehealth appointment with me through my nurse or   Clinic appointments on expressor software!  [phone]     Check out my Facebook Page and Follow Me at:  CLICK HERE    Check out my website at [facility] by clicking on: CLICK HERE    To Schedule appointments online, go to MyChart: CLICK HERE     Location: [email or web address]    [address] [facility]  [place] [address]    FAX: [phone]

## 2023-11-02 ENCOUNTER — OFFICE VISIT (OUTPATIENT)
Dept: FAMILY MEDICINE | Facility: CLINIC | Age: 58
End: 2023-11-02
Payer: COMMERCIAL

## 2023-11-02 VITALS
BODY MASS INDEX: 22.44 KG/M2 | DIASTOLIC BLOOD PRESSURE: 81 MMHG | RESPIRATION RATE: 18 BRPM | HEART RATE: 61 BPM | SYSTOLIC BLOOD PRESSURE: 133 MMHG | HEIGHT: 67 IN | WEIGHT: 143 LBS

## 2023-11-02 DIAGNOSIS — F41.9 ANXIETY: ICD-10-CM

## 2023-11-02 DIAGNOSIS — M54.50 CHRONIC BILATERAL LOW BACK PAIN WITHOUT SCIATICA: ICD-10-CM

## 2023-11-02 DIAGNOSIS — G89.29 CHRONIC BILATERAL LOW BACK PAIN WITHOUT SCIATICA: ICD-10-CM

## 2023-11-02 DIAGNOSIS — Z79.899 ENCOUNTER FOR LONG-TERM (CURRENT) USE OF MEDICATIONS: ICD-10-CM

## 2023-11-02 DIAGNOSIS — G47.00 INSOMNIA, UNSPECIFIED TYPE: ICD-10-CM

## 2023-11-02 DIAGNOSIS — M48.9 CERVICAL SPINE DISEASE: ICD-10-CM

## 2023-11-02 PROCEDURE — 1160F RVW MEDS BY RX/DR IN RCRD: CPT | Mod: CPTII,S$GLB,, | Performed by: NURSE PRACTITIONER

## 2023-11-02 PROCEDURE — 1159F PR MEDICATION LIST DOCUMENTED IN MEDICAL RECORD: ICD-10-PCS | Mod: CPTII,S$GLB,, | Performed by: NURSE PRACTITIONER

## 2023-11-02 PROCEDURE — 99214 OFFICE O/P EST MOD 30 MIN: CPT | Mod: S$GLB,,, | Performed by: NURSE PRACTITIONER

## 2023-11-02 PROCEDURE — 3044F PR MOST RECENT HEMOGLOBIN A1C LEVEL <7.0%: ICD-10-PCS | Mod: CPTII,S$GLB,, | Performed by: NURSE PRACTITIONER

## 2023-11-02 PROCEDURE — 3008F BODY MASS INDEX DOCD: CPT | Mod: CPTII,S$GLB,, | Performed by: NURSE PRACTITIONER

## 2023-11-02 PROCEDURE — 3044F HG A1C LEVEL LT 7.0%: CPT | Mod: CPTII,S$GLB,, | Performed by: NURSE PRACTITIONER

## 2023-11-02 PROCEDURE — 3008F PR BODY MASS INDEX (BMI) DOCUMENTED: ICD-10-PCS | Mod: CPTII,S$GLB,, | Performed by: NURSE PRACTITIONER

## 2023-11-02 PROCEDURE — 99999 PR PBB SHADOW E&M-EST. PATIENT-LVL IV: ICD-10-PCS | Mod: PBBFAC,,, | Performed by: NURSE PRACTITIONER

## 2023-11-02 PROCEDURE — 1160F PR REVIEW ALL MEDS BY PRESCRIBER/CLIN PHARMACIST DOCUMENTED: ICD-10-PCS | Mod: CPTII,S$GLB,, | Performed by: NURSE PRACTITIONER

## 2023-11-02 PROCEDURE — 1159F MED LIST DOCD IN RCRD: CPT | Mod: CPTII,S$GLB,, | Performed by: NURSE PRACTITIONER

## 2023-11-02 PROCEDURE — 3079F DIAST BP 80-89 MM HG: CPT | Mod: CPTII,S$GLB,, | Performed by: NURSE PRACTITIONER

## 2023-11-02 PROCEDURE — 99214 PR OFFICE/OUTPT VISIT, EST, LEVL IV, 30-39 MIN: ICD-10-PCS | Mod: S$GLB,,, | Performed by: NURSE PRACTITIONER

## 2023-11-02 PROCEDURE — 3075F SYST BP GE 130 - 139MM HG: CPT | Mod: CPTII,S$GLB,, | Performed by: NURSE PRACTITIONER

## 2023-11-02 PROCEDURE — 3079F PR MOST RECENT DIASTOLIC BLOOD PRESSURE 80-89 MM HG: ICD-10-PCS | Mod: CPTII,S$GLB,, | Performed by: NURSE PRACTITIONER

## 2023-11-02 PROCEDURE — 99999 PR PBB SHADOW E&M-EST. PATIENT-LVL IV: CPT | Mod: PBBFAC,,, | Performed by: NURSE PRACTITIONER

## 2023-11-02 PROCEDURE — 3075F PR MOST RECENT SYSTOLIC BLOOD PRESS GE 130-139MM HG: ICD-10-PCS | Mod: CPTII,S$GLB,, | Performed by: NURSE PRACTITIONER

## 2023-11-02 RX ORDER — HYDROCODONE BITARTRATE AND ACETAMINOPHEN 10; 325 MG/1; MG/1
1 TABLET ORAL 4 TIMES DAILY PRN
Qty: 120 TABLET | Refills: 0 | Status: CANCELLED | OUTPATIENT
Start: 2023-11-02

## 2023-11-02 RX ORDER — LOSARTAN POTASSIUM AND HYDROCHLOROTHIAZIDE 12.5; 5 MG/1; MG/1
1 TABLET ORAL DAILY
Qty: 90 TABLET | Refills: 3 | Status: ON HOLD | OUTPATIENT
Start: 2023-11-02 | End: 2024-01-01 | Stop reason: HOSPADM

## 2023-11-02 RX ORDER — HYDROCODONE BITARTRATE AND ACETAMINOPHEN 10; 325 MG/1; MG/1
1 TABLET ORAL 4 TIMES DAILY PRN
Qty: 120 TABLET | Refills: 0 | Status: SHIPPED | OUTPATIENT
Start: 2023-11-02 | End: 2024-02-02 | Stop reason: SDUPTHER

## 2023-11-02 RX ORDER — ALPRAZOLAM 0.25 MG/1
0.25 TABLET ORAL 2 TIMES DAILY PRN
Qty: 45 TABLET | Refills: 0 | Status: ON HOLD | OUTPATIENT
Start: 2023-11-02 | End: 2024-01-02

## 2023-11-02 RX ORDER — ZOLPIDEM TARTRATE 10 MG/1
TABLET ORAL
Qty: 30 TABLET | Refills: 5 | Status: SHIPPED | OUTPATIENT
Start: 2023-11-02

## 2023-11-02 NOTE — ASSESSMENT & PLAN NOTE
Continue pain medication as prescribed per PCP. RTC in 3 months for re-evaluation. Plan was disucssed with Dr. Gee who was immediately available in clinic. The patient was checked in the [facility] Board of Pharmacy's  Prescription Monitoring Program. There appears to be no incongruities with the patient's verbalized history.  If no improvement with pain medications patient will be referred to pain management for further evaluation.

## 2023-11-02 NOTE — ASSESSMENT & PLAN NOTE
Continue current medications.  reviewed. Discussed medication risks. Discussed insomnia condition course.  Advised of first-line medications for this condition.  Also discussed sleep hygiene.  Information was given below.  Good sleep habits (sometimes referred to as sleep hygiene) can help you get a good nights sleep.    Some habits that can improve your sleep health:  -Be consistent. Go to bed at the same time each night and get up at the same time each morning, including on the weekends  -Make sure your bedroom is quiet, dark, relaxing, and at a comfortable temperature  -Remove electronic devices, such as TVs, computers, and smart phones, from the bedroom  -Avoid large meals, caffeine, and alcohol before bedtime  -Get some exercise. Being physically active during the day can help you fall asleep more easily at night.
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
## 2023-11-02 NOTE — ASSESSMENT & PLAN NOTE
Continue Xanax. Discussed medication risks. The patient was checked in the [facility] Board of Pharmacy's  Prescription Monitoring Program. There appears to be no incongruities with the patient's verbalized history.     Please be advised of condition course.  SNRI/SSRI is first-line treatment for this condition.  Please be advised of the risk of discontinuing this medication without tapering/contacting MD.  Patient has been advised of side effects, and all questions were answered.  Patient voiced understanding.  Patient will follow up routinely and notify us if having any side effects or worsening or persistent symptoms.  ER precautions were given. Antidepressant/Antianxiety Medication Initiation:  Patient informed of risks, benefits, and potential side effects of medication and accepts informed consent.  Common side effects include nausea, fatigue, headache, insomnia, etc see medication insert for complete side effect profile.  Most importantly be advised of the possibility of new or worsening suicidal thoughts/depression/anxiety etcetera.  Please be advised to stop the medication immediately and seek urgent treatment if this occurs.  Therefore please do not to abruptly discontinue medication without physician guidance except in cases of sudden onset or worsening of SI.

## 2023-11-17 LAB
HUMAN PAPILLOMAVIRUS (HPV): NORMAL
PAP RECOMMENDATION EXT: NORMAL

## 2023-11-20 ENCOUNTER — PATIENT MESSAGE (OUTPATIENT)
Dept: FAMILY MEDICINE | Facility: CLINIC | Age: 58
End: 2023-11-20
Payer: COMMERCIAL

## 2023-11-20 DIAGNOSIS — Z13.220 ENCOUNTER FOR LIPID SCREENING FOR CARDIOVASCULAR DISEASE: ICD-10-CM

## 2023-11-20 DIAGNOSIS — Z79.899 ENCOUNTER FOR LONG-TERM (CURRENT) USE OF MEDICATIONS: Primary | ICD-10-CM

## 2023-11-20 DIAGNOSIS — Z00.00 WELL ADULT EXAM: ICD-10-CM

## 2023-11-20 DIAGNOSIS — Z13.6 ENCOUNTER FOR LIPID SCREENING FOR CARDIOVASCULAR DISEASE: ICD-10-CM

## 2023-11-21 NOTE — TELEPHONE ENCOUNTER
This patient wants labs before her appointment I only seen one lab that I could link is there anything else you would like to add.

## 2023-11-21 NOTE — TELEPHONE ENCOUNTER
I received your message which was reviewed along with the the medication list and allergies that we have below.  Please review it for accuracy to make sure that we have the most recent records on your history.     Based on this, the following orders were placed AND/OR medicines were sent in.     Orders Placed This Encounter   Procedures    CBC Without Differential     Standing Status:   Future     Standing Expiration Date:   1/19/2025    Comprehensive Metabolic Panel     Standing Status:   Future     Standing Expiration Date:   1/19/2025    TSH     Standing Status:   Future     Standing Expiration Date:   1/19/2025    Hemoglobin A1C     Standing Status:   Future     Standing Expiration Date:   1/19/2025    Lipid Panel     Standing Status:   Future     Standing Expiration Date:   1/19/2025       Medications written and sent at this time include:       Your pharmacy(ies) of choice at this time on record include the list below and any medications would have been sent to the one at the top.    [id number] - [facility] [address] [facility] [address]  [address]  [facility] [address]  Phone: [phone] Fax: [phone]    [facility] #[id number] - [place], [place] - [address] AT [facility] [address] [facility]  [address]  [facility] [address]  Phone: [phone] Fax: [phone]      Thank you for choosing us as your healthcare provider!  Dr. Buzz Gee    ALLERGY LIST  Review of patient's allergies indicates:   Allergen Reactions    Coconut Itching and Swelling    Coconut oil Photosensitivity    Lexapro [escitalopram]        MEDICATION LIST  Current Outpatient Medications on File Prior to Visit   Medication Sig Dispense Refill    acyclovir (ZOVIRAX) 400 MG tablet Take 1 tablet (400 mg total) by mouth 4 (four) times daily. (Patient not taking: Reported on 8/2/2023) 40 tablet 1    ALPRAZolam (XANAX) 0.25 MG tablet Take 1 tablet (0.25 mg total)  by mouth 2 (two) times daily as needed for Anxiety. 45 tablet 0    estradiol-norethindrone (COMBIPATCH) 0.05-0.14 mg/24 hr UNWRAP AND APPLY 1 PATCH ON THE SKIN TWICE A WEEK 8 patch 6    fluticasone propionate (FLONASE) 50 mcg/actuation nasal spray 2 sprays by Each Nostril route.      fluticasone-salmeterol diskus inhaler 250-50 mcg Inhale 1 puff into the lungs 2 (two) times daily. Controller 1 each 5    HYDROcodone-acetaminophen (NORCO)  mg per tablet Take 1 tablet by mouth 4 (four) times daily as needed for Pain. 120 tablet 0    levalbuterol (XOPENEX HFA) 45 mcg/actuation inhaler INHALE 2 PUFFS INTO THE LUNGS EVERY 4 HOURS AS NEEDED FOR WHEEZING 15 g 12    losartan-hydrochlorothiazide 50-12.5 mg (HYZAAR) 50-12.5 mg per tablet Take 1 tablet by mouth once daily. 90 tablet 3    meloxicam (MOBIC) 15 MG tablet Take 1 tablet (15 mg total) by mouth once daily. 30 tablet 5    pantoprazole (PROTONIX) 40 MG tablet Take 40 mg by mouth once daily.      zolpidem (AMBIEN) 10 mg Tab TAKE 1 TABLET BY MOUTH EVERY NIGHT AT BEDTIME 30 tablet 5     No current facility-administered medications on file prior to visit.       HEALTH MAINTENANCE THAT IS OVERDUE OR NEEDS TO BE UPDATED ON OUR CHART IS LISTED BELOW.  IF YOU HAVE HAD IT DONE ELSEWHERE, PLEASE SEND US DATES AND RECORDS IF YOU HAVE THEM TO MAKE YOUR CHART ACCURATE.  IF YOU HAVE NOT HAD THESE DONE AND ARE READY FOR US TO SCHEDULE THEM, PLEASE SEND US A MESSAGE.  Health Maintenance Due   Topic Date Due    Shingles Vaccine (1 of 2) Never done    Mammogram  08/31/2023    COVID-19 Vaccine (3 - 2023-24 season) 09/01/2023       DISCLAIMER: This note was compiled by using a speech recognition dictation system and therefore please be aware that typographical / speech recognition errors can and do occur.  Please contact me if you see any errors specifically.    Buzz Gee MD  We Offer Telehealth & Same Day Appointments!   Book your Telehealth appointment with me through my  nurse or   Clinic appointments on [employer]!  [phone]     Check out my Facebook Page and Follow Me at: CLICK HERE    Check out my website at [employer] by clicking on: CLICK HERE    To Schedule appointments online, go to [employer]: CLICK HERE     Location: [email or web address]    [address] [place] [address]    FAX: [phone]

## 2023-12-07 ENCOUNTER — HOSPITAL ENCOUNTER (OUTPATIENT)
Dept: RADIOLOGY | Facility: HOSPITAL | Age: 58
Discharge: HOME | End: 2023-12-07
Attending: NURSE PRACTITIONER
Payer: COMMERCIAL

## 2023-12-07 ENCOUNTER — OFFICE VISIT (OUTPATIENT)
Dept: FAMILY MEDICINE | Facility: CLINIC | Age: 58
End: 2023-12-07
Payer: COMMERCIAL

## 2023-12-07 ENCOUNTER — TELEPHONE (OUTPATIENT)
Dept: FAMILY MEDICINE | Facility: CLINIC | Age: 58
End: 2023-12-07

## 2023-12-07 VITALS
HEART RATE: 77 BPM | WEIGHT: 143.5 LBS | SYSTOLIC BLOOD PRESSURE: 117 MMHG | DIASTOLIC BLOOD PRESSURE: 76 MMHG | HEIGHT: 67 IN | RESPIRATION RATE: 18 BRPM | BODY MASS INDEX: 22.52 KG/M2

## 2023-12-07 DIAGNOSIS — R06.02 SHORTNESS OF BREATH: ICD-10-CM

## 2023-12-07 DIAGNOSIS — U07.1 COVID-19: Primary | ICD-10-CM

## 2023-12-07 DIAGNOSIS — R05.1 ACUTE COUGH: ICD-10-CM

## 2023-12-07 DIAGNOSIS — R93.89 ABNORMAL CHEST X-RAY: ICD-10-CM

## 2023-12-07 LAB
CTP QC/QA: YES
CTP QC/QA: YES
FLUAV AG NPH QL: NEGATIVE
FLUBV AG NPH QL: NEGATIVE
SARS-COV-2 RDRP RESP QL NAA+PROBE: POSITIVE

## 2023-12-07 PROCEDURE — 87804 POCT INFLUENZA A/B: ICD-10-PCS | Mod: QW,S$GLB,, | Performed by: NURSE PRACTITIONER

## 2023-12-07 PROCEDURE — 1159F PR MEDICATION LIST DOCUMENTED IN MEDICAL RECORD: ICD-10-PCS | Mod: CPTII,S$GLB,, | Performed by: NURSE PRACTITIONER

## 2023-12-07 PROCEDURE — 71046 X-RAY EXAM CHEST 2 VIEWS: CPT | Mod: 26,,, | Performed by: RADIOLOGY

## 2023-12-07 PROCEDURE — 99999 PR PBB SHADOW E&M-EST. PATIENT-LVL V: ICD-10-PCS | Mod: PBBFAC,,, | Performed by: NURSE PRACTITIONER

## 2023-12-07 PROCEDURE — 71046 X-RAY EXAM CHEST 2 VIEWS: CPT | Mod: TC,PO

## 2023-12-07 PROCEDURE — 87635 SARS-COV-2 COVID-19 AMP PRB: CPT | Mod: QW,S$GLB,, | Performed by: NURSE PRACTITIONER

## 2023-12-07 PROCEDURE — 1159F MED LIST DOCD IN RCRD: CPT | Mod: CPTII,S$GLB,, | Performed by: NURSE PRACTITIONER

## 2023-12-07 PROCEDURE — 3008F PR BODY MASS INDEX (BMI) DOCUMENTED: ICD-10-PCS | Mod: CPTII,S$GLB,, | Performed by: NURSE PRACTITIONER

## 2023-12-07 PROCEDURE — 3078F PR MOST RECENT DIASTOLIC BLOOD PRESSURE < 80 MM HG: ICD-10-PCS | Mod: CPTII,S$GLB,, | Performed by: NURSE PRACTITIONER

## 2023-12-07 PROCEDURE — 71046 XR CHEST PA AND LATERAL: ICD-10-PCS | Mod: 26,,, | Performed by: RADIOLOGY

## 2023-12-07 PROCEDURE — 3074F PR MOST RECENT SYSTOLIC BLOOD PRESSURE < 130 MM HG: ICD-10-PCS | Mod: CPTII,S$GLB,, | Performed by: NURSE PRACTITIONER

## 2023-12-07 PROCEDURE — 3044F HG A1C LEVEL LT 7.0%: CPT | Mod: CPTII,S$GLB,, | Performed by: NURSE PRACTITIONER

## 2023-12-07 PROCEDURE — 99999 PR PBB SHADOW E&M-EST. PATIENT-LVL V: CPT | Mod: PBBFAC,,, | Performed by: NURSE PRACTITIONER

## 2023-12-07 PROCEDURE — 87635: ICD-10-PCS | Mod: QW,S$GLB,, | Performed by: NURSE PRACTITIONER

## 2023-12-07 PROCEDURE — 3074F SYST BP LT 130 MM HG: CPT | Mod: CPTII,S$GLB,, | Performed by: NURSE PRACTITIONER

## 2023-12-07 PROCEDURE — 3078F DIAST BP <80 MM HG: CPT | Mod: CPTII,S$GLB,, | Performed by: NURSE PRACTITIONER

## 2023-12-07 PROCEDURE — 87804 INFLUENZA ASSAY W/OPTIC: CPT | Mod: QW,S$GLB,, | Performed by: NURSE PRACTITIONER

## 2023-12-07 PROCEDURE — 99214 OFFICE O/P EST MOD 30 MIN: CPT | Mod: S$GLB,,, | Performed by: NURSE PRACTITIONER

## 2023-12-07 PROCEDURE — 99214 PR OFFICE/OUTPT VISIT, EST, LEVL IV, 30-39 MIN: ICD-10-PCS | Mod: S$GLB,,, | Performed by: NURSE PRACTITIONER

## 2023-12-07 PROCEDURE — 3008F BODY MASS INDEX DOCD: CPT | Mod: CPTII,S$GLB,, | Performed by: NURSE PRACTITIONER

## 2023-12-07 PROCEDURE — 3044F PR MOST RECENT HEMOGLOBIN A1C LEVEL <7.0%: ICD-10-PCS | Mod: CPTII,S$GLB,, | Performed by: NURSE PRACTITIONER

## 2023-12-07 RX ORDER — PROMETHAZINE HYDROCHLORIDE 25 MG/1
TABLET ORAL
COMMUNITY
Start: 2023-12-05 | End: 2023-12-19

## 2023-12-07 RX ORDER — DICYCLOMINE HYDROCHLORIDE 20 MG/1
20 TABLET ORAL EVERY 6 HOURS PRN
COMMUNITY
Start: 2023-12-05 | End: 2023-12-19

## 2023-12-07 RX ORDER — PREDNISONE 20 MG/1
40 TABLET ORAL DAILY
Qty: 10 TABLET | Refills: 0 | Status: SHIPPED | OUTPATIENT
Start: 2023-12-07 | End: 2023-12-12

## 2023-12-07 RX ORDER — SOD SULF/POT CHLORIDE/MAG SULF 1.479 G
TABLET ORAL
COMMUNITY
Start: 2023-12-05 | End: 2023-12-19

## 2023-12-07 NOTE — PROGRESS NOTES
Assessment/Plan:  Problem List Items Addressed This Visit    None  Visit Diagnoses       COVID-19    -  Primary    Relevant Medications    predniSONE (DELTASONE) 20 MG tablet    Acute cough        Relevant Orders    POCT COVID-19 Rapid Screening (Completed)    POCT Influenza A/B (Completed)    Shortness of breath        Relevant Orders    X-Ray Chest PA And Lateral (Completed)    CT Chest Without Contrast    Abnormal chest x-ray        Relevant Orders    CT Chest Without Contrast        Covid- positive   Abnormal CXR today- see below  Recommend further evaluation with CT chest   Trial of steroids as prescribed   Offered cough medicine, patient states will try OTC remedies first   Supportive care- rest, increase hydration with water, OTC Tylenol/Ibuprofen for pain/fever.  Advised to quarantine for 5 full days and notify those you have tested positive for covid. Take precautions until day 10.   Follow up with pulmonology   Follow up if symptoms worsen or fail to improve.  ER precautions for severe or worsening symptoms.     Kylie Damico NP  _____________________________________________________________________________________________________________________________________________________    CC: sinus problem     HPI: Patient is a 57-year-old female who presents in clinic today as an established patient here for sinus problem. This is a new problem. The current episode started x4 days ago. The problem is gradually worsening. There has been fever. Associated symptoms include congestion, postnasal drip, cough (mild), shortness of breath, body aches. She has a history of pneumonia, bronchiectasis w/ asthmatic component. She uses Xopenex and Advair inhalers. She is following with pulmonology. Pertinent negatives include no chills, diaphoresis, ear pain, headaches, hoarse voice, neck pain, sneezing, sore throat or swollen glands. Past treatments include advil. The treatment provided no relief. She has had no known sick  contacts. She has recently traveled to [place], [place]. She has never had covid. She did receive initial covid vaccines, no booster.     X-Ray Chest PA And Lateral  Narrative: EXAM: XR CHEST PA AND LATERAL    CLINICAL HISTORY:  Shortness of breath    TECHNIQUE: 2 view chest    PRIOR:  January 2023    FINDINGS:  Heart size remains normal.  Early aortic arch calcifications are present.  A nodular type interstitial change is again present in the right lower lobe region but has not significantly changed from the previous radiograph in January.  Bones normal for age.  Impression:  Stable exam with persistent reticulonodular changes in the right lower lobe as described (consider high-resolution CT?)    Finalized on: 12/7/2023 12:46 PM By:  Berlin Valentino MD  [facility]# [id number]      2023-12-07 12:48:09.241    BRALEXG    Past Medical History:  Past Medical History:   Diagnosis Date    Bronchiolectasis 01/2021    Bulging disc     Degenerative disc disease, cervical     Degenerative disc disease, lumbar     Fibrocystic breast      Past Surgical History:   Procedure Laterality Date    MANDIBLE FRACTURE SURGERY       Review of patient's allergies indicates:   Allergen Reactions    Coconut Itching and Swelling    Coconut oil Photosensitivity    Lexapro [escitalopram]     Serotonin 5ht-3 antagonists Other (See Comments)     Social History     Tobacco Use    Smoking status: Never     Passive exposure: Never    Smokeless tobacco: Never   Substance Use Topics    Alcohol use: No    Drug use: No     Family History   Problem Relation Age of Onset    Hypertension Mother     Diabetes Mother     Cancer Father     Breast cancer Paternal Aunt     Colon cancer Neg Hx     Ovarian cancer Neg Hx      Current Outpatient Medications on File Prior to Visit   Medication Sig Dispense Refill    ALPRAZolam (XANAX) 0.25 MG tablet Take 1 tablet (0.25 mg total) by mouth 2 (two) times daily as needed for Anxiety. 45 tablet 0    estradiol-norethindrone (COMBIPATCH)  0.05-0.14 mg/24 hr UNWRAP AND APPLY 1 PATCH ON THE SKIN TWICE A WEEK 8 patch 6    fluticasone propionate (FLONASE) 50 mcg/actuation nasal spray 2 sprays by Each Nostril route.      fluticasone-salmeterol diskus inhaler 250-50 mcg Inhale 1 puff into the lungs 2 (two) times daily. Controller 1 each 5    HYDROcodone-acetaminophen (NORCO)  mg per tablet Take 1 tablet by mouth 4 (four) times daily as needed for Pain. 120 tablet 0    levalbuterol (XOPENEX HFA) 45 mcg/actuation inhaler INHALE 2 PUFFS INTO THE LUNGS EVERY 4 HOURS AS NEEDED FOR WHEEZING 15 g 12    losartan-hydrochlorothiazide 50-12.5 mg (HYZAAR) 50-12.5 mg per tablet Take 1 tablet by mouth once daily. 90 tablet 3    meloxicam (MOBIC) 15 MG tablet Take 1 tablet (15 mg total) by mouth once daily. 30 tablet 5    pantoprazole (PROTONIX) 40 MG tablet Take 40 mg by mouth once daily.      zolpidem (AMBIEN) 10 mg Tab TAKE 1 TABLET BY MOUTH EVERY NIGHT AT BEDTIME 30 tablet 5    acyclovir (ZOVIRAX) 400 MG tablet Take 1 tablet (400 mg total) by mouth 4 (four) times daily. (Patient not taking: Reported on 8/2/2023) 40 tablet 1    dicyclomine (BENTYL) 20 mg tablet Take 20 mg by mouth every 6 (six) hours as needed.      promethazine (PHENERGAN) 25 MG tablet Take by mouth.      SUTAB 1.479-0.188- 0.225 gram tablet Take by mouth.       No current facility-administered medications on file prior to visit.     Review of Systems   Constitutional:  Positive for fatigue and fever. Negative for appetite change and chills.   HENT:  Positive for congestion and postnasal drip. Negative for rhinorrhea and sore throat.    Eyes:  Negative for visual disturbance.   Respiratory:  Positive for cough and shortness of breath. Negative for wheezing.    Cardiovascular:  Negative for chest pain, palpitations and leg swelling.   Gastrointestinal:  Negative for abdominal pain, diarrhea and vomiting.   Genitourinary:  Negative for difficulty urinating, dysuria and hematuria.  "  Musculoskeletal:  Positive for arthralgias and myalgias.   Skin:  Negative for rash and wound.   Neurological:  Negative for dizziness and headaches.   Psychiatric/Behavioral:  Negative for behavioral problems. The patient is not nervous/anxious.      Vitals:    12/07/23 0920   BP: 117/76   Pulse: 77   Resp: 18   Weight: 65.1 kg (143 lb 8 oz)   Height: 5' 7" (1.702 m)     Wt Readings from Last 3 Encounters:   12/07/23 65.1 kg (143 lb 8 oz)   11/02/23 64.9 kg (143 lb)   08/02/23 64 kg (141 lb)     Physical Exam  Vitals reviewed.   Constitutional:       General: She is not in acute distress.     Appearance: Normal appearance. She is not ill-appearing.   HENT:      Head: Normocephalic and atraumatic.      Right Ear: Tympanic membrane, ear canal and external ear normal.      Left Ear: Tympanic membrane, ear canal and external ear normal.      Nose: Congestion present.      Mouth/Throat:      Mouth: Mucous membranes are moist.      Pharynx: Posterior oropharyngeal erythema (mild) present.   Eyes:      Extraocular Movements: Extraocular movements intact.      Conjunctiva/sclera: Conjunctivae normal.   Cardiovascular:      Rate and Rhythm: Normal rate.      Heart sounds: Normal heart sounds.   Pulmonary:      Effort: Pulmonary effort is normal. No respiratory distress.      Breath sounds: No wheezing, rhonchi or rales.   Abdominal:      General: Abdomen is flat. There is no distension.   Musculoskeletal:         General: Normal range of motion.      Cervical back: Normal range of motion and neck supple.   Lymphadenopathy:      Cervical: No cervical adenopathy.   Skin:     General: Skin is warm and dry.      Capillary Refill: Capillary refill takes less than 2 seconds.      Coloration: Skin is not pale.      Findings: No rash.   Neurological:      General: No focal deficit present.      Mental Status: She is alert and oriented to person, place, and time. Mental status is at baseline.   Psychiatric:         Mood and " Affect: Mood normal.         Speech: Speech normal. Speech is not rapid and pressured, delayed or slurred.         Behavior: Behavior normal. Behavior is not agitated, slowed, aggressive, withdrawn, hyperactive or combative. Behavior is cooperative.         Thought Content: Thought content normal.         Judgment: Judgment normal.       Health Maintenance   Topic Date Due    Shingles Vaccine (1 of 2) Never done    Mammogram  08/31/2023    TETANUS VACCINE  04/25/2025    Lipid Panel  04/26/2028    Colorectal Cancer Screening  11/23/2030    Hepatitis C Screening  Completed

## 2023-12-07 NOTE — TELEPHONE ENCOUNTER
Returned call and told pt about her lab results. Pt made CT appointment and is following up with Pulmonology

## 2023-12-07 NOTE — TELEPHONE ENCOUNTER
----- Message from Liliana Bliss sent at 12/7/2023  1:45 PM CST -----  Regarding: missed call  Name of who is calling:   Wendie      What is the request in detail: Pt is requesting a call back from Cele in ref to missed call from your office      Can the clinic reply by [facility]:no      What number to call back if not [facility]: [phone]

## 2023-12-12 ENCOUNTER — TELEPHONE (OUTPATIENT)
Dept: FAMILY MEDICINE | Facility: CLINIC | Age: 58
End: 2023-12-12
Payer: COMMERCIAL

## 2023-12-12 NOTE — TELEPHONE ENCOUNTER
----- Message from Mera Hooper sent at 12/12/2023  2:09 PM CST -----  Contact: [phone]  Type:  Same Day Appointment Request    Caller is requesting a same day appointment.  Caller declined first available appointment listed below.    Name of Caller:Wendie   When is the first available appointment?n/a  Symptoms: shortness of breath  Best Call Back Number:[phone]   Additional Information: n/a      Thanks KB

## 2023-12-15 ENCOUNTER — PATIENT MESSAGE (OUTPATIENT)
Dept: FAMILY MEDICINE | Facility: CLINIC | Age: 58
End: 2023-12-15
Payer: COMMERCIAL

## 2023-12-19 ENCOUNTER — HOSPITAL ENCOUNTER (OUTPATIENT)
Dept: RADIOLOGY | Facility: HOSPITAL | Age: 58
Discharge: HOME | End: 2023-12-19
Attending: INTERNAL MEDICINE
Payer: COMMERCIAL

## 2023-12-19 DIAGNOSIS — J47.9 BRONCHIECTASIS WITHOUT COMPLICATION: ICD-10-CM

## 2023-12-19 PROBLEM — U07.1 COVID-19 VIRUS INFECTION: Status: ACTIVE | Noted: 2023-12-19

## 2023-12-19 PROBLEM — N17.9 AKI (ACUTE KIDNEY INJURY): Status: ACTIVE | Noted: 2023-12-19

## 2023-12-19 PROBLEM — E87.6 HYPOKALEMIA: Status: ACTIVE | Noted: 2023-12-19

## 2023-12-19 PROBLEM — J18.9 PNEUMONIA OF RIGHT LOWER LOBE DUE TO INFECTIOUS ORGANISM: Status: ACTIVE | Noted: 2023-12-19

## 2023-12-19 PROCEDURE — 71046 X-RAY EXAM CHEST 2 VIEWS: CPT | Mod: TC,PO

## 2023-12-19 PROCEDURE — 71046 XR CHEST PA AND LATERAL: ICD-10-PCS | Mod: 26,,, | Performed by: RADIOLOGY

## 2023-12-19 PROCEDURE — 71046 X-RAY EXAM CHEST 2 VIEWS: CPT | Mod: 26,,, | Performed by: RADIOLOGY

## 2023-12-22 PROBLEM — E87.6 HYPOKALEMIA: Status: RESOLVED | Noted: 2023-12-19 | Resolved: 2023-12-22

## 2023-12-28 PROBLEM — Z75.8 DISCHARGE PLANNING ISSUES: Status: ACTIVE | Noted: 2023-12-28

## 2023-12-28 PROBLEM — Z02.9 DISCHARGE PLANNING ISSUES: Status: ACTIVE | Noted: 2023-12-28

## 2023-12-29 PROBLEM — E83.42 HYPOMAGNESEMIA: Status: ACTIVE | Noted: 2023-12-29

## 2024-01-04 ENCOUNTER — OFFICE VISIT (OUTPATIENT)
Dept: FAMILY MEDICINE | Facility: CLINIC | Age: 59
End: 2024-01-04
Payer: COMMERCIAL

## 2024-01-04 VITALS
SYSTOLIC BLOOD PRESSURE: 118 MMHG | HEIGHT: 67 IN | DIASTOLIC BLOOD PRESSURE: 64 MMHG | OXYGEN SATURATION: 100 % | WEIGHT: 135.38 LBS | HEART RATE: 92 BPM | BODY MASS INDEX: 21.25 KG/M2

## 2024-01-04 DIAGNOSIS — Z79.899 ENCOUNTER FOR LONG-TERM (CURRENT) USE OF MEDICATIONS: ICD-10-CM

## 2024-01-04 DIAGNOSIS — Z09 HOSPITAL DISCHARGE FOLLOW-UP: Primary | ICD-10-CM

## 2024-01-04 DIAGNOSIS — J18.9 PNEUMONIA DUE TO INFECTIOUS ORGANISM, UNSPECIFIED LATERALITY, UNSPECIFIED PART OF LUNG: ICD-10-CM

## 2024-01-04 DIAGNOSIS — D64.9 ANEMIA, UNSPECIFIED TYPE: ICD-10-CM

## 2024-01-04 PROCEDURE — 3078F DIAST BP <80 MM HG: CPT | Mod: CPTII,S$GLB,, | Performed by: FAMILY MEDICINE

## 2024-01-04 PROCEDURE — 3008F BODY MASS INDEX DOCD: CPT | Mod: CPTII,S$GLB,, | Performed by: FAMILY MEDICINE

## 2024-01-04 PROCEDURE — 1111F DSCHRG MED/CURRENT MED MERGE: CPT | Mod: CPTII,S$GLB,, | Performed by: FAMILY MEDICINE

## 2024-01-04 PROCEDURE — 3074F SYST BP LT 130 MM HG: CPT | Mod: CPTII,S$GLB,, | Performed by: FAMILY MEDICINE

## 2024-01-04 PROCEDURE — 99215 OFFICE O/P EST HI 40 MIN: CPT | Mod: S$GLB,,, | Performed by: FAMILY MEDICINE

## 2024-01-04 PROCEDURE — 1160F RVW MEDS BY RX/DR IN RCRD: CPT | Mod: CPTII,S$GLB,, | Performed by: FAMILY MEDICINE

## 2024-01-04 PROCEDURE — 99999 PR PBB SHADOW E&M-EST. PATIENT-LVL V: CPT | Mod: PBBFAC,,, | Performed by: FAMILY MEDICINE

## 2024-01-04 PROCEDURE — 1159F MED LIST DOCD IN RCRD: CPT | Mod: CPTII,S$GLB,, | Performed by: FAMILY MEDICINE

## 2024-01-04 NOTE — PATIENT INSTRUCTIONS
Gilberto Pressley,     If you are due for any health screening(s) below please notify me so we can arrange them to be ordered and scheduled. Most healthy patients at your age complete them, but you are free to accept or refuse.     If you can't do it, I'll definitely understand. If you can, I'd certainly appreciate it!    Tests to Keep You Healthy    Mammogram: ORDERED BUT NOT SCHEDULED  Colon Cancer Screening: Met on 11/23/2020  Cervical Cancer Screening: Met on 5/28/2019  Last Blood Pressure <= 139/89 (1/4/2024): Yes      Schedule your breast cancer screening today     Breast cancer is the second most common cancer in women,  and the second leading cause of death from cancer. Mammograms can detect breast cancer early, which significantly increases the chances of curing the cancer.       Our records indicate that you may be overdue for breast cancer screening. Cancer screenings save lives, so schedule yours today to stay healthy.     If you recently had a mammogram performed outside of [facility], please let your Health care team know so that they can update your health record.

## 2024-01-04 NOTE — ASSESSMENT & PLAN NOTE
Offered home health.  Patient declines.  Offered outpatient physical therapy.  Patient declines.Transitional Care Note    Family and/or Caretaker present at visit?  Yes.  Diagnostic tests reviewed/disposition: I have reviewed all completed as well as pending diagnostic tests at the time of discharge.  Disease/illness education: PNA  Home health/community services discussion/referrals: Patient does not have home health established from hospital visit.  They do not need home health.  If needed, we will set up home health for the patient.   Establishment or re-establishment of referral orders for community resources: No other necessary community resources.   Discussion with other health care providers: No discussion with other health care providers necessary.

## 2024-01-04 NOTE — PROGRESS NOTES
PLAN:      Problem List Items Addressed This Visit       Encounter for long-term (current) use of medications (Chronic)     Complete history and physical was completed today.  Complete and thorough medication reconciliation was performed.  Discussed risks and benefits of medications.  Advised patient on orders and health maintenance.  We discussed old records and old labs if available.  Will request any records not available through epic.  Continue current medications listed on your summary sheet.           Relevant Orders    CBC Auto Differential    Iron and TIBC    Ferritin    Vitamin B12    Folate    Comprehensive Metabolic Panel    Pneumonia due to infectious organism       Continue current medications.         Relevant Orders    Comprehensive Metabolic Panel    Hospital discharge follow-up - Primary       Offered home health.  Patient declines.  Offered outpatient physical therapy.  Patient declines.Transitional Care Note    Family and/or Caretaker present at visit?  Yes.  Diagnostic tests reviewed/disposition: I have reviewed all completed as well as pending diagnostic tests at the time of discharge.  Disease/illness education: PNA  Home health/community services discussion/referrals: Patient does not have home health established from hospital visit.  They do not need home health.  If needed, we will set up home health for the patient.   Establishment or re-establishment of referral orders for community resources: No other necessary community resources.   Discussion with other health care providers: No discussion with other health care providers necessary.                Relevant Orders    CBC Auto Differential    Iron and TIBC    Ferritin    Vitamin B12    Folate    Comprehensive Metabolic Panel    Anemia       Significant anemia.  Check levels.  Anemia precautions.   Consider hematology.         Relevant Orders    CBC Auto Differential    Iron and TIBC    Ferritin    Vitamin B12    Folate    Comprehensive  Metabolic Panel     Future Appointments       Date Provider Specialty Appt Notes    2/2/2024 Buzz Gee MD Family Medicine annual/ med refill           Medication Management for assessment above:   Medication List with Changes/Refills   Current Medications    ALOE VERA ORAL    Take 1 tablet by mouth daily as needed (constipation).    ALPRAZOLAM (XANAX) 0.25 MG TABLET    Take 1 tablet (0.25 mg total) by mouth 2 (two) times daily as needed for Anxiety.    AMOXICILLIN-CLAVULANATE 875-125MG (AUGMENTIN) 875-125 MG PER TABLET    Take 1 tablet by mouth every 12 (twelve) hours. for 15 days    ASCORBIC ACID, VITAMIN C, (VITAMIN C) 500 MG TABLET    Take 500 mg by mouth once daily.    ASPIRIN (ECOTRIN) 81 MG EC TABLET    Take 1 tablet (81 mg total) by mouth once daily.    BENZONATATE (TESSALON) 100 MG CAPSULE    Take 1 capsule (100 mg total) by mouth 3 (three) times daily as needed for Cough.    DOXYCYCLINE (VIBRA-TABS) 100 MG TABLET    Take 1 tablet (100 mg total) by mouth every 12 (twelve) hours. for 15 days    ESTRADIOL-NORETHINDRONE (COMBIPATCH) 0.05-0.14 MG/24 HR    UNWRAP AND APPLY 1 PATCH ON THE SKIN TWICE A WEEK    FLUTICASONE-SALMETEROL DISKUS INHALER 250-50 MCG    Inhale 1 puff into the lungs 2 (two) times daily. Controller    HYDROCODONE-ACETAMINOPHEN (NORCO)  MG PER TABLET    Take 1 tablet by mouth 4 (four) times daily as needed for Pain.    LACTOBACILLUS RHAMNOSUS GG (CULTURELLE) 10 BILLION CELL CAPSULE    Take 1 capsule by mouth once daily.    LEVALBUTEROL (XOPENEX HFA) 45 MCG/ACTUATION INHALER    INHALE 2 PUFFS INTO THE LUNGS EVERY 4 HOURS AS NEEDED FOR WHEEZING    LEVALBUTEROL (XOPENEX) 1.25 MG/3 ML NEBULIZER SOLUTION    1 ampule 3 (three) times daily.    LIDOCAINE 4 % PTMD    Apply 1 patch topically daily as needed (back/neck pain).    POLYETHYLENE GLYCOL (GLYCOLAX) 17 GRAM/DOSE POWDER    Take 17 g by mouth daily as needed for Constipation.    [employer] AEROSOL DELIVERY SYSTEM JOHN    by  Misc.(Non-Drug; Combo Route) route 3 (three) times daily. Use with Nebulizer solution as directed    ZOLPIDEM (AMBIEN) 10 MG TAB    TAKE 1 TABLET BY MOUTH EVERY NIGHT AT BEDTIME       Buzz Gee M.D.  ==========================================================================  Subjective:   Patient ID: Wenide Reeves is a 58 y.o. female.  has a past medical history of Bronchiolectasis (01/2021), Bulging disc, Degenerative disc disease, cervical, Degenerative disc disease, lumbar, and Fibrocystic breast.   Chief Complaint: Follow-up      Problem List Items Addressed This Visit       Encounter for long-term (current) use of medications (Chronic)    Overview     January 2024: Reviewed labs.November 2023: Reviewed labs.  August 2023: Reviewed labs. May 2023: Reviewed labs.  Patient reports that she recently had labs to her cardiologist office.  She will get a copy of this.  November 2022:  Reviewed labs.  CHRONIC. Stable. Compliant with medications for managed conditions. See medication list. No SE reported.   Routine lab analysis is being monitored. Refills were addressed.  September 2021:  Reviewed outside labs.  Patient gets labs performed at [facility] in [place] where she works at.  March 2022:  Reviewed outside labs.  June 2022:  Patient is due for labs.  She will have these performed at outside lab in [place].  August 2022:  Outside labs reviewed.  Sent off for scanning.  Lab Results   Component Value Date    WBC 6.74 04/26/2023    HGB 13.3 04/26/2023    HCT 40.8 04/26/2023    MCV 92 04/26/2023     04/26/2023       Chemistry        Component Value Date/Time     04/26/2023 1035     04/30/2019 0000    K 4.2 04/26/2023 1035    K 4.0 04/30/2019 0000     04/26/2023 1035     04/30/2019 0000    CO2 28 04/26/2023 1035    CO2 30 04/30/2019 0000    BUN 19 (H) 04/26/2023 1035    BUN 15.0 04/30/2019 0000    CREATININE 0.66 04/26/2023 1035    CREATININE 0.7 04/30/2019 0000     GLU 84 04/26/2023 1035        Component Value Date/Time    CALCIUM 9.5 04/26/2023 1035    CALCIUM 9.5 04/30/2019 0000    ALKPHOS 87 04/26/2023 1035    AST 29 04/26/2023 1035    AST 21 04/30/2019 0000    ALT 16 04/26/2023 1035    ALT 13 04/30/2019 0000    BILITOT 0.4 04/26/2023 1035    ESTGFRAFRICA 95 08/16/2022 0819        Lab Results   Component Value Date    TSH 1.240 04/26/2023    FREET4 0.79 04/26/2023          Current Assessment & Plan     Complete history and physical was completed today.  Complete and thorough medication reconciliation was performed.  Discussed risks and benefits of medications.  Advised patient on orders and health maintenance.  We discussed old records and old labs if available.  Will request any records not available through epic.  Continue current medications listed on your summary sheet.           Pneumonia due to infectious organism    Overview       Patient status post pneumonia.  She is on Augmentin and doxycycline until January 16th.         Current Assessment & Plan       Continue current medications.         Hospital discharge follow-up - Primary    Overview     [facility] Medicine  Discharge Summary        Patient Name: Wendie Reeves  MRN: [id number]  JUAN ANTONIO: [id number]  Patient Class: IP- Inpatient  Admission Date: 12/19/2023  Hospital Length of Stay: 14 days  Discharge Date and Time:  01/02/2024 4:13 PM  Attending Physician: Dr. Gaming  Discharging Provider: Randell Gaming MD  Primary Care Provider: Buzz Gee MD     Primary Care Team: Networked reference to record PCT      HPI:   Patient is a 58-year-old female with a past medical history of hypertension, dyslipidemia CAD presents complaints shortness a breath.  Patient states has been going on for over a week.  She states that she developed COVID for which she tested positive at home.  She states that she was noted to have some pleuritic chest pain which was worse on the right side.  She  complains of subjective fever or chills.  She continue pain therefore went to an urgent care where she was placed on antibiotics.  She was seen by bossman jackson as an outpatient who ordered a chest x-ray which he was suggestive of right sided pneumonia.  Antibiotics were adjusted.  Patient presents to the ED for further evaluation due to on going shortness breath.  Upon evaluation in the ED, patient was noted to have TYLER.  She was started on IV fluids.  She was also started on IV antibiotics.  Hospital Medicine was consulted evaluation.     * No surgery found *       Hospital Course:   The patient was admitted to the ICU for further evaluation and treatment pf acute hypoxemic respiratory failure and pneumonia. Antibiotics were continued. Pulmonology and ID were consulted. CT Chest showed multifocal lung consolidation, greatest in the right lower lobe which is nearly completely opacified. Initially her O2 demand improved, but worsened on the 20th. Additional workup was obtained (fungal culture to sputum culture, fungitell, urine histo/blasto/crypto). Antibiotics were adjusted and Amphotericin was added.  The patient has since experienced slow improvement, and antibiotics have been adjusted further. She suffered a decrement in renal function due to urinary retention that improved after de guzman catheter ws placed. Urology recommended voiding trial in 5-7 days from de guzman placement. Patient was transferred to the floor. PT/OT recommending HH at [place], but patient did not wish to have HH. She continued to slowly improving. She was transitioned to oral antibiotics per ID recs which she will complete on 1/16. She was set up with home oxygen. She was cleared for discharge by pulmonary. De Guzman catheter was removed and she underwent successful voiding trial. She was discharged home in stable condition with outpatient follow up.      Goals of Care Treatment Preferences:  Code Status: Full Code        Consults:   Consults (From admission,  onward)            Status Ordering Provider       Inpatient consult to Social Work  Once        Provider:  (Not yet assigned)    Acknowledged JUAN A HINOJOSA       Inpatient consult to Urology  Once        Provider:  Aden Uribe MD    Completed MONI MELENDEZ       Inpatient consult to PICC team ([facility])  Once        Provider:  (Not yet assigned)    Completed KATHRYN WHYTE       Inpatient consult to Infectious Diseases  Once        Provider:  Rosa Harding MD    Completed KATHRYN WHYTE       Inpatient consult to Pulmonology  Once        Provider:  Kathryn Wyhte MD    Completed JUAN A HINOJOSA                No new Assessment & Plan notes have been filed under this hospital service since the last note was generated.  Service: Hospital Medicine            Final Active Diagnoses:     Diagnosis Date Noted POA    PRINCIPAL PROBLEM:  Pneumonia of right lower lobe due to infectious organism [J18.9] 12/19/2023 Yes    Hypomagnesemia [E83.42] 12/29/2023 Yes    Discharge planning issues [Z02.9] 12/28/2023 Not Applicable    COVID-19 virus infection [U07.1] 12/19/2023 Yes    TYLER (acute kidney injury) [N17.9] 12/19/2023 Yes    Hypokalemia [E87.6] 12/19/2023 Yes    Hypertension, essential [I10] 06/15/2021 Yes       Chronic       Problems Resolved During this Admission:         Discharged Condition: stable     Disposition: Home or Self Care     Follow Up:    Contact information for follow-up providers         Buzz Gee MD Follow up.    Specialty: Family Medicine  Why: please go to your hospital followup appointment on Thursday, January 4, 2024 at 1pm  Contact information:  [address] [facility] [address] [phone]                    [facility] TRANSITIONAL CARE Follow up.    Why: Someone will contact you within 24-48 hours, Please answer a call from, [phone]  Contact information:  [address] [place] [address] 1  [facility] [id number]                 Kathryn Whyte MD Follow up in  "1 week(s).    Specialty: Pulmonary Disease  Why: please go to your hospital followup appointment on Tuesday, January 16, 2024 at 2pm with Anish Sanders NP. Puja will place patient on cancellation list if a sooner appointment becomes available.  Contact information:  [address]  [address]  [facility] [address]  [phone]                              Contact information for after-discharge care         Durable Medical Equipment         [facility] .    Service: Durable Medical Equipment  Contact information:  [address] [place] [address] [phone]                                          Patient Instructions:       WALKER FOR HOME USE      Order Specific Question Answer Comments   Type of Walker: Adult (5'4"-6'6")     With wheels? Yes     Height: 5' 7" (1.702 m)     Weight: 64.8 kg (142 lb 13.7 oz)     Length of need (1-99 months): 99     Does patient have medical equipment at home? none     Please check all that apply: Patient's condition impairs ambulation.        WALKER FOR HOME USE      Order Specific Question Answer Comments   Type of Walker: Adult (5'4"-6'6")     With wheels? Yes     Height: 5' 7" (1.702 m)     Weight: 64.8 kg (142 lb 13.7 oz)     Length of need (1-99 months): 99     Does patient have medical equipment at home? none     Please check all that apply: Patient's condition impairs ambulation.     Please check all that apply: Patient is unable to safely ambulate without equipment.        OXYGEN FOR HOME USE      Order Specific Question Answer Comments   Liter Flow 3 1   Duration Continuous     Qualifying Test Performed at: Activity     Oxygen saturation at rest 94     Oxygen saturation with activity 85     Oxygen saturation with activity on oxygen 92     Portable mode: continuous     Route nasal cannula     Device: home concentrator with portable tanks     Length of need (in months): 3 mos     Patient condition with qualifying saturation Other - List " "qualifying diagnosis and code pneumonia   Select a diagnosis & list the code in the comments Pneumonia [318042]     Height: 5' 7" (1.702 m)     Weight: 64.8 kg (142 lb 13.7 oz)     Alternative treatment measures have been tried or considered and deemed clinically ineffective. Yes              X-Ray Chest 1 View   Standing Status: Future Standing Exp. Date: 01/01/25   Order Comments: Send results to Dr. Kj Irving      Order Specific Question Answer Comments   Reason for Exam: Pneumonia, post-hospitalization follow up     May the Radiologist modify the order per protocol to meet the clinical needs of the patient? Yes     Release to patient Immediate             Ambulatory referral/consult to Transitional Care    Standing Status: Future   Referral Priority: Routine Referral Type: Consultation    Referral Reason: Specialty Services Required    Number of Visits Requested: 1       Diet Adult Regular      No dressing needed      Activity as tolerated         Significant Diagnostic Studies:   Imaging Results    None               Pending Diagnostic Studies:         None             Medications:  Reconciled Home Medications:       Medication List          START taking these medications       amoxicillin-clavulanate 875-125mg 875-125 mg per tablet  Commonly known as: AUGMENTIN  Take 1 tablet by mouth every 12 (twelve) hours. for 15 days      aspirin 81 MG EC tablet  Commonly known as: ECOTRIN  Take 1 tablet (81 mg total) by mouth once daily.      benzonatate 100 MG capsule  Commonly known as: TESSALON  Take 1 capsule (100 mg total) by mouth 3 (three) times daily as needed for Cough.      doxycycline 100 MG tablet  Commonly known as: VIBRA-TABS  Take 1 tablet (100 mg total) by mouth every 12 (twelve) hours. for 15 days      Lactobacillus rhamnosus GG 10 billion cell capsule  Commonly known as: CULTURELLE  Take 1 capsule by mouth once daily.                CHANGE how you take these medications       COMBIPATCH 0.05-0.14 " mg/24 hr  Generic drug: estradiol-norethindrone  UNWRAP AND APPLY 1 PATCH ON THE SKIN TWICE A WEEK  What changed:   how much to take  how to take this  when to take this  additional instructions      HYDROcodone-acetaminophen  mg per tablet  Commonly known as: NORCO  Take 1 tablet by mouth 4 (four) times daily as needed for Pain.  What changed: Another medication with the same name was removed. Continue taking this medication, and follow the directions you see here.      levalbuterol 45 mcg/actuation inhaler  Commonly known as: XOPENEX HFA  INHALE 2 PUFFS INTO THE LUNGS EVERY 4 HOURS AS NEEDED FOR WHEEZING  What changed:   how much to take  reasons to take this      zolpidem 10 mg Tab  Commonly known as: AMBIEN  TAKE 1 TABLET BY MOUTH EVERY NIGHT AT BEDTIME  What changed:   how much to take  how to take this  when to take this  reasons to take this  additional instructions                CONTINUE taking these medications       ALOE VERA ORAL  Take 1 tablet by mouth daily as needed (constipation).      ALPRAZolam 0.25 MG tablet  Commonly known as: XANAX  Take 1 tablet (0.25 mg total) by mouth 2 (two) times daily as needed for Anxiety.      fluticasone-salmeterol 250-50 mcg/dose 250-50 mcg/dose diskus inhaler  Commonly known as: ADVAIR  Inhale 1 puff into the lungs 2 (two) times daily. Controller      levalbuterol 1.25 mg/3 mL nebulizer solution  Commonly known as: XOPENEX  1 ampule 3 (three) times daily.      LIDOcaine 4 % Ptmd  Apply 1 patch topically daily as needed (back/neck pain).      polyethylene glycol 17 gram/dose powder  Commonly known as: GLYCOLAX  Take 17 g by mouth daily as needed for Constipation.      [employer] AEROSOL DELIVERY SYSTEM Laura  Generic drug: nebulizer and compressor  by Misc.(Non-Drug; Combo Route) route 3 (three) times daily. Use with Nebulizer solution as directed      VITAMIN C 500 MG tablet  Generic drug: ascorbic acid (vitamin C)  Take 500 mg by mouth once daily.                STOP  "taking these medications       acyclovir 400 MG tablet  Commonly known as: ZOVIRAX      BC HEADACHE POWDER ORAL      ibuprofen 200 MG tablet  Commonly known as: ADVIL,MOTRIN      losartan-hydrochlorothiazide 50-12.5 mg 50-12.5 mg per tablet  Commonly known as: HYZAAR      meloxicam 15 MG tablet  Commonly known as: MOBIC      sulfamethoxazole-trimethoprim 800-160mg 800-160 mg Tab  Commonly known as: BACTRIM DS                   Indwelling Lines/Drains at time of discharge:   Lines/Drains/Airways         None                         Time spent on the discharge of patient: >30 minutes           Randell Gaming MD  Department of Hospital Medicine  [facility]      Electronically signed by Randell Gaming MD at 1/2/2024  4:13 PM         Current Assessment & Plan       Offered home health.  Patient declines.  Offered outpatient physical therapy.  Patient declines.Transitional Care Note    Family and/or Caretaker present at visit?  Yes.  Diagnostic tests reviewed/disposition: I have reviewed all completed as well as pending diagnostic tests at the time of discharge.  Disease/illness education: PNA  Home health/community services discussion/referrals: Patient does not have home health established from hospital visit.  They do not need home health.  If needed, we will set up home health for the patient.   Establishment or re-establishment of referral orders for community resources: No other necessary community resources.   Discussion with other health care providers: No discussion with other health care providers necessary.                Anemia    Overview     No results found for: "UIBC", "IRON", "TRANS", "TRANSFERRIN", "TIBC", "LABIRON", "FESATURATED"   No results found for: "BUZQFTYP01"  No results found for: "FOLATE"  Lab Results   Component Value Date    WBC 11.65 01/01/2024    HGB 7.6 (L) 01/01/2024    HCT 24.5 (L) 01/01/2024    MCV 98 01/01/2024     (H) 01/01/2024              Current Assessment & " Plan       Significant anemia.  Check levels.  Anemia precautions.   Consider hematology.             Review of patient's allergies indicates:   Allergen Reactions    Coconut Itching and Swelling    Coconut oil Photosensitivity    Lexapro [escitalopram]     Serotonin 5ht-3 antagonists Other (See Comments)     Current Outpatient Medications   Medication Instructions    ALOE VERA ORAL 1 tablet, Oral, Daily PRN    ALPRAZolam (XANAX) 0.25 mg, Oral, 2 times daily PRN    amoxicillin-clavulanate 875-125mg (AUGMENTIN) 875-125 mg per tablet 1 tablet, Oral, Every 12 hours    ascorbic acid (vitamin C) (VITAMIN C) 500 mg, Oral, Daily    aspirin (ECOTRIN) 81 mg, Oral, Daily    benzonatate (TESSALON) 100 mg, Oral, 3 times daily PRN    doxycycline (VIBRA-TABS) 100 mg, Oral, Every 12 hours    estradiol-norethindrone (COMBIPATCH) 0.05-0.14 mg/24 hr UNWRAP AND APPLY 1 PATCH ON THE SKIN TWICE A WEEK    fluticasone-salmeterol diskus inhaler 250-50 mcg 1 puff, Inhalation, 2 times daily, Controller    HYDROcodone-acetaminophen (NORCO)  mg per tablet 1 tablet, Oral, 4 times daily PRN    Lactobacillus rhamnosus GG (CULTURELLE) 10 billion cell capsule 1 capsule, Oral, Daily    levalbuterol (XOPENEX HFA) 45 mcg/actuation inhaler INHALE 2 PUFFS INTO THE LUNGS EVERY 4 HOURS AS NEEDED FOR WHEEZING    levalbuterol (XOPENEX) 1.25 mg/3 mL nebulizer solution 1 ampule, 3 times daily    LIDOcaine 4 % PtMd 1 patch, Topical (Top), Daily PRN    polyethylene glycol (GLYCOLAX) 17 g, Oral, Daily PRN    [employer] AEROSOL DELIVERY SYSTEM Laura Misc.(Non-Drug; Combo Route), 3 times daily, Use with Nebulizer solution as directed    zolpidem (AMBIEN) 10 mg Tab TAKE 1 TABLET BY MOUTH EVERY NIGHT AT BEDTIME      I have reviewed the PMH, social history, FamilyHx, surgical history, allergies and medications documented / confirmed by the patient at the time of this visit.  Review of Systems   Constitutional:  Negative for chills, fatigue, fever and unexpected weight  "change.   HENT:  Negative for ear pain and sore throat.    Eyes:  Negative for redness and visual disturbance.   Respiratory:  Negative for cough and shortness of breath.    Cardiovascular:  Negative for chest pain and palpitations.   Gastrointestinal:  Negative for nausea and vomiting.   Genitourinary:  Negative for difficulty urinating and hematuria.   Musculoskeletal:  Positive for arthralgias, back pain and neck pain. Negative for myalgias.   Skin:  Negative for rash and wound.   Neurological:  Negative for weakness and headaches.   Psychiatric/Behavioral:  Positive for sleep disturbance. The patient is not nervous/anxious.      Objective:   /64   Pulse 92   Ht 5' 7" (1.702 m)   Wt 61.4 kg (135 lb 6.4 oz)   LMP 01/05/2014   SpO2 100%   BMI 21.21 kg/m²   Physical Exam  Vitals and nursing note reviewed.   Constitutional:       General: She is not in acute distress.     Appearance: She is well-developed. She is not ill-appearing, toxic-appearing or diaphoretic.   HENT:      Head: Normocephalic and atraumatic.      Right Ear: Hearing and external ear normal.      Left Ear: Hearing and external ear normal.      Nose: Nose normal. No rhinorrhea.   Eyes:      General: Lids are normal.      Extraocular Movements: Extraocular movements intact.      Conjunctiva/sclera: Conjunctivae normal.      Pupils: Pupils are equal, round, and reactive to light.   Cardiovascular:      Rate and Rhythm: Normal rate.      Pulses: Normal pulses.   Pulmonary:      Effort: Pulmonary effort is normal. No respiratory distress.      Breath sounds: Normal breath sounds.   Abdominal:      General: Bowel sounds are normal.      Palpations: Abdomen is soft.   Musculoskeletal:         General: Normal range of motion.      Cervical back: Normal range of motion and neck supple.   Skin:     General: Skin is warm and dry.      Capillary Refill: Capillary refill takes less than 2 seconds.      Coloration: Skin is not pale.   Neurological:    "   General: No focal deficit present.      Mental Status: She is alert and oriented to person, place, and time. Mental status is at baseline. She is not disoriented.      Cranial Nerves: No cranial nerve deficit.      Motor: No weakness.      Gait: Gait normal.   Psychiatric:         Attention and Perception: She is attentive.         Mood and Affect: Mood normal. Mood is not anxious or depressed.         Speech: Speech is not rapid and pressured or slurred.         Behavior: Behavior normal. Behavior is not agitated, aggressive or hyperactive. Behavior is cooperative.         Thought Content: Thought content normal. Thought content is not paranoid or delusional. Thought content does not include homicidal or suicidal ideation. Thought content does not include homicidal or suicidal plan.         Cognition and Memory: Memory is not impaired.         Judgment: Judgment normal.         Assessment:     1. Hospital discharge follow-up    2. Encounter for long-term (current) use of medications    3. Pneumonia due to infectious organism, unspecified laterality, unspecified part of lung    4. Anemia, unspecified type      MDM:   High medical complexity. Mod to high risk.   Total time: 45 minutes.  This includes total time spent on the encounter, which includes face to face time and non-face to face time preparing to see the patient (eg, review of previous medical records, tests), Obtaining and/or reviewing separately obtained history, documenting clinical information in the electronic or other health record, independently interpreting results (not separately reported)/communicating results to the patient/family/caregiver, and/or care coordination (not separately reported).    I have Reviewed and summarized old records.  I have performed thorough medication reconciliation today and discussed risk and benefits of medications.  I have reviewed labs and discussed with patient.  All questions were answered.  I am requesting old  records and will review them once they are available.    I have signed for the following orders AND/OR meds.  Orders Placed This Encounter   Procedures    CBC Auto Differential     Standing Status:   Future     Standing Expiration Date:   3/4/2025    Iron and TIBC     Standing Status:   Future     Standing Expiration Date:   3/4/2025    Ferritin     Standing Status:   Future     Standing Expiration Date:   3/4/2025    Vitamin B12     Standing Status:   Future     Standing Expiration Date:   3/4/2025    Folate     Standing Status:   Future     Standing Expiration Date:   3/4/2025    Comprehensive Metabolic Panel     Standing Status:   Future     Standing Expiration Date:   3/4/2025           Follow up in about 3 months (around 4/4/2024), or if symptoms worsen or fail to improve, for Med refills.  Future Appointments       Date Provider Specialty Appt Notes    2/2/2024 Buzz Gee MD Family Medicine annual/ med refill          If no improvement in symptoms or symptoms worsen, advised to call/follow-up at clinic or go to ER. Patient voiced understanding and all questions/concerns were addressed.   DISCLAIMER: This note was compiled by using a speech recognition dictation system and therefore please be aware that typographical / speech recognition errors can and do occur.  Please contact me if you see any errors specifically.    Buzz Gee M.D.       Office: [phone] [id number] [place] [address]  FAX: [phone]

## 2024-01-09 ENCOUNTER — TELEPHONE (OUTPATIENT)
Dept: INFECTIOUS DISEASES | Facility: CLINIC | Age: 59
End: 2024-01-09
Payer: COMMERCIAL

## 2024-01-09 NOTE — TELEPHONE ENCOUNTER
Pt is on Doxy and Augmentin, per Pulmonology note today, pt was advised to hold abx due to abd pain and contact ID for  IV abx recommendations.  Dr. Escalante, please advise.    Pt awaiting recommendations.

## 2024-01-09 NOTE — TELEPHONE ENCOUNTER
----- Message from Shahana Harsha sent at 1/9/2024  8:54 AM CST -----  Contact: patient  Type:  Needs Medical Advice    Who Called: patient     Would the patient rather a call back or a response via [facility]? Call     Best Call Back Number: [phone] (home)      Additional Information: Patient would like to speak with the nurse in regards to discussing something from her recent hospital visit.    Please call to advise

## 2024-01-09 NOTE — TELEPHONE ENCOUNTER
Per Dr. Escalante, called pt, informed per Dr. Escalante ok to hold abx and monitor symptoms. Pt is seeing Dr. Irving tomorrow and will keep us updated.

## 2024-02-02 ENCOUNTER — HOSPITAL ENCOUNTER (OUTPATIENT)
Dept: RADIOLOGY | Facility: HOSPITAL | Age: 59
Discharge: HOME | End: 2024-02-02
Attending: INTERNAL MEDICINE
Payer: COMMERCIAL

## 2024-02-02 ENCOUNTER — PATIENT MESSAGE (OUTPATIENT)
Dept: FAMILY MEDICINE | Facility: CLINIC | Age: 59
End: 2024-02-02

## 2024-02-02 ENCOUNTER — TELEPHONE (OUTPATIENT)
Dept: FAMILY MEDICINE | Facility: CLINIC | Age: 59
End: 2024-02-02
Payer: COMMERCIAL

## 2024-02-02 ENCOUNTER — OFFICE VISIT (OUTPATIENT)
Dept: FAMILY MEDICINE | Facility: CLINIC | Age: 59
End: 2024-02-02
Payer: COMMERCIAL

## 2024-02-02 VITALS
SYSTOLIC BLOOD PRESSURE: 118 MMHG | DIASTOLIC BLOOD PRESSURE: 80 MMHG | BODY MASS INDEX: 20.43 KG/M2 | OXYGEN SATURATION: 95 % | HEIGHT: 67 IN | WEIGHT: 130.13 LBS | HEART RATE: 100 BPM

## 2024-02-02 DIAGNOSIS — Z13.220 ENCOUNTER FOR LIPID SCREENING FOR CARDIOVASCULAR DISEASE: ICD-10-CM

## 2024-02-02 DIAGNOSIS — Z00.00 WELL ADULT EXAM: Primary | ICD-10-CM

## 2024-02-02 DIAGNOSIS — M48.9 CERVICAL SPINE DISEASE: ICD-10-CM

## 2024-02-02 DIAGNOSIS — G89.29 CHRONIC BILATERAL LOW BACK PAIN WITHOUT SCIATICA: ICD-10-CM

## 2024-02-02 DIAGNOSIS — M54.50 CHRONIC BILATERAL LOW BACK PAIN WITHOUT SCIATICA: ICD-10-CM

## 2024-02-02 DIAGNOSIS — R93.89 ABNORMAL CHEST CT: ICD-10-CM

## 2024-02-02 DIAGNOSIS — D75.839 THROMBOCYTOSIS: ICD-10-CM

## 2024-02-02 DIAGNOSIS — E53.8 FOLATE DEFICIENCY: ICD-10-CM

## 2024-02-02 DIAGNOSIS — Z79.899 ENCOUNTER FOR LONG-TERM (CURRENT) USE OF MEDICATIONS: ICD-10-CM

## 2024-02-02 DIAGNOSIS — Z13.6 ENCOUNTER FOR LIPID SCREENING FOR CARDIOVASCULAR DISEASE: ICD-10-CM

## 2024-02-02 PROCEDURE — 99396 PREV VISIT EST AGE 40-64: CPT | Mod: S$GLB,,, | Performed by: FAMILY MEDICINE

## 2024-02-02 PROCEDURE — 71046 X-RAY EXAM CHEST 2 VIEWS: CPT | Mod: TC,PO

## 2024-02-02 PROCEDURE — 3074F SYST BP LT 130 MM HG: CPT | Mod: CPTII,S$GLB,, | Performed by: FAMILY MEDICINE

## 2024-02-02 PROCEDURE — 99999 PR PBB SHADOW E&M-EST. PATIENT-LVL IV: CPT | Mod: PBBFAC,,, | Performed by: FAMILY MEDICINE

## 2024-02-02 PROCEDURE — 71046 X-RAY EXAM CHEST 2 VIEWS: CPT | Mod: 26,,, | Performed by: RADIOLOGY

## 2024-02-02 PROCEDURE — 1160F RVW MEDS BY RX/DR IN RCRD: CPT | Mod: CPTII,S$GLB,, | Performed by: FAMILY MEDICINE

## 2024-02-02 PROCEDURE — 3008F BODY MASS INDEX DOCD: CPT | Mod: CPTII,S$GLB,, | Performed by: FAMILY MEDICINE

## 2024-02-02 PROCEDURE — 3079F DIAST BP 80-89 MM HG: CPT | Mod: CPTII,S$GLB,, | Performed by: FAMILY MEDICINE

## 2024-02-02 PROCEDURE — 1159F MED LIST DOCD IN RCRD: CPT | Mod: CPTII,S$GLB,, | Performed by: FAMILY MEDICINE

## 2024-02-02 RX ORDER — HYDROCODONE BITARTRATE AND ACETAMINOPHEN 10; 325 MG/1; MG/1
1 TABLET ORAL 4 TIMES DAILY PRN
Qty: 120 TABLET | Refills: 0 | Status: SHIPPED | OUTPATIENT
Start: 2024-02-02 | End: 2024-05-02 | Stop reason: SDUPTHER

## 2024-02-02 NOTE — PROGRESS NOTES
This note is specifically for wellness visit performed today.   WELLNESS EXAM      Patient ID: Wendie Reeves is a 58 y.o. female with  has a past medical history of Bronchiolectasis (01/2021), Bulging disc, Degenerative disc disease, cervical, Degenerative disc disease, lumbar, and Fibrocystic breast.   Chief Complaint:  Encounter for wellness exam    Well Adult Physical: Patient here for a comprehensive physical exam.The patient reports Chronic problems.   The patient's last visit with me was on 1/4/2024.    February 2024: Patient reports she is gradually improving from her extensive hospital stay with pneumonia.  She had updated chest x-ray today ordered by Cardiology.  She is following with Pulmonary.  She continues on oxygen as needed.  She is continuing on her inhalers.  Reviewed labs.  Patient is still has elevated platelet count.  Reports some easy bruising.  Here for medication refills.  Reports compliance.  No side effects reported.  Symptoms are controlled.    Reviewed Care team:Previous PCP: Dr. Tyler   Pulmonary: Kj Irving MD   OBGYN: Radha Brennan MD   Uro Dr. Delano aM , Aden Uribe  Neurosurgery [facility]   Pain [facility] [address]  Cardiology  Aduli - CT ca 10.6  Chiropractor     Do you take any herbs or supplements that were not prescribed by a doctor? no   Are you taking calcium supplements? no      Patient is transitioning care from Previous PCP: Dr. Tyler .  He was managing her chronic pain with opioid therapy and insomnia with Ambien.  Patient reports compliance.  No side effects reported.  Symptoms are controlled. The patient was checked in the [facility] Board of Pharmacy's  Prescription Monitoring Program. There appears to be no incongruities with the patient's verbalized history.     LMP: Patient's last menstrual period was 01/05/2014.   MMG:   breast cancer in paternal aunt.    Narrative & Impression  Result:  Mammo Digital Screening  Bilat w/ Zaid     History:  Patient is 56 y.o. and is seen for a screening mammogram.        Films Compared:  Compared to: 06/15/2021 Mammo Digital Screening Bilat w/ Zaid and 04/10/2019 Mammo Digital Screening Bilat w/ Zaid      Findings:   This procedure was performed using tomosynthesis.   Computer-aided detection was utilized in the interpretation of this examination.     The breasts are heterogeneously dense, which may obscure small masses. There is no evidence of suspicious masses, microcalcifications or architectural distortion.     Impression:   No mammographic evidence of malignancy.     BI-RADS Category 1: Negative     Recommendation:  Routine screening mammogram in 1 year is recommended.     Your estimated lifetime risk of breast cancer (to age 85) based on Tyrer-Cuzick risk assessment model is 12.62 %.  According to the American Cancer Society, patients with a lifetime breast cancer risk of 20% or higher might benefit from supplemental screening tests. ??                     Exam Ended: 08/31/22 09:57 CDT           PAP: No result found   HPV High Risk Genotypes, PCR  Order: [id number]  Status: Final result       Visible to patient: Yes (seen)       Next appt: 02/27/2024 at 02:45 PM in Pulmonology (Kj Irving MD)       Dx: Pap smear for cervical cancer screening    0 Result Notes       1 HM Topic       Component Ref Range & Units 4 yr ago   HPV High Risk type 16, PCR Negative Negative   HPV High Risk type 18, PCR Negative Negative   HPV other High Risk types, PCR Negative Negative   Comment: Other HPV genotypes include:  31,33,35,39,45,51,52,56,58,59,66 and 68.        Colon cancer screening:         Health Maintenance Topics with due status: Not Due       Topic Last Completion Date    Cervical Cancer Screening 05/28/2019    Pneumococcal Vaccines (Age 0-64) 11/18/2020    Colorectal Cancer Screening 11/23/2020    Lipid Panel 04/26/2023    TETANUS VACCINE 10/31/2023       ==============================================  History reviewed.   Health Maintenance Due   Topic Date Due    Shingles Vaccine (1 of 2) Never done    Mammogram  08/31/2023    COVID-19 Vaccine (3 - 2023-24 season) 09/01/2023       Past Medical History:  Past Medical History:   Diagnosis Date    Bronchiolectasis 01/2021    Bulging disc     Degenerative disc disease, cervical     Degenerative disc disease, lumbar     Fibrocystic breast      Past Surgical History:   Procedure Laterality Date    MANDIBLE FRACTURE SURGERY       Review of patient's allergies indicates:   Allergen Reactions    Coconut Itching and Swelling    Coconut oil Photosensitivity    Lexapro [escitalopram]     Serotonin 5ht-3 antagonists Other (See Comments)     Current Outpatient Medications on File Prior to Visit   Medication Sig Dispense Refill    ALOE VERA ORAL Take 1 tablet by mouth daily as needed (constipation).      ALPRAZolam (XANAX) 0.25 MG tablet Take 1 tablet (0.25 mg total) by mouth 2 (two) times daily as needed for Anxiety. 30 tablet 0    ascorbic acid, vitamin C, (VITAMIN C) 500 MG tablet Take 500 mg by mouth once daily.      aspirin (ECOTRIN) 81 MG EC tablet Take 1 tablet (81 mg total) by mouth once daily. 30 tablet 0    estradiol-norethindrone (COMBIPATCH) 0.05-0.14 mg/24 hr UNWRAP AND APPLY 1 PATCH ON THE SKIN TWICE A WEEK (Patient taking differently: Place 1 patch onto the skin once a week.) 8 patch 6    Lactobacillus rhamnosus GG (CULTURELLE) 10 billion cell capsule Take 1 capsule by mouth once daily. 30 capsule 0    levalbuterol (XOPENEX HFA) 45 mcg/actuation inhaler INHALE 2 PUFFS INTO THE LUNGS EVERY 4 HOURS AS NEEDED FOR WHEEZING (Patient taking differently: Inhale 2-4 puffs into the lungs every 4 (four) hours as needed for Wheezing.) 15 g 12    levalbuterol (XOPENEX) 1.25 mg/3 mL nebulizer solution Take 3 mLs (1.25 mg total) by nebulization 3 (three) times daily. 150 each 1    LIDOcaine 4 % PtMd Apply 1 patch topically  daily as needed (back/neck pain).      polyethylene glycol (GLYCOLAX) 17 gram/dose powder Take 17 g by mouth daily as needed for Constipation.      VIOS AEROSOL DELIVERY SYSTEM Laura by Misc.(Non-Drug; Combo Route) route 3 (three) times daily. Use with Nebulizer solution as directed      zolpidem (AMBIEN) 10 mg Tab TAKE 1 TABLET BY MOUTH EVERY NIGHT AT BEDTIME (Patient taking differently: Take 10 mg by mouth nightly as needed (sleep).) 30 tablet 5    [DISCONTINUED] HYDROcodone-acetaminophen (NORCO)  mg per tablet Take 1 tablet by mouth 4 (four) times daily as needed for Pain. 120 tablet 0    fluticasone-salmeterol diskus inhaler 250-50 mcg Inhale 1 puff into the lungs 2 (two) times daily. Controller 1 each 5    [DISCONTINUED] amoxicillin-clavulanate 875-125mg (AUGMENTIN) 875-125 mg per tablet Take 1 tablet by mouth every 12 (twelve) hours. for 15 days (Patient not taking: Reported on 1/10/2024) 30 tablet 0    [DISCONTINUED] benzonatate (TESSALON) 100 MG capsule Take 1 capsule (100 mg total) by mouth 3 (three) times daily as needed for Cough. 30 capsule 0    [DISCONTINUED] doxycycline (VIBRA-TABS) 100 MG tablet Take 1 tablet (100 mg total) by mouth every 12 (twelve) hours. for 15 days (Patient not taking: Reported on 1/10/2024) 30 tablet 0     No current facility-administered medications on file prior to visit.     Social History     Socioeconomic History    Marital status:    Occupational History     Employer: [employer]   Tobacco Use    Smoking status: Never     Passive exposure: Never    Smokeless tobacco: Never   Substance and Sexual Activity    Alcohol use: No    Drug use: No    Sexual activity: Yes     Birth control/protection: Post-menopausal     Social Determinants of Health     Financial Resource Strain: Low Risk  (12/20/2023)    Overall Financial Resource Strain (CARDIA)     Difficulty of Paying Living Expenses: Not hard at all   Food Insecurity: No Food Insecurity (12/20/2023)     Hunger Vital Sign     Worried About Running Out of Food in the Last Year: Never true     Ran Out of Food in the Last Year: Never true   Transportation Needs: No Transportation Needs (12/20/2023)    PRAPARE - Transportation     Lack of Transportation (Medical): No     Lack of Transportation (Non-Medical): No   Housing Stability: Unknown (12/20/2023)    Housing Stability Vital Sign     Unable to Pay for Housing in the Last Year: No     Unstable Housing in the Last Year: No     Family History   Problem Relation Age of Onset    Hypertension Mother     Diabetes Mother     Cancer Father     Breast cancer Paternal Aunt     Colon cancer Neg Hx     Ovarian cancer Neg Hx        Review of Systems   Constitutional: Negative.  Negative for chills, fatigue, fever and unexpected weight change.   HENT: Negative.  Negative for ear pain and sore throat.    Eyes: Negative.  Negative for redness and visual disturbance.   Respiratory:  Positive for shortness of breath and wheezing. Negative for cough.    Cardiovascular: Negative.  Negative for chest pain and palpitations.   Gastrointestinal:  Positive for abdominal pain. Negative for nausea and vomiting.   Endocrine: Negative.    Genitourinary: Negative.  Negative for difficulty urinating and hematuria.   Musculoskeletal:  Positive for arthralgias, back pain and neck pain. Negative for myalgias.   Skin: Negative.  Negative for rash and wound.   Allergic/Immunologic: Negative.    Neurological: Negative.  Negative for weakness and headaches.   Hematological: Negative.    Psychiatric/Behavioral:  Positive for sleep disturbance. The patient is not nervous/anxious.    All other systems reviewed and are negative.     Objective:    Nursing note and vitals reviewed.  Vitals:    02/02/24 1341   BP: 118/80   Pulse: 100     Body mass index is 20.38 kg/m².   Physical Exam  Vitals and nursing note reviewed.   Constitutional:       General: She is not in acute distress.     Appearance: She is  well-developed.   HENT:      Head: Normocephalic and atraumatic.      Right Ear: External ear normal.      Left Ear: External ear normal.      Nose: Nose normal. No rhinorrhea.   Eyes:      Extraocular Movements: Extraocular movements intact.      Pupils: Pupils are equal, round, and reactive to light.   Cardiovascular:      Rate and Rhythm: Normal rate.      Pulses: Normal pulses.   Pulmonary:      Effort: Pulmonary effort is normal. No respiratory distress.      Breath sounds: Wheezing present.      Comments: Decreased breath sounds right lobe  Abdominal:      General: Bowel sounds are normal. There is no distension.      Palpations: Abdomen is soft.   Musculoskeletal:         General: Tenderness and deformity present. Normal range of motion.      Cervical back: Normal range of motion and neck supple.   Skin:     General: Skin is warm and dry.      Capillary Refill: Capillary refill takes less than 2 seconds.   Neurological:      General: No focal deficit present.      Mental Status: She is alert and oriented to person, place, and time.   Psychiatric:         Mood and Affect: Mood normal.         Behavior: Behavior normal.       Lab Visit on 01/30/2024   Component Date Value Ref Range Status    WBC 01/30/2024 10.30  3.90 - 12.70 K/uL Final    RBC 01/30/2024 3.83 (L)  4.00 - 5.40 M/uL Final    Hemoglobin 01/30/2024 11.5 (L)  12.0 - 16.0 g/dL Final    Hematocrit 01/30/2024 37.1  37.0 - 48.5 % Final    MCV 01/30/2024 97  82 - 98 fL Final    MCH 01/30/2024 30.0  27.0 - 31.0 pg Final    MCHC 01/30/2024 31.0 (L)  32.0 - 36.0 g/dL Final    RDW 01/30/2024 13.7  11.5 - 14.5 % Final    Platelets 01/30/2024 738 (H)  150 - 450 K/uL Final    MPV 01/30/2024 8.8 (L)  9.2 - 12.9 fL Final    Immature Granulocytes 01/30/2024 0.8 (H)  0.0 - 0.5 % Final    Gran # (ANC) 01/30/2024 5.8  1.8 - 7.7 K/uL Final    Immature Grans (Abs) 01/30/2024 0.08 (H)  0.00 - 0.04 K/uL Final    Comment: Mild elevation in immature granulocytes is non  specific and   can be seen in a variety of conditions including stress response,   acute inflammation, trauma and pregnancy. Correlation with other   laboratory and clinical findings is essential.      Lymph # 01/30/2024 3.4  1.0 - 4.8 K/uL Final    Mono # 01/30/2024 0.9  0.3 - 1.0 K/uL Final    Eos # 01/30/2024 0.1  0.0 - 0.5 K/uL Final    Baso # 01/30/2024 0.07  0.00 - 0.20 K/uL Final    nRBC 01/30/2024 0  0 /100 WBC Final    Gran % 01/30/2024 56.3  38.0 - 73.0 % Final    Lymph % 01/30/2024 32.5  18.0 - 48.0 % Final    Mono % 01/30/2024 8.3  4.0 - 15.0 % Final    Eosinophil % 01/30/2024 1.4  0.0 - 8.0 % Final    Basophil % 01/30/2024 0.7  0.0 - 1.9 % Final    Differential Method 01/30/2024 Automated   Final    Iron 01/30/2024 72  30 - 160 ug/dL Final    TIBC 01/30/2024 282  265 - 497 ug/dL Final    Iron Saturation 01/30/2024 26  20 - 50 % Final    Ferritin 01/30/2024 275 (H)  12 - 264 ng/mL Final    Comment: WARNING: Heterophilic antibodies in the serum or plasma of   certain individuals are known to cause interference with   immunoassays. These antibodies may be present in blood samples   from individuals regularly exposed to animals or who have been   treated with animal serum products.     Note: Patients taking very high Biotin doses of >300 mcg/day may   cause a negative bias in this assay.      Vitamin B-12 01/30/2024 707  210 - 950 pg/mL Final    Comment: Patients taking very high Biotin doses of >300 mcg/day may   cause a positive bias in this assay.      Folate 01/30/2024 4.6  4.0 - 24.0 ng/mL Final    Comment: Patients taking very high Biotin doses of >300 mcg/day may   cause a positive bias in this assay.      Sodium 01/30/2024 140  136 - 145 mmol/L Final    Potassium 01/30/2024 4.9  3.5 - 5.1 mmol/L Final    Anion Gap reference range revised on 4/28/2023    Chloride 01/30/2024 102  95 - 110 mmol/L Final    CO2 01/30/2024 27  22 - 31 mmol/L Final    Glucose 01/30/2024 87  70 - 110 mg/dL Final    Comment:  The ADA recommends the following guidelines for fasting glucose:    Normal:       less than 100 mg/dL    Prediabetes:  100 mg/dL to 125 mg/dL    Diabetes:     126 mg/dL or higher      BUN 01/30/2024 19 (H)  7 - 18 mg/dL Final    Creatinine 01/30/2024 0.62  0.50 - 1.40 mg/dL Final    Calcium 01/30/2024 10.3 (H)  8.4 - 10.2 mg/dL Final    Total Protein 01/30/2024 8.1  6.0 - 8.4 g/dL Final    Albumin 01/30/2024 4.5  3.5 - 5.2 g/dL Final    Total Bilirubin 01/30/2024 0.6  0.2 - 1.3 mg/dL Final    Alkaline Phosphatase 01/30/2024 109  38 - 145 U/L Final    AST 01/30/2024 30  14 - 36 U/L Final    ALT 01/30/2024 22  0 - 35 U/L Final    Anion Gap 01/30/2024 11  5 - 12 mmol/L Final    Anion Gap reference range revised on 4/28/2023    eGFR 01/30/2024 >60  >60 mL/min/1.73 m^2 Final      Assessment / Plan:      1.  ANNUAL WELLNESS EXAM -patient here for annual wellness exam.  Labs ordered.  Health maintenance was reviewed and ordered.  Medications were reviewed and reconciled.   Anticipatory guidance: Don't smoke.  Healthy diet and regular exercise recommended. Vaccine recommendations discussed.  See orders.  Reviewed Anticipatory guidance, risk factor reduction interventions and counseling, Complete history , physical was completed today.  Complete and thorough medication reconciliation was performed.  Discussed risks and benefits of medications.  Advised patient on orders and health maintenance.  We discussed old records and old labs if available.  Will request any records not available through epic.  Continue current medications listed on your summary sheet.  Follow-up with Pulmonary concerning chronic shortness of breath history of pneumonia.  Follow-up with Cardiology.  Requesting records from all specialists.  Thrombocytosis:  Monitor platelet count.  Recheck in four weeks.  If no improvement I recommend Hematology consult.  Anemia is improving.  Blood clot, Bruising and bleeding precautions.  Folate deficiency:  Start B  complex vitamin.  Chronic pain:  Refill medication.  Discussed risk and benefits of medication use.  The patient was checked in the [facility] Board of Pharmacy's  Prescription Monitoring Program. There appears to be no incongruities with the patient's verbalized history.     All questions were answered. Patient had no further concerns. Advised of Wellness plan. Follow up in 1 year for ANNUAL WELLNESS EXAM    Orders Placed This Encounter   Procedures    CBC Without Differential     Standing Status:   Future     Standing Expiration Date:   4/2/2025    Comprehensive Metabolic Panel     Standing Status:   Future     Standing Expiration Date:   4/2/2025    TSH     Standing Status:   Future     Standing Expiration Date:   4/2/2025    Hemoglobin A1C     Standing Status:   Future     Standing Expiration Date:   4/2/2025    Lipid Panel     Standing Status:   Future     Standing Expiration Date:   4/2/2025    Platelet Count, Blue [place]     Standing Status:   Future     Standing Expiration Date:   4/2/2025    Folate     Standing Status:   Future     Standing Expiration Date:   4/2/2025     Medications Ordered This Encounter   Medications    HYDROcodone-acetaminophen (NORCO)  mg per tablet     Sig: Take 1 tablet by mouth 4 (four) times daily as needed for Pain.     Dispense:  120 tablet     Refill:  0     Quantity prescribed more than 7 day supply? Yes, quantity medically necessary      Future Appointments       Date Provider Specialty Appt Notes    2/2/2024 Buzz Gee MD Family Medicine annual/ med refill    8/2/2024  Lab             Buzz Gee MD

## 2024-03-13 PROBLEM — J96.11 CHRONIC RESPIRATORY FAILURE WITH HYPOXIA: Status: ACTIVE | Noted: 2024-03-13

## 2024-03-25 PROBLEM — N17.9 AKI (ACUTE KIDNEY INJURY): Status: RESOLVED | Noted: 2023-12-19 | Resolved: 2024-03-25

## 2024-04-08 PROBLEM — Z09 HOSPITAL DISCHARGE FOLLOW-UP: Status: RESOLVED | Noted: 2024-01-04 | Resolved: 2024-04-08

## 2024-04-08 PROBLEM — J18.9 PNEUMONIA DUE TO INFECTIOUS ORGANISM: Status: RESOLVED | Noted: 2023-12-19 | Resolved: 2024-04-08

## 2024-04-25 ENCOUNTER — PATIENT MESSAGE (OUTPATIENT)
Dept: FAMILY MEDICINE | Facility: CLINIC | Age: 59
End: 2024-04-25
Payer: COMMERCIAL

## 2024-04-25 VITALS — SYSTOLIC BLOOD PRESSURE: 121 MMHG | DIASTOLIC BLOOD PRESSURE: 80 MMHG

## 2024-05-02 ENCOUNTER — OFFICE VISIT (OUTPATIENT)
Dept: FAMILY MEDICINE | Facility: CLINIC | Age: 59
End: 2024-05-02
Payer: COMMERCIAL

## 2024-05-02 ENCOUNTER — HOSPITAL ENCOUNTER (OUTPATIENT)
Dept: RADIOLOGY | Facility: HOSPITAL | Age: 59
Discharge: HOME | End: 2024-05-02
Attending: NURSE PRACTITIONER
Payer: COMMERCIAL

## 2024-05-02 VITALS
HEIGHT: 60 IN | BODY MASS INDEX: 24.96 KG/M2 | DIASTOLIC BLOOD PRESSURE: 78 MMHG | WEIGHT: 127.13 LBS | SYSTOLIC BLOOD PRESSURE: 115 MMHG | HEART RATE: 84 BPM

## 2024-05-02 DIAGNOSIS — Z79.899 ENCOUNTER FOR LONG-TERM (CURRENT) USE OF MEDICATIONS: ICD-10-CM

## 2024-05-02 DIAGNOSIS — G89.29 CHRONIC BILATERAL LOW BACK PAIN WITHOUT SCIATICA: ICD-10-CM

## 2024-05-02 DIAGNOSIS — M54.50 CHRONIC BILATERAL LOW BACK PAIN WITHOUT SCIATICA: ICD-10-CM

## 2024-05-02 DIAGNOSIS — S69.91XA FINGER INJURY, RIGHT, INITIAL ENCOUNTER: ICD-10-CM

## 2024-05-02 DIAGNOSIS — M48.9 CERVICAL SPINE DISEASE: ICD-10-CM

## 2024-05-02 DIAGNOSIS — F41.9 ANXIETY: Primary | Chronic | ICD-10-CM

## 2024-05-02 DIAGNOSIS — G47.00 INSOMNIA, UNSPECIFIED TYPE: ICD-10-CM

## 2024-05-02 PROBLEM — Z75.8 DISCHARGE PLANNING ISSUES: Status: RESOLVED | Noted: 2023-12-28 | Resolved: 2024-05-02

## 2024-05-02 PROCEDURE — 73130 X-RAY EXAM OF HAND: CPT | Mod: TC,PO,RT

## 2024-05-02 PROCEDURE — 1159F MED LIST DOCD IN RCRD: CPT | Mod: CPTII,S$GLB,, | Performed by: NURSE PRACTITIONER

## 2024-05-02 PROCEDURE — 3078F DIAST BP <80 MM HG: CPT | Mod: CPTII,S$GLB,, | Performed by: NURSE PRACTITIONER

## 2024-05-02 PROCEDURE — 3008F BODY MASS INDEX DOCD: CPT | Mod: CPTII,S$GLB,, | Performed by: NURSE PRACTITIONER

## 2024-05-02 PROCEDURE — 73130 X-RAY EXAM OF HAND: CPT | Mod: 26,RT,, | Performed by: RADIOLOGY

## 2024-05-02 PROCEDURE — 99214 OFFICE O/P EST MOD 30 MIN: CPT | Mod: S$GLB,,, | Performed by: NURSE PRACTITIONER

## 2024-05-02 PROCEDURE — 1160F RVW MEDS BY RX/DR IN RCRD: CPT | Mod: CPTII,S$GLB,, | Performed by: NURSE PRACTITIONER

## 2024-05-02 PROCEDURE — 3074F SYST BP LT 130 MM HG: CPT | Mod: CPTII,S$GLB,, | Performed by: NURSE PRACTITIONER

## 2024-05-02 PROCEDURE — 99999 PR PBB SHADOW E&M-EST. PATIENT-LVL V: CPT | Mod: PBBFAC,,, | Performed by: NURSE PRACTITIONER

## 2024-05-02 RX ORDER — ALPRAZOLAM 0.25 MG/1
0.25 TABLET ORAL DAILY PRN
Qty: 30 TABLET | Refills: 0 | Status: SHIPPED | OUTPATIENT
Start: 2024-05-02

## 2024-05-02 RX ORDER — ZOLPIDEM TARTRATE 10 MG/1
10 TABLET ORAL NIGHTLY PRN
Qty: 30 TABLET | Refills: 5 | Status: SHIPPED | OUTPATIENT
Start: 2024-05-02

## 2024-05-02 RX ORDER — ZOLPIDEM TARTRATE 10 MG/1
10 TABLET ORAL NIGHTLY PRN
Qty: 30 TABLET | Refills: 5 | Status: CANCELLED | OUTPATIENT
Start: 2024-05-02

## 2024-05-02 RX ORDER — HYDROCODONE BITARTRATE AND ACETAMINOPHEN 10; 325 MG/1; MG/1
1 TABLET ORAL 4 TIMES DAILY PRN
Qty: 120 TABLET | Refills: 0 | Status: SHIPPED | OUTPATIENT
Start: 2024-05-02

## 2024-05-02 NOTE — PATIENT INSTRUCTIONS
Gilberto Pressley,     If you are due for any health screening(s) below please notify me so we can arrange them to be ordered and scheduled. Most healthy patients at your age complete them, but you are free to accept or refuse.     If you can't do it, I'll definitely understand. If you can, I'd certainly appreciate it!    Tests to Keep You Healthy    Mammogram: ORDERED BUT NOT SCHEDULED  Colon Cancer Screening: Met on 11/23/2020  Cervical Cancer Screening: Met on 5/28/2019  Last Blood Pressure <= 139/89 (5/2/2024): Yes      Schedule your breast cancer screening today     Breast cancer is the second most common cancer in women,  and the second leading cause of death from cancer. Mammograms can detect breast cancer early, which significantly increases the chances of curing the cancer.       Our records indicate that you may be overdue for breast cancer screening. Cancer screenings save lives, so schedule yours today to stay healthy.     If you recently had a mammogram performed outside of [facility], please let your Health care team know so that they can update your health record.

## 2024-05-02 NOTE — ASSESSMENT & PLAN NOTE
Continue Xanax. Discussed medication risks. The patient was checked in the [facility] Board of Pharmacy's  Prescription Monitoring Program. There appears to be no incongruities with the patient's verbalized history.     Please be advised of condition course.  SNRI/SSRI is first-line treatment for this condition.  Please be advised of the risk of discontinuing this medication without tapering/contacting MD.  Patient has been advised of side effects, and all questions were answered.  Patient voiced understanding.  Patient will follow up routinely and notify us if having any side effects or worsening or persistent symptoms.  ER precautions were given. Antidepressant/Antianxiety Medication Initiation:  Patient informed of risks, benefits, and potential side effects of medication and accepts informed consent.  Common side effects include nausea, fatigue, headache, insomnia, etc see medication insert for complete side effect profile.  Most importantly be advised of the possibility of new or worsening suicidal thoughts/depression/anxiety etcetera.  Please be advised to stop the medication immediately and seek urgent treatment if this occurs.  Therefore please do not to abruptly discontinue medication without physician guidance except in cases of sudden onset or worsening of SI.

## 2024-05-02 NOTE — ASSESSMENT & PLAN NOTE
Continue pain medication.  Continue follow-up with neuro surgery and pain management. Continue pain medication as prescribed per PCP. RTC in 3 months for re-evaluation. Plan was disucssed with Dr. Gee who was immediately available in clinic. The patient was checked in the [facility] [employer]'s  Prescription Monitoring Program. There appears to be no incongruities with the patient's verbalized history.  If no improvement with pain medications patient will be referred to pain management for further evaluation.

## 2024-05-02 NOTE — ASSESSMENT & PLAN NOTE
Continue pain medication as prescribed per PCP. RTC in 3 months for re-evaluation. Plan was disucssed with Dr. Gee who was immediately available in clinic. The patient was checked in the [facility] [employer]'s  Prescription Monitoring Program. There appears to be no incongruities with the patient's verbalized history.  If no improvement with pain medications patient will be referred to pain management for further evaluation.

## 2024-05-04 DIAGNOSIS — M19.041 ARTHRITIS OF RIGHT HAND: Primary | ICD-10-CM

## 2024-05-04 NOTE — PROGRESS NOTES
1st check to see if patient has seen the results.  If not then  CALL patient with results and Document verification.  Schedule follow-up if needed.  [phone]  X-ray of the right hand reviewed by radiology.  There is arthritis present.  If no improvement in symptoms I recommend that you establish care with orthopedic hand specialist.  Referral has been placed.  You can try oral anti-inflammatory medication for this condition over-the-counter or topical anti-inflammatory such as Voltaren gel.

## 2024-05-06 ENCOUNTER — PATIENT MESSAGE (OUTPATIENT)
Dept: FAMILY MEDICINE | Facility: CLINIC | Age: 59
End: 2024-05-06
Payer: COMMERCIAL

## 2024-05-13 ENCOUNTER — PATIENT OUTREACH (OUTPATIENT)
Dept: ADMINISTRATIVE | Facility: HOSPITAL | Age: 59
End: 2024-05-13
Payer: COMMERCIAL

## 2024-05-18 ENCOUNTER — PATIENT MESSAGE (OUTPATIENT)
Dept: FAMILY MEDICINE | Facility: CLINIC | Age: 59
End: 2024-05-18
Payer: COMMERCIAL

## 2024-05-28 ENCOUNTER — PATIENT OUTREACH (OUTPATIENT)
Dept: ADMINISTRATIVE | Facility: HOSPITAL | Age: 59
End: 2024-05-28
Payer: COMMERCIAL

## 2024-05-31 ENCOUNTER — TELEPHONE (OUTPATIENT)
Dept: FAMILY MEDICINE | Facility: CLINIC | Age: 59
End: 2024-05-31
Payer: COMMERCIAL

## 2024-05-31 NOTE — TELEPHONE ENCOUNTER
----- Message from Liliana Bliss sent at 5/31/2024  9:54 AM CDT -----  Regarding: appt concerns  Name of who is calling:   Wendie      What is the request in detail: Pt is requesting a call back in ref to scheduling an appt on 06/04/2024 after 230 pm due to high blood pressure and dizziness       Can the clinic reply by [facility]:no      What number to call back if not [facility]: [phone]

## 2024-06-17 PROBLEM — J96.11 CHRONIC RESPIRATORY FAILURE WITH HYPOXIA: Status: RESOLVED | Noted: 2024-03-13 | Resolved: 2024-06-17

## 2024-07-09 PROBLEM — R53.81 PHYSICAL DECONDITIONING: Status: ACTIVE | Noted: 2024-07-09

## 2024-07-19 ENCOUNTER — TELEPHONE (OUTPATIENT)
Dept: FAMILY MEDICINE | Facility: CLINIC | Age: 59
End: 2024-07-19
Payer: COMMERCIAL

## 2024-07-19 NOTE — TELEPHONE ENCOUNTER
----- Message from Michelle Landa sent at 7/19/2024  2:21 PM CDT -----  Type:  Patient Returning Call    Who Called:pt  Who Left Message for Patient:Page  Does the patient know what this is regarding?:her appt  Would the patient rather a call back or a response via [facility]? reji  Best Call Back Number:[phone]  Additional Information: .    Thank you

## 2024-07-19 NOTE — TELEPHONE ENCOUNTER
Call returned. Pt annual exam has been rescheduled to 08/05/2024 w/[facility]. Pt notified and verbalized understanding.

## 2024-08-05 ENCOUNTER — OFFICE VISIT (OUTPATIENT)
Dept: FAMILY MEDICINE | Facility: CLINIC | Age: 59
End: 2024-08-05
Payer: COMMERCIAL

## 2024-08-05 ENCOUNTER — LAB VISIT (OUTPATIENT)
Dept: LAB | Facility: HOSPITAL | Age: 59
End: 2024-08-05
Attending: FAMILY MEDICINE
Payer: COMMERCIAL

## 2024-08-05 ENCOUNTER — PATIENT MESSAGE (OUTPATIENT)
Dept: FAMILY MEDICINE | Facility: CLINIC | Age: 59
End: 2024-08-05

## 2024-08-05 VITALS
HEART RATE: 69 BPM | SYSTOLIC BLOOD PRESSURE: 132 MMHG | OXYGEN SATURATION: 97 % | BODY MASS INDEX: 25.26 KG/M2 | DIASTOLIC BLOOD PRESSURE: 84 MMHG | HEIGHT: 60 IN | WEIGHT: 128.69 LBS

## 2024-08-05 DIAGNOSIS — G89.29 CHRONIC BILATERAL LOW BACK PAIN WITHOUT SCIATICA: Primary | ICD-10-CM

## 2024-08-05 DIAGNOSIS — Z13.220 ENCOUNTER FOR LIPID SCREENING FOR CARDIOVASCULAR DISEASE: ICD-10-CM

## 2024-08-05 DIAGNOSIS — J47.9 BRONCHIECTASIS WITHOUT COMPLICATION: ICD-10-CM

## 2024-08-05 DIAGNOSIS — F41.9 ANXIETY: Chronic | ICD-10-CM

## 2024-08-05 DIAGNOSIS — Z00.00 WELL ADULT EXAM: ICD-10-CM

## 2024-08-05 DIAGNOSIS — Z79.899 ENCOUNTER FOR LONG-TERM (CURRENT) USE OF MEDICATIONS: ICD-10-CM

## 2024-08-05 DIAGNOSIS — Z13.6 ENCOUNTER FOR LIPID SCREENING FOR CARDIOVASCULAR DISEASE: ICD-10-CM

## 2024-08-05 DIAGNOSIS — G89.29 CHRONIC BILATERAL LOW BACK PAIN WITHOUT SCIATICA: ICD-10-CM

## 2024-08-05 DIAGNOSIS — M54.50 CHRONIC BILATERAL LOW BACK PAIN WITHOUT SCIATICA: Primary | ICD-10-CM

## 2024-08-05 DIAGNOSIS — Z79.899 ENCOUNTER FOR LONG-TERM (CURRENT) USE OF MEDICATIONS: Chronic | ICD-10-CM

## 2024-08-05 DIAGNOSIS — M48.9 CERVICAL SPINE DISEASE: ICD-10-CM

## 2024-08-05 DIAGNOSIS — M54.50 CHRONIC BILATERAL LOW BACK PAIN WITHOUT SCIATICA: ICD-10-CM

## 2024-08-05 LAB
ALBUMIN SERPL BCP-MCNC: 4.1 G/DL (ref 3.5–5.2)
ALP SERPL-CCNC: 70 U/L (ref 55–135)
ALT SERPL W/O P-5'-P-CCNC: 12 U/L (ref 10–44)
ANION GAP SERPL CALC-SCNC: 10 MMOL/L (ref 8–16)
AST SERPL-CCNC: 20 U/L (ref 10–40)
BILIRUB SERPL-MCNC: 0.5 MG/DL (ref 0.1–1)
BUN SERPL-MCNC: 22 MG/DL (ref 6–20)
CALCIUM SERPL-MCNC: 9.6 MG/DL (ref 8.7–10.5)
CHLORIDE SERPL-SCNC: 106 MMOL/L (ref 95–110)
CHOLEST SERPL-MCNC: 193 MG/DL (ref 120–199)
CHOLEST/HDLC SERPL: 3.1 {RATIO} (ref 2–5)
CO2 SERPL-SCNC: 24 MMOL/L (ref 23–29)
CREAT SERPL-MCNC: 0.8 MG/DL (ref 0.5–1.4)
ERYTHROCYTE [DISTWIDTH] IN BLOOD BY AUTOMATED COUNT: 12.3 % (ref 11.5–14.5)
EST. GFR  (NO RACE VARIABLE): >60 ML/MIN/1.73 M^2
ESTIMATED AVG GLUCOSE: 100 MG/DL (ref 68–131)
GLUCOSE SERPL-MCNC: 81 MG/DL (ref 70–110)
HBA1C MFR BLD: 5.1 % (ref 4–5.6)
HCT VFR BLD AUTO: 38.4 % (ref 37–48.5)
HDLC SERPL-MCNC: 63 MG/DL (ref 40–75)
HDLC SERPL: 32.6 % (ref 20–50)
HGB BLD-MCNC: 12.4 G/DL (ref 12–16)
LDLC SERPL CALC-MCNC: 118.6 MG/DL (ref 63–159)
MCH RBC QN AUTO: 31.3 PG (ref 27–31)
MCHC RBC AUTO-ENTMCNC: 32.3 G/DL (ref 32–36)
MCV RBC AUTO: 97 FL (ref 82–98)
NONHDLC SERPL-MCNC: 130 MG/DL
PLATELET # BLD AUTO: 370 K/UL (ref 150–450)
PMV BLD AUTO: 9.6 FL (ref 9.2–12.9)
POTASSIUM SERPL-SCNC: 4 MMOL/L (ref 3.5–5.1)
PROT SERPL-MCNC: 7.6 G/DL (ref 6–8.4)
RBC # BLD AUTO: 3.96 M/UL (ref 4–5.4)
SODIUM SERPL-SCNC: 140 MMOL/L (ref 136–145)
TRIGL SERPL-MCNC: 57 MG/DL (ref 30–150)
TSH SERPL DL<=0.005 MIU/L-ACNC: 1.27 UIU/ML (ref 0.4–4)
WBC # BLD AUTO: 7.32 K/UL (ref 3.9–12.7)

## 2024-08-05 PROCEDURE — 1160F RVW MEDS BY RX/DR IN RCRD: CPT | Mod: CPTII,S$GLB,, | Performed by: NURSE PRACTITIONER

## 2024-08-05 PROCEDURE — 99999 PR PBB SHADOW E&M-EST. PATIENT-LVL V: CPT | Mod: PBBFAC,,, | Performed by: NURSE PRACTITIONER

## 2024-08-05 PROCEDURE — 85027 COMPLETE CBC AUTOMATED: CPT | Performed by: FAMILY MEDICINE

## 2024-08-05 PROCEDURE — 3079F DIAST BP 80-89 MM HG: CPT | Mod: CPTII,S$GLB,, | Performed by: NURSE PRACTITIONER

## 2024-08-05 PROCEDURE — 3075F SYST BP GE 130 - 139MM HG: CPT | Mod: CPTII,S$GLB,, | Performed by: NURSE PRACTITIONER

## 2024-08-05 PROCEDURE — 3008F BODY MASS INDEX DOCD: CPT | Mod: CPTII,S$GLB,, | Performed by: NURSE PRACTITIONER

## 2024-08-05 PROCEDURE — 84443 ASSAY THYROID STIM HORMONE: CPT | Performed by: FAMILY MEDICINE

## 2024-08-05 PROCEDURE — 1159F MED LIST DOCD IN RCRD: CPT | Mod: CPTII,S$GLB,, | Performed by: NURSE PRACTITIONER

## 2024-08-05 PROCEDURE — 80061 LIPID PANEL: CPT | Performed by: FAMILY MEDICINE

## 2024-08-05 PROCEDURE — 36415 COLL VENOUS BLD VENIPUNCTURE: CPT | Mod: PO | Performed by: FAMILY MEDICINE

## 2024-08-05 PROCEDURE — 80053 COMPREHEN METABOLIC PANEL: CPT | Performed by: FAMILY MEDICINE

## 2024-08-05 PROCEDURE — 99214 OFFICE O/P EST MOD 30 MIN: CPT | Mod: S$GLB,,, | Performed by: NURSE PRACTITIONER

## 2024-08-05 PROCEDURE — 83036 HEMOGLOBIN GLYCOSYLATED A1C: CPT | Performed by: FAMILY MEDICINE

## 2024-08-05 RX ORDER — ALPRAZOLAM 0.25 MG/1
0.25 TABLET ORAL DAILY PRN
Qty: 30 TABLET | Refills: 5 | Status: SHIPPED | OUTPATIENT
Start: 2024-08-05

## 2024-08-05 RX ORDER — LOSARTAN POTASSIUM AND HYDROCHLOROTHIAZIDE 12.5; 5 MG/1; MG/1
1 TABLET ORAL
COMMUNITY
Start: 2024-07-10

## 2024-08-05 RX ORDER — HYDROCODONE BITARTRATE AND ACETAMINOPHEN 10; 325 MG/1; MG/1
1 TABLET ORAL 4 TIMES DAILY PRN
Qty: 120 TABLET | Refills: 0 | Status: SHIPPED | OUTPATIENT
Start: 2024-08-05

## 2024-09-03 ENCOUNTER — HOSPITAL ENCOUNTER (OUTPATIENT)
Dept: RADIOLOGY | Facility: HOSPITAL | Age: 59
Discharge: HOME | End: 2024-09-03
Attending: OBSTETRICS & GYNECOLOGY
Payer: COMMERCIAL

## 2024-09-03 DIAGNOSIS — Z12.31 ENCOUNTER FOR SCREENING MAMMOGRAM FOR BREAST CANCER: ICD-10-CM

## 2024-09-03 PROCEDURE — 77067 SCR MAMMO BI INCL CAD: CPT | Mod: 26,,, | Performed by: RADIOLOGY

## 2024-09-03 PROCEDURE — 77067 SCR MAMMO BI INCL CAD: CPT | Mod: TC,PO

## 2024-09-03 PROCEDURE — 77063 BREAST TOMOSYNTHESIS BI: CPT | Mod: 26,,, | Performed by: RADIOLOGY

## 2024-09-11 ENCOUNTER — PATIENT MESSAGE (OUTPATIENT)
Dept: FAMILY MEDICINE | Facility: CLINIC | Age: 59
End: 2024-09-11
Payer: COMMERCIAL

## 2024-11-05 ENCOUNTER — OFFICE VISIT (OUTPATIENT)
Dept: FAMILY MEDICINE | Facility: CLINIC | Age: 59
End: 2024-11-05
Payer: COMMERCIAL

## 2024-11-05 VITALS
BODY MASS INDEX: 27.07 KG/M2 | WEIGHT: 137.88 LBS | HEART RATE: 78 BPM | SYSTOLIC BLOOD PRESSURE: 130 MMHG | HEIGHT: 60 IN | DIASTOLIC BLOOD PRESSURE: 80 MMHG | OXYGEN SATURATION: 100 %

## 2024-11-05 DIAGNOSIS — G89.29 CHRONIC BILATERAL LOW BACK PAIN WITHOUT SCIATICA: Primary | ICD-10-CM

## 2024-11-05 DIAGNOSIS — G47.00 INSOMNIA, UNSPECIFIED TYPE: ICD-10-CM

## 2024-11-05 DIAGNOSIS — I10 HYPERTENSION, ESSENTIAL: Chronic | ICD-10-CM

## 2024-11-05 DIAGNOSIS — M54.50 CHRONIC BILATERAL LOW BACK PAIN WITHOUT SCIATICA: Primary | ICD-10-CM

## 2024-11-05 DIAGNOSIS — Z79.899 ENCOUNTER FOR LONG-TERM (CURRENT) USE OF MEDICATIONS: ICD-10-CM

## 2024-11-05 DIAGNOSIS — M48.9 CERVICAL SPINE DISEASE: ICD-10-CM

## 2024-11-05 PROBLEM — G56.00 CARPAL TUNNEL SYNDROME: Status: RESOLVED | Noted: 2021-06-15 | Resolved: 2024-11-05

## 2024-11-05 PROCEDURE — 4010F ACE/ARB THERAPY RXD/TAKEN: CPT | Mod: CPTII,S$GLB,, | Performed by: FAMILY MEDICINE

## 2024-11-05 PROCEDURE — G2211 COMPLEX E/M VISIT ADD ON: HCPCS | Mod: S$GLB,,, | Performed by: FAMILY MEDICINE

## 2024-11-05 PROCEDURE — 3044F HG A1C LEVEL LT 7.0%: CPT | Mod: CPTII,S$GLB,, | Performed by: FAMILY MEDICINE

## 2024-11-05 PROCEDURE — 1159F MED LIST DOCD IN RCRD: CPT | Mod: CPTII,S$GLB,, | Performed by: FAMILY MEDICINE

## 2024-11-05 PROCEDURE — 99999 PR PBB SHADOW E&M-EST. PATIENT-LVL V: CPT | Mod: PBBFAC,,, | Performed by: FAMILY MEDICINE

## 2024-11-05 PROCEDURE — 3079F DIAST BP 80-89 MM HG: CPT | Mod: CPTII,S$GLB,, | Performed by: FAMILY MEDICINE

## 2024-11-05 PROCEDURE — 3075F SYST BP GE 130 - 139MM HG: CPT | Mod: CPTII,S$GLB,, | Performed by: FAMILY MEDICINE

## 2024-11-05 PROCEDURE — 3008F BODY MASS INDEX DOCD: CPT | Mod: CPTII,S$GLB,, | Performed by: FAMILY MEDICINE

## 2024-11-05 PROCEDURE — 99214 OFFICE O/P EST MOD 30 MIN: CPT | Mod: S$GLB,,, | Performed by: FAMILY MEDICINE

## 2024-11-05 PROCEDURE — 1160F RVW MEDS BY RX/DR IN RCRD: CPT | Mod: CPTII,S$GLB,, | Performed by: FAMILY MEDICINE

## 2024-11-05 RX ORDER — HYDROCODONE BITARTRATE AND ACETAMINOPHEN 10; 325 MG/1; MG/1
1 TABLET ORAL 4 TIMES DAILY PRN
Qty: 120 TABLET | Refills: 0 | Status: SHIPPED | OUTPATIENT
Start: 2024-11-05

## 2024-11-05 RX ORDER — LOSARTAN POTASSIUM 100 MG/1
100 TABLET ORAL
COMMUNITY
Start: 2024-10-31

## 2024-11-05 RX ORDER — HYDROCHLOROTHIAZIDE 25 MG/1
25 TABLET ORAL
COMMUNITY
Start: 2024-10-31

## 2024-11-05 RX ORDER — ZOLPIDEM TARTRATE 10 MG/1
10 TABLET ORAL NIGHTLY PRN
Qty: 30 TABLET | Refills: 5 | Status: SHIPPED | OUTPATIENT
Start: 2024-11-05

## 2024-11-05 NOTE — PATIENT INSTRUCTIONS
Gilberto Pressley,     If you are due for any health screening(s) below please notify me so we can arrange them to be ordered and scheduled. Most healthy patients at your age complete them, but you are free to accept or refuse.     If you can't do it, I'll definitely understand. If you can, I'd certainly appreciate it!    All of your core healthy metrics are met.

## 2024-11-05 NOTE — PROGRESS NOTES
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      Head: Normocephalic and atraumatic.      Right Ear: External ear normal.      Left Ear: External ear normal.      Nose: Nose normal. No rhinorrhea.   Eyes:      Extraocular Movements: Extraocular movements intact.      Pupils: Pupils are equal, round, and reactive to light.   Cardiovascular:      Rate and Rhythm: Normal rate.      Pulses: Normal pulses.   Pulmonary:      Effort: Pulmonary effort is normal. No respiratory distress.      Breath sounds: Wheezing present.   Abdominal:      General: Bowel sounds are normal. There is no distension.      Palpations: Abdomen is soft.   Musculoskeletal:         General: Tenderness and deformity present. Normal range of motion.      Cervical back: Normal range of motion and neck supple.   Skin:     General: Skin is warm and dry.      Capillary Refill: Capillary refill takes less than 2 seconds.   Neurological:      General: No focal deficit present.      Mental Status: She is alert and oriented to person, place, and time.   Psychiatric:         Mood and Affect: Mood normal.         Behavior: Behavior normal.       Physical Exam  Scant wheeze noted in lungs.  Heart sounds are normal.    Vital Signs  Blood pressure measures  192/108.    Results      Assessment:     1. Chronic bilateral low back pain without sciatica    2. Encounter for long-term (current) use of medications    3. Cervical spine disease    4. Insomnia, unspecified type    5. Hypertension, essential      MDM:   Moderate medical complexity.  Moderate risk  Total time: 21 minutes.  This includes total time spent on the encounter, which includes face to face time and non-face to face time preparing to see the patient (eg, review of previous medical records, tests), Obtaining and/or reviewing separately obtained history, documenting clinical information in the electronic or other health record, independently interpreting results (not separately reported)/communicating results to the patient/family/caregiver, and/or care coordination (not separately reported).    I have Reviewed and summarized old records.  I have performed thorough medication reconciliation today and discussed risk and benefits of medications.  I have reviewed labs and discussed with patient.  All questions were answered.  I am requesting old records and will review them once they are available.    I have signed for the following orders AND/OR meds.  No orders of the defined types were placed in this encounter.    Medications Ordered This Encounter   Medications    HYDROcodone-acetaminophen (NORCO)  mg per tablet     Sig: Take 1 tablet by mouth 4 (four) times daily as needed for Pain.     Dispense:  120 tablet     Refill:  0     Quantity prescribed more than 7 day supply? Yes, quantity medically necessary    zolpidem (AMBIEN) 10 mg Tab     Sig: Take 1 tablet (10 mg total) by mouth nightly as needed (sleep).     Dispense:  30 tablet     Refill:  5        Follow up in about 3 months (around 2/5/2025), or if symptoms worsen or fail to improve, for Med refills, LAB RESULTS.    If no improvement in symptoms or symptoms worsen, advised to call/follow-up at clinic or go to ER. Patient voiced understanding and  all questions/concerns were addressed.   DISCLAIMER: This note was compiled by using a speech recognition dictation system and therefore please be aware that typographical / speech recognition errors can and do occur.  Please contact me if you see any errors specifically.  Consent was obtained for [facility] recording system prior to the visit.    Buzz Gee M.D.       Office: [phone]   [address] [place] [address]  FAX: [phone]

## 2024-11-05 NOTE — ASSESSMENT & PLAN NOTE
Continue pain medication.  Continue follow-up with neuro surgery and pain management. Continue pain medication as prescribed. RTC in 3 months for re-evaluation.  The patient was checked in the [facility] Board [employer]'s  Prescription Monitoring Program. There appears to be no incongruities with the patient's verbalized history.  If no improvement with pain medications patient will be referred to pain management for further evaluation.

## 2024-11-18 DIAGNOSIS — Z12.31 ENCOUNTER FOR SCREENING MAMMOGRAM FOR MALIGNANT NEOPLASM OF BREAST: Primary | ICD-10-CM

## 2025-02-04 ENCOUNTER — TELEPHONE (OUTPATIENT)
Dept: FAMILY MEDICINE | Facility: CLINIC | Age: 60
End: 2025-02-04
Payer: COMMERCIAL

## 2025-02-04 NOTE — TELEPHONE ENCOUNTER
----- Message from Shavon sent at 2/4/2025 12:21 PM CST -----  Contact: [phone]@[email or web address]  Good afternoon patient says she missed a call from someone in office. Please call patient to advise [phone]

## 2025-02-13 ENCOUNTER — LAB VISIT (OUTPATIENT)
Dept: LAB | Facility: HOSPITAL | Age: 60
End: 2025-02-13
Attending: FAMILY MEDICINE
Payer: COMMERCIAL

## 2025-02-13 ENCOUNTER — OFFICE VISIT (OUTPATIENT)
Dept: FAMILY MEDICINE | Facility: CLINIC | Age: 60
End: 2025-02-13
Payer: COMMERCIAL

## 2025-02-13 VITALS
BODY MASS INDEX: 26.74 KG/M2 | OXYGEN SATURATION: 98 % | SYSTOLIC BLOOD PRESSURE: 124 MMHG | DIASTOLIC BLOOD PRESSURE: 80 MMHG | HEIGHT: 60 IN | WEIGHT: 136.19 LBS | HEART RATE: 87 BPM

## 2025-02-13 DIAGNOSIS — R53.83 FATIGUE, UNSPECIFIED TYPE: Primary | ICD-10-CM

## 2025-02-13 DIAGNOSIS — G47.00 INSOMNIA, UNSPECIFIED TYPE: Chronic | ICD-10-CM

## 2025-02-13 DIAGNOSIS — M54.50 CHRONIC BILATERAL LOW BACK PAIN WITHOUT SCIATICA: ICD-10-CM

## 2025-02-13 DIAGNOSIS — M48.9 CERVICAL SPINE DISEASE: ICD-10-CM

## 2025-02-13 DIAGNOSIS — E78.5 HYPERLIPIDEMIA, UNSPECIFIED HYPERLIPIDEMIA TYPE: ICD-10-CM

## 2025-02-13 DIAGNOSIS — I10 HYPERTENSION, ESSENTIAL: ICD-10-CM

## 2025-02-13 DIAGNOSIS — G89.29 CHRONIC BILATERAL LOW BACK PAIN WITHOUT SCIATICA: ICD-10-CM

## 2025-02-13 DIAGNOSIS — F41.9 ANXIETY: Chronic | ICD-10-CM

## 2025-02-13 DIAGNOSIS — Z79.899 ENCOUNTER FOR LONG-TERM (CURRENT) USE OF MEDICATIONS: ICD-10-CM

## 2025-02-13 DIAGNOSIS — J47.9 BRONCHIECTASIS WITHOUT COMPLICATION: Chronic | ICD-10-CM

## 2025-02-13 DIAGNOSIS — R53.83 FATIGUE, UNSPECIFIED TYPE: ICD-10-CM

## 2025-02-13 LAB
25(OH)D3+25(OH)D2 SERPL-MCNC: 33 NG/ML (ref 30–96)
ALBUMIN SERPL BCP-MCNC: 4.7 G/DL (ref 3.5–5.2)
ALP SERPL-CCNC: 95 U/L (ref 40–150)
ALT SERPL W/O P-5'-P-CCNC: 15 U/L (ref 10–44)
ANION GAP SERPL CALC-SCNC: 12 MMOL/L (ref 8–16)
AST SERPL-CCNC: 32 U/L (ref 10–40)
BILIRUB SERPL-MCNC: 0.5 MG/DL (ref 0.1–1)
BUN SERPL-MCNC: 14 MG/DL (ref 6–20)
CALCIUM SERPL-MCNC: 10.3 MG/DL (ref 8.7–10.5)
CHLORIDE SERPL-SCNC: 103 MMOL/L (ref 95–110)
CO2 SERPL-SCNC: 26 MMOL/L (ref 23–29)
CREAT SERPL-MCNC: 0.8 MG/DL (ref 0.5–1.4)
ERYTHROCYTE [DISTWIDTH] IN BLOOD BY AUTOMATED COUNT: 12 % (ref 11.5–14.5)
EST. GFR  (NO RACE VARIABLE): >60 ML/MIN/1.73 M^2
ESTIMATED AVG GLUCOSE: 103 MG/DL (ref 68–131)
FERRITIN SERPL-MCNC: 65 NG/ML (ref 20–300)
FOLATE SERPL-MCNC: 12.3 NG/ML (ref 4–24)
GLUCOSE SERPL-MCNC: 98 MG/DL (ref 70–110)
HBA1C MFR BLD: 5.2 % (ref 4–5.6)
HCT VFR BLD AUTO: 45.8 % (ref 37–48.5)
HGB BLD-MCNC: 14.8 G/DL (ref 12–16)
IRON SERPL-MCNC: 99 UG/DL (ref 30–160)
MCH RBC QN AUTO: 31.4 PG (ref 27–31)
MCHC RBC AUTO-ENTMCNC: 32.3 G/DL (ref 32–36)
MCV RBC AUTO: 97 FL (ref 82–98)
PLATELET # BLD AUTO: 413 K/UL (ref 150–450)
PMV BLD AUTO: 9.7 FL (ref 9.2–12.9)
POTASSIUM SERPL-SCNC: 3.7 MMOL/L (ref 3.5–5.1)
PROT SERPL-MCNC: 9.1 G/DL (ref 6–8.4)
RBC # BLD AUTO: 4.71 M/UL (ref 4–5.4)
SATURATED IRON: 21 % (ref 20–50)
SODIUM SERPL-SCNC: 141 MMOL/L (ref 136–145)
TOTAL IRON BINDING CAPACITY: 466 UG/DL (ref 250–450)
TRANSFERRIN SERPL-MCNC: 315 MG/DL (ref 200–375)
TSH SERPL DL<=0.005 MIU/L-ACNC: 1.19 UIU/ML (ref 0.4–4)
VIT B12 SERPL-MCNC: 636 PG/ML (ref 210–950)
WBC # BLD AUTO: 6.48 K/UL (ref 3.9–12.7)

## 2025-02-13 PROCEDURE — 99999 PR PBB SHADOW E&M-EST. PATIENT-LVL V: CPT | Mod: PBBFAC,,, | Performed by: FAMILY MEDICINE

## 2025-02-13 PROCEDURE — 83540 ASSAY OF IRON: CPT | Performed by: FAMILY MEDICINE

## 2025-02-13 PROCEDURE — 99214 OFFICE O/P EST MOD 30 MIN: CPT | Mod: S$GLB,,, | Performed by: FAMILY MEDICINE

## 2025-02-13 PROCEDURE — 82607 VITAMIN B-12: CPT | Performed by: FAMILY MEDICINE

## 2025-02-13 PROCEDURE — 85027 COMPLETE CBC AUTOMATED: CPT | Performed by: FAMILY MEDICINE

## 2025-02-13 PROCEDURE — 84443 ASSAY THYROID STIM HORMONE: CPT | Performed by: FAMILY MEDICINE

## 2025-02-13 PROCEDURE — 83036 HEMOGLOBIN GLYCOSYLATED A1C: CPT | Performed by: FAMILY MEDICINE

## 2025-02-13 PROCEDURE — 82306 VITAMIN D 25 HYDROXY: CPT | Performed by: FAMILY MEDICINE

## 2025-02-13 PROCEDURE — 80053 COMPREHEN METABOLIC PANEL: CPT | Performed by: FAMILY MEDICINE

## 2025-02-13 PROCEDURE — G2211 COMPLEX E/M VISIT ADD ON: HCPCS | Mod: S$GLB,,, | Performed by: FAMILY MEDICINE

## 2025-02-13 PROCEDURE — 82746 ASSAY OF FOLIC ACID SERUM: CPT | Performed by: FAMILY MEDICINE

## 2025-02-13 PROCEDURE — 82728 ASSAY OF FERRITIN: CPT | Performed by: FAMILY MEDICINE

## 2025-02-13 RX ORDER — HYDROCODONE BITARTRATE AND ACETAMINOPHEN 10; 325 MG/1; MG/1
1 TABLET ORAL 4 TIMES DAILY PRN
Qty: 120 TABLET | Refills: 0 | Status: SHIPPED | OUTPATIENT
Start: 2025-02-13

## 2025-02-13 RX ORDER — MELOXICAM 15 MG/1
15 TABLET ORAL DAILY
Qty: 90 TABLET | Refills: 4 | Status: SHIPPED | OUTPATIENT
Start: 2025-02-13

## 2025-02-13 RX ORDER — ZOLPIDEM TARTRATE 10 MG/1
10 TABLET ORAL NIGHTLY PRN
Qty: 30 TABLET | Refills: 5 | Status: SHIPPED | OUTPATIENT
Start: 2025-02-13

## 2025-02-13 NOTE — PROGRESS NOTES
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 placed or performed during the hospital encounter of 12/19/23   EKG 12-lead    Collection Time: 12/19/23 10:19 AM    Narrative    Test Reason : R06.02,    Vent. Rate : 107 BPM     Atrial Rate : 107 BPM     P-R Int : 134 ms          QRS Dur : 074 ms      QT Int : 288 ms       P-R-T Axes : 074 062 -13 degrees     QTc Int : 384 ms    Sinus tachycardia  Nonspecific T wave abnormality  Abnormal ECG  No previous ECGs available  Confirmed by Kana Xie MD ([id number]) on 12/20/2023 7:53:45 PM    Referred By:  MO           Confirmed By:Kana Xie MD                Current Assessment & Plan     Counseled on importance of hypertension disease course, I recommend ongoing Education for DASH-diet and exercise.  Counseled on medication regimen importance of treating high blood pressure.  Please be advised of risk of untreated blood pressure as discussed.  Please advised of ER precautions were given for symptoms of hypertensive urgency and emergency.           Encounter for long-term (current) use of medications (Chronic)    Overview     February 2025:  Reviewed labs.  November 2024:  Reviewed labs.  August 2024: reviewed labs.May 2024: reviewed labs.January 2024: Reviewed labs.November 2023: Reviewed labs.  August 2023: Reviewed labs. May 2023:  Reviewed labs.  Patient reports that she recently had labs to her cardiologist office.  She will get a copy of this.  November 2022:  Reviewed labs.  CHRONIC. Stable. Compliant with medications for managed conditions. See medication list. No SE reported.   Routine lab analysis is being monitored. Refills were addressed.  September 2021:  Reviewed outside labs.  Patient gets labs performed at [facility] in [place] where she works at.  March 2022:  Reviewed outside labs.  June 2022:  Patient is due for labs.  She will have these performed at outside lab in [place].  August 2022:  Outside labs reviewed.  Sent off for scanning.  Lab Results   Component Value Date    WBC 7.32 08/05/2024    HGB 12.4 08/05/2024    HCT 38.4 08/05/2024    MCV 97 08/05/2024     08/05/2024         Chemistry        Component Value Date/Time     08/05/2024 0830     04/30/2019 0000    K 4.0 08/05/2024 0830    K 4.0 04/30/2019 0000     08/05/2024 0830     04/30/2019 0000    CO2 24 08/05/2024 0830    CO2 30 04/30/2019 0000    BUN 22 (H) 08/05/2024 0830    BUN 15.0 04/30/2019 0000    CREATININE 0.8 08/05/2024 0830    CREATININE 0.7 04/30/2019 0000    GLU 81 08/05/2024 0830        Component Value Date/Time    CALCIUM 9.6 08/05/2024 0830    CALCIUM 9.5 04/30/2019 0000    ALKPHOS 70 08/05/2024 0830    AST 20 08/05/2024 0830    AST 21 04/30/2019 0000    ALT 12 08/05/2024 0830    ALT 13 04/30/2019 0000    BILITOT 0.5 08/05/2024 0830        Lab Results   Component Value Date    TSH 1.270 08/05/2024    FREET4 0.79 04/26/2023            Current Assessment & Plan     Update labs.  Complete history and physical was completed today.  Complete and thorough medication reconciliation was performed.  Discussed risks and benefits of medications.  Advised patient on orders and health maintenance.  We discussed old records and old labs if available.  Will request any records not available through epic.  Continue current  medications listed on your summary sheet.           Anxiety (Chronic)    Overview     February 2025:  Continues on Xanax.  Reports compliance no side effects reported.    Chronic.  Stable.  Patient uses Xanax 0.25 milligrams as needed. Uses seldomly. Reports compliance.  No side effects reported.  Patient has tried Lexapro and other SSRIs with side effects.  Denies SI HI hallucinations.          Current Assessment & Plan     Continue Xanax. Discussed medication risks. The patient was checked in the [place] [employer]'s  Prescription Monitoring Program. There appears to be no incongruities with the patient's verbalized history.     Please be advised of condition course.  SNRI/SSRI is first-line treatment for this condition.  Please be advised of the risk of discontinuing this medication without tapering/contacting MD.  Patient has been advised of side effects, and all questions were answered.  Patient voiced understanding.  Patient will follow up routinely and notify us if having any side effects or worsening or persistent symptoms.  ER precautions were given. Antidepressant/Antianxiety Medication Initiation:  Patient informed of risks, benefits, and potential side effects of medication and accepts informed consent.  Common side effects include nausea, fatigue, headache, insomnia, etc see medication insert for complete side effect profile.  Most importantly be advised of the possibility of new or worsening suicidal thoughts/depression/anxiety etcetera.  Please be advised to stop the medication immediately and seek urgent treatment if this occurs.  Therefore please do not to abruptly discontinue medication without physician guidance except in cases of sudden onset or worsening of SI.          Hyperlipidemia (Chronic)    Overview     CHRONIC. STABLE. Lab analysis reviewed.       (-) CP, SOB, abdominal pain, N/V/D, constipation, jaundice, skin changes.  (-) Myalgias  Lab Results   Component Value Date     CHOL 193 08/05/2024    CHOL 211 (H) 04/26/2023    CHOL 156 06/21/2021     Lab Results   Component Value Date    HDL 63 08/05/2024    HDL 76 (H) 04/26/2023    HDL 58 06/21/2021     Lab Results   Component Value Date    LDLCALC 118.6 08/05/2024    LDLCALC 120.0 04/26/2023    LDLCALC 85 06/21/2021     Lab Results   Component Value Date    TRIG 57 08/05/2024    TRIG 75 04/26/2023    TRIG 67 06/21/2021     Lab Results   Component Value Date    CHOLHDL 32.6 08/05/2024    CHOLHDL 36.0 04/26/2023     Lab Results   Component Value Date    TOTALCHOLEST 3.1 08/05/2024    TOTALCHOLEST 2.8 04/26/2023     Lab Results   Component Value Date    ALT 12 08/05/2024    AST 20 08/05/2024    ALKPHOS 70 08/05/2024    BILITOT 0.5 08/05/2024     ======================================================  The 10-year ASCVD risk score (Estefani [place], et al., 2019) is: 3.7%    Values used to calculate the score:      Age: 59 years      Sex: Female      Is Non- : No      Diabetic: No      Tobacco smoker: No      Systolic Blood Pressure: 124 mmHg      Is BP treated: Yes      HDL Cholesterol: 63 mg/dL      Total Cholesterol: 193 mg/dL           Current Assessment & Plan     Counseled on hyperlipidemia disease course, healthy diet and increased need for exercise.  Please be advised of the risk of cardiovascular disease, increase stroke and heart attack risk with uncontrolled/untreated hyperlipidemia.     Patient voiced understanding and understood the treatment plan. All questions were answered.            Fatigue - Primary        Review of patient's allergies indicates:   Allergen Reactions    Coconut Itching and Swelling    Coconut oil Photosensitivity    Lexapro [escitalopram]     Serotonin 5ht-3 antagonists Other (See Comments)     Current Outpatient Medications   Medication Instructions    albuterol (PROVENTIL/VENTOLIN HFA) 90 mcg/actuation inhaler 2 puffs, Inhalation, Every 4 hours PRN, Rescue    ALPRAZolam (XANAX) 0.25 mg,  Oral, Daily PRN    ascorbic acid (vitamin C) (VITAMIN C) 500 mg, Daily    aspirin (ECOTRIN) 81 mg, Oral, Daily    estradiol-norethindrone (COMBIPATCH) 0.05-0.14 mg/24 hr UNWRAP AND APPLY 1 PATCH ON THE SKIN TWICE A WEEK    fluticasone-salmeterol diskus inhaler 250-50 mcg 1 puff, 2 times daily    folic acid (FOLVITE) 1 mg, Daily    HYDROcodone-acetaminophen (NORCO)  mg per tablet 1 tablet, Oral, 4 times daily PRN    levalbuterol (XOPENEX) 1.25 mg/3 mL nebulizer solution TAKE 3MLS BY MOUTH PER NEBULIZATION THREE TIMES DAILY    LIDOcaine 4 % PtMd 1 patch, Daily PRN    losartan-hydrochlorothiazide 50-12.5 mg (HYZAAR) 50-12.5 mg per tablet 1 tablet    meloxicam (MOBIC) 15 mg, Oral, Daily    sodium chloride 3% 3 % nebulizer solution 4 mLs, Nebulization, 2 times daily PRN    VIOS AEROSOL DELIVERY SYSTEM Laura 3 times daily    zolpidem (AMBIEN) 10 mg, Oral, Nightly PRN      I have reviewed the PMH, social history, FamilyHx, surgical history, allergies and medications documented / confirmed by the patient at the time of this visit.  Review of Systems see review of system note  Objective:   /80   Pulse 87   Ht 5' (1.524 m)   Wt 61.8 kg (136 lb 3.2 oz)   LMP 01/05/2014   SpO2 98%   BMI 26.60 kg/m²   Physical Exam  Vitals and nursing note reviewed.   Constitutional:       General: She is not in acute distress.     Appearance: She is well-developed.      Comments: presents alone   HENT:      Head: Normocephalic and atraumatic.      Right Ear: External ear normal.      Left Ear: External ear normal.      Nose: Nose normal. No rhinorrhea.   Eyes:      Extraocular Movements: Extraocular movements intact.      Pupils: Pupils are equal, round, and reactive to light.   Cardiovascular:      Rate and Rhythm: Normal rate.      Pulses: Normal pulses.   Pulmonary:      Effort: Pulmonary effort is normal. No respiratory distress.      Breath sounds: Wheezing present.   Abdominal:      General: Bowel sounds are normal. There  is no distension.      Palpations: Abdomen is soft.   Musculoskeletal:         General: Tenderness and deformity present. Normal range of motion.      Cervical back: Normal range of motion and neck supple.   Skin:     General: Skin is warm and dry.      Capillary Refill: Capillary refill takes less than 2 seconds.   Neurological:      General: No focal deficit present.      Mental Status: She is alert and oriented to person, place, and time.   Psychiatric:         Mood and Affect: Mood normal.         Behavior: Behavior normal.       Physical Exam  Wheezing noted at the bases of the lungs, slightly worse on the right side.    Results  Laboratory Studies  Labs done in August showed no anemia. Kidney and liver function were normal. Cholesterol levels were good. A1c was good. Thyroid function was good.    Imaging  CT scan of the chest in November showed stable pneumatoceles and some nodules.    Assessment:     1. Fatigue, unspecified type    2. Insomnia, unspecified type    3. Encounter for long-term (current) use of medications    4. Chronic bilateral low back pain without sciatica    5. Hypertension, essential    6. Bronchiectasis without complication    7. Hyperlipidemia, unspecified hyperlipidemia type    8. Anxiety    9. Cervical spine disease      MDM:   Moderate medical complexity.  Moderate risk.  Total time: 21 minutes.  This includes total time spent on the encounter, which includes face to face time and non-face to face time preparing to see the patient (eg, review of previous medical records, tests), Obtaining and/or reviewing separately obtained history, documenting clinical information in the electronic or other health record, independently interpreting results (not separately reported)/communicating results to the patient/family/caregiver, and/or care coordination (not separately reported).    I have Reviewed and summarized old records.  I have performed thorough medication reconciliation today and  discussed risk and benefits of medications.  I have reviewed labs and discussed with patient.  All questions were answered.  I am requesting old records and will review them once they are available.  Pulmonary, pain management  Visit today included increased complexity associated with the care of the episodic problem see above assessment addressed and managing the longitudinal care of the patient due to the serious and/or complex managed problem(s) see above.    I have signed for the following orders AND/OR meds.  Orders Placed This Encounter   Procedures    CBC Without Differential     Standing Status:   Future     Number of Occurrences:   1     Standing Expiration Date:   4/14/2026    Comprehensive Metabolic Panel     Standing Status:   Future     Number of Occurrences:   1     Standing Expiration Date:   4/14/2026    TSH     Standing Status:   Future     Number of Occurrences:   1     Standing Expiration Date:   4/14/2026    Hemoglobin A1C     Standing Status:   Future     Number of Occurrences:   1     Standing Expiration Date:   4/14/2026    Lipid Panel     Standing Status:   Future     Standing Expiration Date:   4/14/2026    Vitamin D     Standing Status:   Future     Number of Occurrences:   1     Standing Expiration Date:   2/13/2026     Order Specific Question:   Send normal result to authorizing provider's In Basket if patient is active on MyChart:     Answer:   Yes    Iron and TIBC     Standing Status:   Future     Number of Occurrences:   1     Standing Expiration Date:   4/14/2026     Order Specific Question:   Send normal result to authorizing provider's In Basket if patient is active on MyChart:     Answer:   Yes    Ferritin     Standing Status:   Future     Number of Occurrences:   1     Standing Expiration Date:   4/14/2026     Order Specific Question:   Send normal result to authorizing provider's In Basket if patient is active on MyChart:     Answer:   Yes    Vitamin B12     Standing Status:    Future     Number of Occurrences:   1     Standing Expiration Date:   4/14/2026     Order Specific Question:   Send normal result to authorizing provider's In Basket if patient is active on MyChart:     Answer:   Yes    Folate     Standing Status:   Future     Number of Occurrences:   1     Standing Expiration Date:   4/14/2026     Order Specific Question:   Send normal result to authorizing provider's In Basket if patient is active on MyChart:     Answer:   Yes    Ambulatory referral/consult to Physical/Occupational Therapy     Standing Status:   Future     Standing Expiration Date:   3/13/2026     Referral Priority:   Routine     Referral Type:   Physical Medicine     Referral Reason:   Specialty Services Required     Referred to Provider:   [place] -     Requested Specialty:   Physical Therapy     Number of Visits Requested:   1     Medications Ordered This Encounter   Medications    HYDROcodone-acetaminophen (NORCO)  mg per tablet     Sig: Take 1 tablet by mouth 4 (four) times daily as needed for Pain.     Dispense:  120 tablet     Refill:  0     Quantity prescribed more than 7 day supply? Yes, quantity medically necessary    meloxicam (MOBIC) 15 MG tablet     Sig: Take 1 tablet (15 mg total) by mouth once daily.     Dispense:  90 tablet     Refill:  4    zolpidem (AMBIEN) 10 mg Tab     Sig: Take 1 tablet (10 mg total) by mouth nightly as needed (sleep).     Dispense:  30 tablet     Refill:  5        Follow up in about 6 months (around 8/13/2025), or if symptoms worsen or fail to improve.  Future Appointments       Date Specialty Appt Notes    2/13/2025 Lab           If no improvement in symptoms or symptoms worsen, advised to call/follow-up at clinic or go to ER. Patient voiced understanding and all questions/concerns were addressed.   DISCLAIMER: This note was compiled by using a speech recognition dictation system and therefore please be aware that typographical / speech  recognition errors can and do occur.  Please contact me if you see any errors specifically.  Consent was obtained for SORAYA recording system prior to the visit.    This note was generated with the assistance of ambient listening technology. Verbal consent was obtained by the patient and accompanying visitor(s) for the recording of patient appointment to facilitate this note. I attest to having reviewed and edited the generated note for accuracy, though some syntax or spelling errors may persist. Please contact the author of this note for any clarification.    Buzz Gee M.D.       Office: [phone] [address] [place] [address]  FAX: [phone]

## 2025-02-13 NOTE — PROGRESS NOTES
Review of Systems   Constitutional:  Positive for fatigue. Negative for chills, fever and unexpected weight change.   HENT:  Negative for congestion, ear pain and sore throat.    Eyes:  Negative for pain and visual disturbance.   Respiratory:  Positive for shortness of breath and wheezing. Negative for cough and chest tightness.    Cardiovascular:  Positive for chest pain. Negative for palpitations and leg swelling.   Gastrointestinal:  Negative for abdominal pain, constipation, diarrhea, nausea and vomiting.   Endocrine: Negative for cold intolerance, heat intolerance and polyuria.   Genitourinary:  Negative for dysuria, flank pain and hematuria.   Musculoskeletal:  Positive for back pain. Negative for arthralgias, joint swelling and neck pain.   Skin:  Negative for color change, rash and wound.   Allergic/Immunologic: Negative for environmental allergies and immunocompromised state.   Neurological:  Negative for dizziness, seizures, syncope and headaches.   Hematological:  Does not bruise/bleed easily.   Psychiatric/Behavioral:  Positive for sleep disturbance. Negative for confusion and hallucinations. The patient is not nervous/anxious.

## 2025-02-13 NOTE — ASSESSMENT & PLAN NOTE
Continue Xanax. Discussed medication risks. The patient was checked in the [facility] Board of Pharmacy's  Prescription Monitoring Program. There appears to be no incongruities with the patient's verbalized history.     Please be advised of condition course.  SNRI/SSRI is first-line treatment for this condition.  Please be advised of the risk of discontinuing this medication without tapering/contacting MD.  Patient has been advised of side effects, and all questions were answered.  Patient voiced understanding.  Patient will follow up routinely and notify us if having any side effects or worsening or persistent symptoms.  ER precautions were given. Antidepressant/Antianxiety Medication Initiation:  Patient informed of risks, benefits, and potential side effects of medication and accepts informed consent.  Common side effects include nausea, fatigue, headache, insomnia, etc see medication insert for complete side effect profile.  Most importantly be advised of the possibility of new or worsening suicidal thoughts/depression/anxiety etcetera.  Please be advised to stop the medication immediately and seek urgent treatment if this occurs.  Therefore please do not to abruptly discontinue medication without physician guidance except in cases of sudden onset or worsening of SI.

## 2025-02-13 NOTE — ASSESSMENT & PLAN NOTE
Refill medication.  Patient is stable on current regimen.  She will follow-up closely with her specialists.

## 2025-02-13 NOTE — ASSESSMENT & PLAN NOTE
Counseled on hyperlipidemia disease course, healthy diet and increased need for exercise.  Please be advised of the risk of cardiovascular disease, increase stroke and heart attack risk with uncontrolled/untreated hyperlipidemia.     Patient voiced understanding and understood the treatment plan. All questions were answered.

## 2025-02-14 ENCOUNTER — PATIENT MESSAGE (OUTPATIENT)
Dept: FAMILY MEDICINE | Facility: CLINIC | Age: 60
End: 2025-02-14
Payer: COMMERCIAL

## 2025-02-14 DIAGNOSIS — Z79.899 ENCOUNTER FOR LONG-TERM (CURRENT) USE OF MEDICATIONS: Primary | ICD-10-CM

## 2025-02-14 DIAGNOSIS — E78.5 HYPERLIPIDEMIA, UNSPECIFIED HYPERLIPIDEMIA TYPE: Chronic | ICD-10-CM

## 2025-02-14 NOTE — TELEPHONE ENCOUNTER
I have signed for the following orders AND/OR meds.  Please call the patient and ask the patient to schedule the testing AND/OR inform about any medications that were sent.      Orders Placed This Encounter   Procedures    Hemoglobin     Standing Status:   Standing     Number of Occurrences:   99     Standing Expiration Date:   3/12/2044    Comprehensive Metabolic Panel     Standing Status:   Standing     Number of Occurrences:   99     Standing Expiration Date:   3/12/2044    Lipid Panel     Standing Status:   Standing     Number of Occurrences:   99     Standing Expiration Date:   3/12/2044    Hemoglobin A1C     Standing Status:   Standing     Number of Occurrences:   99     Standing Expiration Date:   3/12/2044

## 2025-02-14 NOTE — PROGRESS NOTES
Make follow-up lab appointment per recommendation below.  Check to see if patient has seen the results through my chart.  If not then,  #CALL THE PATIENT# to discuss results/see if they have questions and document verification of contact. Make F/U appt if needed. [phone]    #My interpretation that was sent to them through [employer]:  Wendie, I have reviewed your recent blood work.     Vitamin-D level is low.  Start vitamin-D supplementation over-the-counter 2000 units daily.  With vitamin D3 K2.  Folate level is within normal limits.  B12 level is within normal limits.  Okay to take B complex vitamin if having chronic fatigue.  Your complete blood count is stable.  There is no anemia present however Iron levels are at lower limits of normal.  Start iron supplementation over-the-counter  Your metabolic panel which shows your glucose, kidney function, electrolytes, and liver function is mostly within normal limits.  Total protein is elevated.  Will monitor with repeat labs in four weeks.  Thyroid study is normal.   Your hemoglobin A1c is normal.  This test is gold standard screening test for diabetes.  It is a measures 3 months of your average blood sugar.  =========================  Also please address any outstanding health maintenance that may be due: RSV Vaccine (Age 60+ and Pregnant patients)(1 - Risk 60-74 years 1-dose series) Never done

## 2025-03-27 ENCOUNTER — TELEPHONE (OUTPATIENT)
Dept: FAMILY MEDICINE | Facility: CLINIC | Age: 60
End: 2025-03-27
Payer: COMMERCIAL

## 2025-03-27 NOTE — TELEPHONE ENCOUNTER
----- Message from Kathie sent at 3/27/2025  4:31 PM CDT -----  Contact: Self  Type:  Sooner Appointment RequestCaller is requesting a sooner appointment.  Caller declined first available appointment listed below.  Caller will not accept being placed on the waitlist and is requesting a message be sent to doctor.Name of Caller:  PatientWhen is the first available appointment?  07/2025Symptoms:  3 mo f/u scheduled in May/ med refillsWould the patient rather a call back or a response via [facility]? CallTeal[facility]st Call Back Number:  [phone] Information:  Can we please call pt back to schedule, all the appts in May are marked as daily or 5 day change. Thank You

## 2025-05-12 ENCOUNTER — OFFICE VISIT (OUTPATIENT)
Dept: FAMILY MEDICINE | Facility: CLINIC | Age: 60
End: 2025-05-12
Payer: COMMERCIAL

## 2025-05-12 VITALS
DIASTOLIC BLOOD PRESSURE: 62 MMHG | HEART RATE: 92 BPM | TEMPERATURE: 98 F | SYSTOLIC BLOOD PRESSURE: 110 MMHG | WEIGHT: 137.38 LBS | RESPIRATION RATE: 17 BRPM | BODY MASS INDEX: 21.56 KG/M2 | HEIGHT: 67 IN | OXYGEN SATURATION: 98 %

## 2025-05-12 DIAGNOSIS — Z79.899 ENCOUNTER FOR LONG-TERM (CURRENT) USE OF MEDICATIONS: ICD-10-CM

## 2025-05-12 DIAGNOSIS — G89.29 CHRONIC BILATERAL LOW BACK PAIN WITHOUT SCIATICA: ICD-10-CM

## 2025-05-12 DIAGNOSIS — M48.9 CERVICAL SPINE DISEASE: ICD-10-CM

## 2025-05-12 DIAGNOSIS — Z78.0 MENOPAUSE: ICD-10-CM

## 2025-05-12 DIAGNOSIS — M54.50 CHRONIC BILATERAL LOW BACK PAIN WITHOUT SCIATICA: Chronic | ICD-10-CM

## 2025-05-12 DIAGNOSIS — G89.29 CHRONIC BILATERAL LOW BACK PAIN WITHOUT SCIATICA: Chronic | ICD-10-CM

## 2025-05-12 DIAGNOSIS — M54.50 CHRONIC BILATERAL LOW BACK PAIN WITHOUT SCIATICA: ICD-10-CM

## 2025-05-12 DIAGNOSIS — J47.9 BRONCHIECTASIS WITHOUT COMPLICATION: Chronic | ICD-10-CM

## 2025-05-12 DIAGNOSIS — Z79.899 ENCOUNTER FOR LONG-TERM (CURRENT) USE OF MEDICATIONS: Chronic | ICD-10-CM

## 2025-05-12 DIAGNOSIS — I10 HYPERTENSION, ESSENTIAL: Chronic | ICD-10-CM

## 2025-05-12 DIAGNOSIS — F41.9 ANXIETY: Primary | Chronic | ICD-10-CM

## 2025-05-12 PROCEDURE — G2211 COMPLEX E/M VISIT ADD ON: HCPCS | Mod: S$GLB,,, | Performed by: NURSE PRACTITIONER

## 2025-05-12 PROCEDURE — 99999 PR PBB SHADOW E&M-EST. PATIENT-LVL V: CPT | Mod: PBBFAC,,, | Performed by: NURSE PRACTITIONER

## 2025-05-12 PROCEDURE — 99214 OFFICE O/P EST MOD 30 MIN: CPT | Mod: S$GLB,,, | Performed by: NURSE PRACTITIONER

## 2025-05-12 RX ORDER — ALPRAZOLAM 0.25 MG/1
0.25 TABLET ORAL DAILY PRN
Qty: 30 TABLET | Refills: 5 | Status: SHIPPED | OUTPATIENT
Start: 2025-05-12

## 2025-05-12 RX ORDER — ALPRAZOLAM 0.25 MG/1
0.25 TABLET ORAL DAILY PRN
Qty: 30 TABLET | Refills: 5 | Status: CANCELLED | OUTPATIENT
Start: 2025-05-12

## 2025-05-12 NOTE — ASSESSMENT & PLAN NOTE
Continue pain medication as prescribed per PCP. RTC in 3 months for re-evaluation. Follow up with PCP. Plan was disucssed with Dr. Gee. The patient was checked in the [facility] Board of Pharmacy's  Prescription Monitoring Program. There appears to be no incongruities with the patient's verbalized history.  If no improvement with pain medications patient will be referred to pain management for further evaluation.

## 2025-05-12 NOTE — PROGRESS NOTES
Assessment/Plan:    1. Anxiety  Overview:  Chronic.  Stable.  Patient uses Xanax 0.25 milligrams as needed. Uses seldomly. Reports compliance.  No side effects reported.  Patient has tried Lexapro and other SSRIs with side effects.  Denies SI HI hallucinations.     Assessment & Plan:  Continue Xanax. Discussed medication risks. The patient was checked in the [facility] Board of Pharmacy's  Prescription Monitoring Program. There appears to be no incongruities with the patient's verbalized history.     Please be advised of condition course.  SNRI/SSRI is first-line treatment for this condition.  Please be advised of the risk of discontinuing this medication without tapering/contacting MD.  Patient has been advised of side effects, and all questions were answered.  Patient voiced understanding.  Patient will follow up routinely and notify us if having any side effects or worsening or persistent symptoms.  ER precautions were given. Antidepressant/Antianxiety Medication Initiation:  Patient informed of risks, benefits, and potential side effects of medication and accepts informed consent.  Common side effects include nausea, fatigue, headache, insomnia, etc see medication insert for complete side effect profile.  Most importantly be advised of the possibility of new or worsening suicidal thoughts/depression/anxiety etcetera.  Please be advised to stop the medication immediately and seek urgent treatment if this occurs.  Therefore please do not to abruptly discontinue medication without physician guidance except in cases of sudden onset or worsening of SI.     Orders:  -     ALPRAZolam (XANAX) 0.25 MG tablet; Take 1 tablet (0.25 mg total) by mouth daily as needed for Anxiety.  Dispense: 30 tablet; Refill: 5    2. Chronic bilateral low back pain without sciatica  Overview:  May 2025: recently saw a chiropractor which made symptoms worse. Remains on hydrocodone and meloxicam as prescribed. Plans to see pain  management, Dr. Burris in July.    February 2025:  Chronic.  Intermittent control.  Follows with specialists.  She has had steroid injections.  No recent physical therapy.    November 2024:  Chronic.  Stable.    Chronic. August 2024: No change in HPI. She is taking hydrocodone as prescribed. Here for medication refills. Tolerating hydrocodone with no SE reported. Pain stable.     August 2023: here for medication refill. Pain is controlled on medication regimen. No SE reported.    May 2023:  Patient here for medication refill.  Reports compliance.  No side effects reported.  Symptoms are improved.  November 2022: Patient reports compliance with medications.  No side effects reported.  Symptoms are controlled.  Here for medication refill.    Intermittent pain control with Norco.  Patient is under the management of Dr. ALMANZA with neuro surgery.  Pending pain management evaluation for pain interventional techniques.  Patient works as an x-ray tech.  June 2022:  Patient reports no new falls or injury.  August 2022:  No significant change in HPI.          Assessment & Plan:  Continue pain medication as prescribed per PCP. RTC in 3 months for re-evaluation. Follow up with PCP. Plan was disucssed with Dr. Gee. The patient was checked in the [facility] Board of Pharmacy's  Prescription Monitoring Program. There appears to be no incongruities with the patient's verbalized history.  If no improvement with pain medications patient will be referred to pain management for further evaluation.      3. Bronchiectasis without complication  Overview:  May 2025: compliant w/ inhalers. Has ARROYO and wheezing. Has follow up with pulmonology next week.    February 2025:  Patient has had CT scan of her chest and follows with Pulmonary.  Using oxygen as needed.  Chronic. Control uncertain. Chronic respiratory failure after pneumonia Dec 2023. Using albuterol inahler and xopenex nebulizer. Followed by pulmonology. She has had  recent CXR. Continues to to have SOB w/ exertion. She is now out on long term disability.     CT Chest Without Contrast  Narrative: EXAMINATION:  CT CHEST WITHOUT CONTRAST    CLINICAL HISTORY:  Abnormal xray - lung nodule, < 1 cm, mod-high risk;pulmonary nodules;  Bronchiectasis, uncomplicated    TECHNIQUE:  Multiple cross-section obtained from the thoracic inlet to the upper abdomen without the use of contrast.  Coronal and sagittal reformatted images were obtained.  An automated dose exposure technique was utilized.  This limits radiation does the patient.    COMPARISON:  07/05/2024    FINDINGS:  Heart size enlarged with trace pericardial effusion.  The course and caliber of the thoracic aorta is normal.  A triple vessel arch is identified.  The main pulmonary artery is normal caliber.  Shotty lymph nodes are identified for reactive lymph nodes.    Areas of parenchymal scarring are identified with more focal nodule lesion within the right upper lobe this measures 1.0 cm.  Image number 25 of series 9.  The overall appearance is not significantly changed.  Parenchymal blebs and bulla are not significantly changed either.  Focal areas of traction bronchiectasis with bronchial wall thickening is identified.  No pleural thickening or effusion.  The trachea and airway are patent.    The visualized hollow and solid viscera grossly normal.    No suspicious osseous lesions.  Scattered spondylotic changes.  Soft tissues are grossly normal.  Impression: No significant oval change compared to the prior examination.    Electronically signed by: Jesse Melchor MD  Date:    11/11/2024  Time:    12:15      Assessment & Plan:  Continue inhalers.  Follow-up with Pulmonary soon as scheduled.       4. Hypertension, essential  Overview:  Chronic. BP stable on current regimen.    Hypertension Medications              losartan-hydrochlorothiazide 50-12.5 mg (HYZAAR) 50-12.5 mg per tablet Take 1 tablet by mouth.     (-) CP, SOB,  palpitations, dizziness, lightheadedness, HA, arm numbness, tingling or weakness, syncope.  Creatinine   Date Value Ref Range Status   02/13/2025 0.8 0.5 - 1.4 mg/dL Final   04/30/2019 0.7 mg/dL Final     Results for orders placed or performed during the hospital encounter of 12/19/23   EKG 12-lead    Collection Time: 12/19/23 10:19 AM    Narrative    Test Reason : R06.02,    Vent. Rate : 107 BPM     Atrial Rate : 107 BPM     P-R Int : 134 ms          QRS Dur : 074 ms      QT Int : 288 ms       P-R-T Axes : 074 062 -13 degrees     QTc Int : 384 ms    Sinus tachycardia  Nonspecific T wave abnormality  Abnormal ECG  No previous ECGs available  Confirmed by Kana Xie MD ([id number]) on 12/20/2023 7:53:45 PM    Referred By:  MO           Confirmed By:Kana Xie MD       Assessment & Plan:  Counseled on importance of hypertension disease course, I recommend ongoing Education for DASH-diet and exercise. Counseled on medication regimen importance of treating high blood pressure. Please be advised of risk of untreated blood pressure as discussed. Please advised of ER precautions were given for symptoms of hypertensive urgency and emergency.       5. Menopause  Overview:  Chronic. Uses combivent patch per GYN.     Assessment & Plan:  Continue current medication per GYN. Follow up with specialist.       6. Encounter for long-term (current) use of medications  Overview:  CHRONIC. Stable. Compliant with medications for managed conditions. See medication list. No SE reported. Routine lab analysis is being monitored. Refills were addressed.  Reviewed labs.    Lab Results   Component Value Date    WBC 6.48 02/13/2025    HGB 14.8 02/13/2025    HCT 45.8 02/13/2025    MCV 97 02/13/2025     02/13/2025         Chemistry        Component Value Date/Time     02/13/2025 1049     04/30/2019 0000    K 3.7 02/13/2025 1049    K 4.0 04/30/2019 0000     02/13/2025 1049     04/30/2019 0000    CO2 26  02/13/2025 1049    CO2 30 04/30/2019 0000    BUN 14 02/13/2025 1049    BUN 15.0 04/30/2019 0000    CREATININE 0.8 02/13/2025 1049    CREATININE 0.7 04/30/2019 0000    GLU 98 02/13/2025 1049        Component Value Date/Time    CALCIUM 10.3 02/13/2025 1049    CALCIUM 9.5 04/30/2019 0000    ALKPHOS 95 02/13/2025 1049    AST 32 02/13/2025 1049    AST 21 04/30/2019 0000    ALT 15 02/13/2025 1049    ALT 13 04/30/2019 0000    BILITOT 0.5 02/13/2025 1049        Lab Results   Component Value Date    TSH 1.190 02/13/2025    FREET4 0.79 04/26/2023       Assessment & Plan:  Complete history and physical was completed today.  Complete and thorough medication reconciliation was performed.  Discussed risks and benefits of medications.  Advised patient on orders and health maintenance.  We discussed old records and old labs if available.  Will request any records not available through epic.  Continue current medications listed on your summary sheet.        Follow up in about 3 months (around 8/12/2025), or if symptoms worsen or fail to improve, for follow up with PCP.  ER precautions for severe or worsening symptoms.     Kylie Damico NP  _____________________________________________________________________________________________________________________________________________________    CC: medication refill    HPI: Patient is a 59-year-old female who presents in clinic today as an established patient here for medication refills.     BACK PAIN AND MUSCULOSKELETAL:  She reports worsening back pain following physical therapy and chiropractic sessions. She discontinued chiropractic treatment after three visits due to significant inflammation and difficulty walking. She experiences pain radiating down the leg to the knee, particularly after brief periods of sitting. She has difficulty bending and lifting objects over 5 lbs. She is compliant with hydrocodone as prescribed. She plans to see pain management, Dr. Burris in July.  Doing well on pain medication as prescribed per PCP. No SE reported.     RESPIRATORY:  She reports nocturnal cough that worsens when lying down. She experiences shortness of breath with exertion that resolves with rest. She is compliant with inhalers. Plans to follow up with pulmonology next week.    She has no other acute complaints. Denies any other questions, problems, or concerns.    CURRENT MEDICATIONS:  She takes Xanax as needed (not daily), Combivent patch, Mobic, Ambien, and losartan. She takes pain medication at reduced dose by breaking tablets in half, increasing frequency to 2-3 times daily during symptomatic periods.      Past Medical History:  Past Medical History:   Diagnosis Date    Bronchiolectasis 01/2021    Bulging disc     Degenerative disc disease, cervical     Degenerative disc disease, lumbar     Fibrocystic breast      Past Surgical History:   Procedure Laterality Date    MANDIBLE FRACTURE SURGERY       Review of patient's allergies indicates:   Allergen Reactions    Coconut Itching and Swelling    Coconut oil Photosensitivity    Lexapro [escitalopram]     Serotonin 5ht-3 antagonists Other (See Comments)     Social History[1]  Family History   Problem Relation Name Age of Onset    Hypertension Mother Mom     Diabetes Mother Mom     Cancer Father Erick     Breast cancer Paternal Aunt      Colon cancer Neg Hx      Ovarian cancer Neg Hx       Medications Ordered Prior to Encounter[2]    Review of Systems   Constitutional:  Negative for appetite change, chills, fatigue, fever and unexpected weight change.   HENT:  Negative for congestion, ear pain, rhinorrhea and sore throat.    Eyes:  Negative for pain and visual disturbance.   Respiratory:  Positive for wheezing. Negative for cough, chest tightness and shortness of breath.    Cardiovascular:  Negative for chest pain, palpitations and leg swelling.   Gastrointestinal:  Negative for abdominal pain, constipation, diarrhea, nausea and vomiting.  "  Endocrine: Negative for cold intolerance, heat intolerance and polyuria.   Genitourinary:  Negative for difficulty urinating, dysuria, flank pain and hematuria.   Musculoskeletal:  Positive for back pain. Negative for arthralgias, joint swelling, myalgias and neck pain.   Skin:  Negative for color change, rash and wound.   Allergic/Immunologic: Negative for environmental allergies and immunocompromised state.   Neurological:  Negative for dizziness, seizures, syncope and headaches.   Hematological:  Does not bruise/bleed easily.   Psychiatric/Behavioral:  Positive for sleep disturbance. Negative for behavioral problems, confusion and hallucinations. The patient is nervous/anxious.      Vitals:    05/12/25 1004   BP: 110/62   Pulse: 92   Resp: 17   Temp: 98 °F (36.7 °C)   TempSrc: Oral   SpO2: 98%   Weight: 62.3 kg (137 lb 6.4 oz)   Height: 5' 7" (1.702 m)     Wt Readings from Last 3 Encounters:   05/12/25 62.3 kg (137 lb 6.4 oz)   02/13/25 61.8 kg (136 lb 3.2 oz)   11/05/24 62.6 kg (137 lb 14.4 oz)     Physical Exam  Vitals and nursing note reviewed.   Constitutional:       General: She is not in acute distress.     Appearance: She is well-developed. She is not ill-appearing, toxic-appearing or diaphoretic.   HENT:      Head: Normocephalic and atraumatic.      Right Ear: Hearing and external ear normal.      Left Ear: Hearing and external ear normal.      Nose: Nose normal. No rhinorrhea.   Eyes:      General: Lids are normal.      Extraocular Movements: Extraocular movements intact.      Conjunctiva/sclera: Conjunctivae normal.      Pupils: Pupils are equal, round, and reactive to light.   Cardiovascular:      Rate and Rhythm: Normal rate.      Pulses: Normal pulses.   Pulmonary:      Effort: No respiratory distress.      Breath sounds: Wheezing present.      Comments: Decreased breath sounds right lobe; she does get SOB w/ exertion   Abdominal:      General: Bowel sounds are normal. There is no distension.      " Palpations: Abdomen is soft.   Musculoskeletal:         General: Normal range of motion.      Cervical back: Normal range of motion and neck supple.   Skin:     General: Skin is warm and dry.      Capillary Refill: Capillary refill takes less than 2 seconds.      Coloration: Skin is not pale.   Neurological:      General: No focal deficit present.      Mental Status: She is alert and oriented to person, place, and time. Mental status is at baseline. She is not disoriented.      Motor: No weakness.      Gait: Gait normal.   Psychiatric:         Attention and Perception: She is attentive.         Mood and Affect: Mood normal. Mood is not anxious, depressed or elated. Affect is tearful (discussing her mother's recent passing). Affect is not labile, blunt, flat or angry.         Speech: Speech normal. She is communicative. Speech is not rapid and pressured, delayed or slurred.         Behavior: Behavior normal. Behavior is not agitated, aggressive or hyperactive. Behavior is cooperative.         Thought Content: Thought content normal. Thought content is not paranoid or delusional. Thought content does not include homicidal or suicidal ideation. Thought content does not include homicidal or suicidal plan.         Cognition and Memory: Memory is not impaired.         Judgment: Judgment normal.       Health Maintenance   Topic Date Due    RSV Vaccine (Age 60+ and Pregnant patients) (1 - Risk 60-74 years 1-dose series) Never done    Shingles Vaccine (1 of 2) 11/05/2025 (Originally 9/11/2014)    COVID-19 Vaccine (3 - 2024-25 season) 11/05/2025 (Originally 9/1/2024)    HIV Screening  06/15/2027 (Originally 9/11/1980)    Mammogram  09/03/2025    Cervical Cancer Screening  11/17/2028    Lipid Panel  08/05/2029    Colorectal Cancer Screening  11/23/2030    TETANUS VACCINE  10/31/2033    Hepatitis C Screening  Completed    Influenza Vaccine  Completed    Pneumococcal Vaccines (Age 50+)  Completed     This note was generated with  the assistance of ambient listening technology. Verbal consent was obtained by the patient and accompanying visitor(s) for the recording of patient appointment to facilitate this note. I attest to having reviewed and edited the generated note for accuracy, though some syntax or spelling errors may persist. Please contact the author of this note for any clarification.    Visit today included increased complexity associated with the care of the episodic problem - see above- addressed and managing the longitudinal care of the patient due to the serious and/or complex managed problem(s) - see above.             [1]   Social History  Tobacco Use    Smoking status: Never     Passive exposure: Never    Smokeless tobacco: Never   Substance Use Topics    Alcohol use: No    Drug use: No   [2]   Current Outpatient Medications on File Prior to Visit   Medication Sig Dispense Refill    albuterol (PROVENTIL/VENTOLIN HFA) 90 mcg/actuation inhaler Inhale 2 puffs into the lungs every 4 (four) hours as needed for Wheezing. Rescue 18 g 5    ascorbic acid, vitamin C, (VITAMIN C) 500 MG tablet Take 500 mg by mouth once daily.      estradiol-norethindrone (COMBIPATCH) 0.05-0.14 mg/24 hr UNWRAP AND APPLY 1 PATCH ON THE SKIN TWICE A WEEK 8 patch 6    fluticasone-salmeterol diskus inhaler 250-50 mcg INHALE 1 PUFF INTO THE LUNGS TWICE DAILY 60 each 6    folic acid (FOLVITE) 1 MG tablet Take 1 mg by mouth once daily.      HYDROcodone-acetaminophen (NORCO)  mg per tablet Take 1 tablet by mouth 4 (four) times daily as needed for Pain. 120 tablet 0    levalbuterol (XOPENEX) 1.25 mg/3 mL nebulizer solution TAKE 3MLS BY MOUTH PER NEBULIZATION THREE TIMES DAILY 270 mL 5    LIDOcaine 4 % PtMd Apply 1 patch topically daily as needed (back/neck pain).      losartan-hydrochlorothiazide 50-12.5 mg (HYZAAR) 50-12.5 mg per tablet Take 1 tablet by mouth.      meloxicam (MOBIC) 15 MG tablet Take 1 tablet (15 mg total) by mouth once daily. 90 tablet 4     sodium chloride 3% 3 % nebulizer solution Take 4 mLs by nebulization 2 (two) times daily as needed for Other or Cough (cough). 120 mL 3    VIOS AEROSOL DELIVERY SYSTEM Laura by Misc.(Non-Drug; Combo Route) route 3 (three) times daily. Use with Nebulizer solution as directed      zolpidem (AMBIEN) 10 mg Tab Take 1 tablet (10 mg total) by mouth nightly as needed (sleep). 30 tablet 5    [DISCONTINUED] ALPRAZolam (XANAX) 0.25 MG tablet Take 1 tablet (0.25 mg total) by mouth daily as needed for Anxiety. 30 tablet 5    aspirin (ECOTRIN) 81 MG EC tablet Take 1 tablet (81 mg total) by mouth once daily. 30 tablet 0     No current facility-administered medications on file prior to visit.

## 2025-05-12 NOTE — ASSESSMENT & PLAN NOTE
Continue Xanax. Discussed medication risks. The patient was checked in the [facility] Board of Pharmacy's  Prescription Monitoring Program. There appears to be no incongruities with the patient's verbalized history.     Please be advised of condition course.  SNRI/SSRI is first-line treatment for this condition.  Please be advised of the risk of discontinuing this medication without tapering/contacting MD.  Patient has been advised of side effects, and all questions were answered.  Patient voiced understanding.  Patient will follow up routinely and notify us if having any side effects or worsening or persistent symptoms.  ER precautions were given. Antidepressant/Antianxiety Medication Initiation:  Patient informed of risks, benefits, and potential side effects of medication and accepts informed consent.  Common side effects include nausea, fatigue, headache, insomnia, etc see medication insert for complete side effect profile.  Most importantly be advised of the possibility of new or worsening suicidal thoughts/depression/anxiety etcetera.  Please be advised to stop the medication immediately and seek urgent treatment if this occurs.  Therefore please do not to abruptly discontinue medication without physician guidance except in cases of sudden onset or worsening of SI.

## 2025-05-12 NOTE — ASSESSMENT & PLAN NOTE
Counseled on importance of hypertension disease course, I recommend ongoing Education for DASH-diet and exercise. Counseled on medication regimen importance of treating high blood pressure. Please be advised of risk of untreated blood pressure as discussed. Please advised of ER precautions were given for symptoms of hypertensive urgency and emergency.

## 2025-05-13 RX ORDER — HYDROCODONE BITARTRATE AND ACETAMINOPHEN 10; 325 MG/1; MG/1
1 TABLET ORAL 4 TIMES DAILY PRN
Qty: 120 TABLET | Refills: 0 | Status: SHIPPED | OUTPATIENT
Start: 2025-05-13

## 2025-08-13 ENCOUNTER — OFFICE VISIT (OUTPATIENT)
Dept: FAMILY MEDICINE | Facility: CLINIC | Age: 60
End: 2025-08-13
Payer: COMMERCIAL

## 2025-08-13 DIAGNOSIS — J47.9 BRONCHIECTASIS WITHOUT COMPLICATION: Chronic | ICD-10-CM

## 2025-08-13 DIAGNOSIS — M54.50 CHRONIC BILATERAL LOW BACK PAIN WITHOUT SCIATICA: ICD-10-CM

## 2025-08-13 DIAGNOSIS — Z79.899 ENCOUNTER FOR LONG-TERM (CURRENT) USE OF MEDICATIONS: ICD-10-CM

## 2025-08-13 DIAGNOSIS — M48.9 CERVICAL SPINE DISEASE: ICD-10-CM

## 2025-08-13 DIAGNOSIS — I10 HYPERTENSION, ESSENTIAL: Chronic | ICD-10-CM

## 2025-08-13 DIAGNOSIS — G89.29 CHRONIC BILATERAL LOW BACK PAIN WITHOUT SCIATICA: ICD-10-CM

## 2025-08-13 DIAGNOSIS — G47.00 INSOMNIA, UNSPECIFIED TYPE: Primary | Chronic | ICD-10-CM

## 2025-08-13 PROBLEM — R51.9 HEADACHE: Status: RESOLVED | Noted: 2021-06-15 | Resolved: 2025-08-13

## 2025-08-13 PROBLEM — R53.81 PHYSICAL DECONDITIONING: Status: RESOLVED | Noted: 2024-07-09 | Resolved: 2025-08-13

## 2025-08-13 PROBLEM — M53.3 PAIN IN THE COCCYX: Status: RESOLVED | Noted: 2021-03-10 | Resolved: 2025-08-13

## 2025-08-13 PROBLEM — U07.1 COVID-19 VIRUS INFECTION: Status: RESOLVED | Noted: 2023-12-19 | Resolved: 2025-08-13

## 2025-08-13 PROBLEM — D75.839 THROMBOCYTOSIS: Status: RESOLVED | Noted: 2024-02-02 | Resolved: 2025-08-13

## 2025-08-13 PROBLEM — R10.30 LOWER ABDOMINAL PAIN: Status: RESOLVED | Noted: 2021-06-15 | Resolved: 2025-08-13

## 2025-08-13 PROBLEM — E87.6 HYPOKALEMIA: Status: RESOLVED | Noted: 2023-12-19 | Resolved: 2025-08-13

## 2025-08-13 PROCEDURE — G2211 COMPLEX E/M VISIT ADD ON: HCPCS | Mod: S$GLB,,, | Performed by: NURSE PRACTITIONER

## 2025-08-13 PROCEDURE — 99999 PR PBB SHADOW E&M-EST. PATIENT-LVL V: CPT | Mod: PBBFAC,,, | Performed by: NURSE PRACTITIONER

## 2025-08-13 PROCEDURE — 99214 OFFICE O/P EST MOD 30 MIN: CPT | Mod: S$GLB,,, | Performed by: NURSE PRACTITIONER

## 2025-08-13 RX ORDER — PANTOPRAZOLE SODIUM 40 MG/1
40 TABLET, DELAYED RELEASE ORAL DAILY
COMMUNITY

## 2025-08-13 RX ORDER — ZOLPIDEM TARTRATE 10 MG/1
10 TABLET ORAL NIGHTLY PRN
Qty: 30 TABLET | Refills: 5 | Status: SHIPPED | OUTPATIENT
Start: 2025-08-13

## 2025-08-13 RX ORDER — HYDROCODONE BITARTRATE AND ACETAMINOPHEN 10; 325 MG/1; MG/1
1 TABLET ORAL 4 TIMES DAILY PRN
Qty: 120 TABLET | Refills: 0 | Status: SHIPPED | OUTPATIENT
Start: 2025-08-13

## 2025-08-13 RX ORDER — ZOLPIDEM TARTRATE 10 MG/1
10 TABLET ORAL NIGHTLY PRN
Qty: 30 TABLET | Refills: 5 | Status: CANCELLED | OUTPATIENT
Start: 2025-08-13